# Patient Record
Sex: FEMALE | Race: WHITE | NOT HISPANIC OR LATINO | Employment: OTHER
[De-identification: names, ages, dates, MRNs, and addresses within clinical notes are randomized per-mention and may not be internally consistent; named-entity substitution may affect disease eponyms.]

---

## 2017-02-01 ENCOUNTER — RECORDS - HEALTHEAST (OUTPATIENT)
Dept: ADMINISTRATIVE | Facility: OTHER | Age: 82
End: 2017-02-01

## 2017-03-09 ENCOUNTER — COMMUNICATION - HEALTHEAST (OUTPATIENT)
Dept: INTERNAL MEDICINE | Facility: CLINIC | Age: 82
End: 2017-03-09

## 2017-04-14 ENCOUNTER — COMMUNICATION - HEALTHEAST (OUTPATIENT)
Dept: INTERNAL MEDICINE | Facility: CLINIC | Age: 82
End: 2017-04-14

## 2017-06-10 ENCOUNTER — COMMUNICATION - HEALTHEAST (OUTPATIENT)
Dept: INTERNAL MEDICINE | Facility: CLINIC | Age: 82
End: 2017-06-10

## 2017-06-16 ENCOUNTER — COMMUNICATION - HEALTHEAST (OUTPATIENT)
Dept: INTERNAL MEDICINE | Facility: CLINIC | Age: 82
End: 2017-06-16

## 2017-06-20 ENCOUNTER — COMMUNICATION - HEALTHEAST (OUTPATIENT)
Dept: INTERNAL MEDICINE | Facility: CLINIC | Age: 82
End: 2017-06-20

## 2017-07-21 ENCOUNTER — RECORDS - HEALTHEAST (OUTPATIENT)
Dept: ADMINISTRATIVE | Facility: OTHER | Age: 82
End: 2017-07-21

## 2017-08-21 ENCOUNTER — OFFICE VISIT - HEALTHEAST (OUTPATIENT)
Dept: INTERNAL MEDICINE | Facility: CLINIC | Age: 82
End: 2017-08-21

## 2017-08-21 DIAGNOSIS — R00.2 PALPITATIONS: ICD-10-CM

## 2017-08-21 DIAGNOSIS — I10 ESSENTIAL HYPERTENSION WITH GOAL BLOOD PRESSURE LESS THAN 140/90: ICD-10-CM

## 2017-08-21 DIAGNOSIS — K57.90 DIVERTICULOSIS: ICD-10-CM

## 2017-08-21 DIAGNOSIS — Z00.00 ROUTINE GENERAL MEDICAL EXAMINATION AT A HEALTH CARE FACILITY: ICD-10-CM

## 2017-08-21 DIAGNOSIS — R07.81 PLEURODYNIA: ICD-10-CM

## 2017-08-21 DIAGNOSIS — D47.2 MONOCLONAL PARAPROTEINEMIA: ICD-10-CM

## 2017-08-21 DIAGNOSIS — I47.10 SVT (SUPRAVENTRICULAR TACHYCARDIA) (H): ICD-10-CM

## 2017-08-21 DIAGNOSIS — D36.9 ADENOMATOUS POLYP: ICD-10-CM

## 2017-08-21 DIAGNOSIS — E78.00 HYPERCHOLESTEREMIA: ICD-10-CM

## 2017-08-21 LAB
CHOLEST SERPL-MCNC: 173 MG/DL
FASTING STATUS PATIENT QL REPORTED: YES
HDLC SERPL-MCNC: 58 MG/DL
LDLC SERPL CALC-MCNC: 98 MG/DL
TRIGL SERPL-MCNC: 83 MG/DL

## 2017-08-21 ASSESSMENT — MIFFLIN-ST. JEOR: SCORE: 1092.25

## 2017-08-22 ENCOUNTER — COMMUNICATION - HEALTHEAST (OUTPATIENT)
Dept: INTERNAL MEDICINE | Facility: CLINIC | Age: 82
End: 2017-08-22

## 2017-10-17 ENCOUNTER — AMBULATORY - HEALTHEAST (OUTPATIENT)
Dept: INTERNAL MEDICINE | Facility: CLINIC | Age: 82
End: 2017-10-17

## 2017-10-17 ENCOUNTER — AMBULATORY - HEALTHEAST (OUTPATIENT)
Dept: NURSING | Facility: CLINIC | Age: 82
End: 2017-10-17

## 2017-10-20 ENCOUNTER — COMMUNICATION - HEALTHEAST (OUTPATIENT)
Dept: INTERNAL MEDICINE | Facility: CLINIC | Age: 82
End: 2017-10-20

## 2017-10-20 DIAGNOSIS — I10 ESSENTIAL HYPERTENSION WITH GOAL BLOOD PRESSURE LESS THAN 140/90: ICD-10-CM

## 2018-03-24 ENCOUNTER — COMMUNICATION - HEALTHEAST (OUTPATIENT)
Dept: INTERNAL MEDICINE | Facility: CLINIC | Age: 83
End: 2018-03-24

## 2018-03-24 DIAGNOSIS — I10 HYPERTENSION: ICD-10-CM

## 2018-08-24 ENCOUNTER — AMBULATORY - HEALTHEAST (OUTPATIENT)
Dept: INTERNAL MEDICINE | Facility: CLINIC | Age: 83
End: 2018-08-24

## 2018-08-27 ENCOUNTER — OFFICE VISIT - HEALTHEAST (OUTPATIENT)
Dept: INTERNAL MEDICINE | Facility: CLINIC | Age: 83
End: 2018-08-27

## 2018-08-27 DIAGNOSIS — E78.00 HYPERCHOLESTEREMIA: ICD-10-CM

## 2018-08-27 DIAGNOSIS — D36.9 ADENOMATOUS POLYP: ICD-10-CM

## 2018-08-27 DIAGNOSIS — I47.10 SVT (SUPRAVENTRICULAR TACHYCARDIA) (H): ICD-10-CM

## 2018-08-27 DIAGNOSIS — Z00.00 ROUTINE GENERAL MEDICAL EXAMINATION AT A HEALTH CARE FACILITY: ICD-10-CM

## 2018-08-27 DIAGNOSIS — I10 ESSENTIAL HYPERTENSION: ICD-10-CM

## 2018-08-27 LAB
ALBUMIN SERPL-MCNC: 4.2 G/DL (ref 3.5–5)
ALP SERPL-CCNC: 69 U/L (ref 45–120)
ALT SERPL W P-5'-P-CCNC: 15 U/L (ref 0–45)
ANION GAP SERPL CALCULATED.3IONS-SCNC: 10 MMOL/L (ref 5–18)
AST SERPL W P-5'-P-CCNC: 26 U/L (ref 0–40)
BILIRUB SERPL-MCNC: 0.7 MG/DL (ref 0–1)
BUN SERPL-MCNC: 30 MG/DL (ref 8–28)
CALCIUM SERPL-MCNC: 10.4 MG/DL (ref 8.5–10.5)
CHLORIDE BLD-SCNC: 105 MMOL/L (ref 98–107)
CHOLEST SERPL-MCNC: 185 MG/DL
CO2 SERPL-SCNC: 26 MMOL/L (ref 22–31)
CREAT SERPL-MCNC: 1.26 MG/DL (ref 0.6–1.1)
ERYTHROCYTE [DISTWIDTH] IN BLOOD BY AUTOMATED COUNT: 12.6 % (ref 11–14.5)
FASTING STATUS PATIENT QL REPORTED: YES
GFR SERPL CREATININE-BSD FRML MDRD: 41 ML/MIN/1.73M2
GLUCOSE BLD-MCNC: 98 MG/DL (ref 70–125)
HCT VFR BLD AUTO: 43.8 % (ref 35–47)
HDLC SERPL-MCNC: 61 MG/DL
HGB BLD-MCNC: 14.8 G/DL (ref 12–16)
LDLC SERPL CALC-MCNC: 106 MG/DL
MCH RBC QN AUTO: 32.6 PG (ref 27–34)
MCHC RBC AUTO-ENTMCNC: 33.8 G/DL (ref 32–36)
MCV RBC AUTO: 96 FL (ref 80–100)
PLATELET # BLD AUTO: 197 THOU/UL (ref 140–440)
PMV BLD AUTO: 8.3 FL (ref 7–10)
POTASSIUM BLD-SCNC: 4.3 MMOL/L (ref 3.5–5)
PROT SERPL-MCNC: 8.3 G/DL (ref 6–8)
RBC # BLD AUTO: 4.54 MILL/UL (ref 3.8–5.4)
SODIUM SERPL-SCNC: 141 MMOL/L (ref 136–145)
TRIGL SERPL-MCNC: 92 MG/DL
WBC: 5.9 THOU/UL (ref 4–11)

## 2018-08-27 ASSESSMENT — MIFFLIN-ST. JEOR: SCORE: 1055.96

## 2018-08-28 ENCOUNTER — COMMUNICATION - HEALTHEAST (OUTPATIENT)
Dept: INTERNAL MEDICINE | Facility: CLINIC | Age: 83
End: 2018-08-28

## 2018-09-19 ENCOUNTER — RECORDS - HEALTHEAST (OUTPATIENT)
Dept: ADMINISTRATIVE | Facility: OTHER | Age: 83
End: 2018-09-19

## 2018-09-21 ENCOUNTER — COMMUNICATION - HEALTHEAST (OUTPATIENT)
Dept: INTERNAL MEDICINE | Facility: CLINIC | Age: 83
End: 2018-09-21

## 2018-09-21 DIAGNOSIS — I10 HYPERTENSION: ICD-10-CM

## 2018-09-28 ENCOUNTER — COMMUNICATION - HEALTHEAST (OUTPATIENT)
Dept: INTERNAL MEDICINE | Facility: CLINIC | Age: 83
End: 2018-09-28

## 2018-09-28 DIAGNOSIS — E78.5 HYPERLIPIDEMIA: ICD-10-CM

## 2018-10-02 ENCOUNTER — AMBULATORY - HEALTHEAST (OUTPATIENT)
Dept: NURSING | Facility: CLINIC | Age: 83
End: 2018-10-02

## 2018-10-02 DIAGNOSIS — Z23 FLU VACCINE NEED: ICD-10-CM

## 2018-10-16 ENCOUNTER — COMMUNICATION - HEALTHEAST (OUTPATIENT)
Dept: INTERNAL MEDICINE | Facility: CLINIC | Age: 83
End: 2018-10-16

## 2018-10-16 DIAGNOSIS — I10 ESSENTIAL HYPERTENSION WITH GOAL BLOOD PRESSURE LESS THAN 140/90: ICD-10-CM

## 2018-11-29 ENCOUNTER — AMBULATORY - HEALTHEAST (OUTPATIENT)
Dept: INTERNAL MEDICINE | Facility: CLINIC | Age: 83
End: 2018-11-29

## 2018-11-29 DIAGNOSIS — I10 ESSENTIAL HYPERTENSION: ICD-10-CM

## 2018-11-30 ENCOUNTER — AMBULATORY - HEALTHEAST (OUTPATIENT)
Dept: LAB | Facility: CLINIC | Age: 83
End: 2018-11-30

## 2018-11-30 DIAGNOSIS — I10 ESSENTIAL HYPERTENSION: ICD-10-CM

## 2018-11-30 LAB
ANION GAP SERPL CALCULATED.3IONS-SCNC: 12 MMOL/L (ref 5–18)
BUN SERPL-MCNC: 27 MG/DL (ref 8–28)
CALCIUM SERPL-MCNC: 10.2 MG/DL (ref 8.5–10.5)
CHLORIDE BLD-SCNC: 104 MMOL/L (ref 98–107)
CO2 SERPL-SCNC: 25 MMOL/L (ref 22–31)
CREAT SERPL-MCNC: 1.18 MG/DL (ref 0.6–1.1)
GFR SERPL CREATININE-BSD FRML MDRD: 44 ML/MIN/1.73M2
GLUCOSE BLD-MCNC: 113 MG/DL (ref 70–125)
POTASSIUM BLD-SCNC: 4 MMOL/L (ref 3.5–5)
SODIUM SERPL-SCNC: 141 MMOL/L (ref 136–145)

## 2018-12-03 ENCOUNTER — COMMUNICATION - HEALTHEAST (OUTPATIENT)
Dept: INTERNAL MEDICINE | Facility: CLINIC | Age: 83
End: 2018-12-03

## 2019-03-12 ENCOUNTER — OFFICE VISIT - HEALTHEAST (OUTPATIENT)
Dept: INTERNAL MEDICINE | Facility: CLINIC | Age: 84
End: 2019-03-12

## 2019-03-12 DIAGNOSIS — M54.31 SCIATICA OF RIGHT SIDE: ICD-10-CM

## 2019-03-12 ASSESSMENT — MIFFLIN-ST. JEOR: SCORE: 1055.96

## 2019-04-11 ENCOUNTER — HOSPITAL ENCOUNTER (OUTPATIENT)
Dept: LAB | Age: 84
Setting detail: SPECIMEN
Discharge: HOME | End: 2019-04-11

## 2019-04-11 ENCOUNTER — COMMUNICATION - HEALTHEAST (OUTPATIENT)
Dept: INTERNAL MEDICINE | Facility: CLINIC | Age: 84
End: 2019-04-11

## 2019-04-11 ENCOUNTER — OFFICE VISIT - HEALTHEAST (OUTPATIENT)
Dept: INTERNAL MEDICINE | Facility: CLINIC | Age: 84
End: 2019-04-11

## 2019-04-11 DIAGNOSIS — I10 ESSENTIAL HYPERTENSION: ICD-10-CM

## 2019-04-11 DIAGNOSIS — Z01.818 PREOP GENERAL PHYSICAL EXAM: ICD-10-CM

## 2019-04-11 DIAGNOSIS — H25.013 CORTICAL AGE-RELATED CATARACT OF BOTH EYES: ICD-10-CM

## 2019-04-11 DIAGNOSIS — E78.00 HYPERCHOLESTEREMIA: ICD-10-CM

## 2019-04-11 LAB
ANION GAP SERPL CALCULATED.3IONS-SCNC: 10 MMOL/L (ref 5–18)
BUN SERPL-MCNC: 28 MG/DL (ref 8–28)
CALCIUM SERPL-MCNC: 10.1 MG/DL (ref 8.5–10.5)
CHLORIDE BLD-SCNC: 102 MMOL/L (ref 98–107)
CO2 SERPL-SCNC: 27 MMOL/L (ref 22–31)
CREAT SERPL-MCNC: 1.26 MG/DL (ref 0.6–1.1)
ERYTHROCYTE [DISTWIDTH] IN BLOOD BY AUTOMATED COUNT: 13.4 % (ref 11–14.5)
GFR SERPL CREATININE-BSD FRML MDRD: 41 ML/MIN/1.73M2
GLUCOSE BLD-MCNC: 101 MG/DL (ref 70–125)
HCT VFR BLD AUTO: 40.7 % (ref 35–47)
HGB BLD-MCNC: 14.3 G/DL (ref 12–16)
MCH RBC QN AUTO: 32.4 PG (ref 27–34)
MCHC RBC AUTO-ENTMCNC: 35.1 G/DL (ref 32–36)
MCV RBC AUTO: 92 FL (ref 80–100)
PLATELET # BLD AUTO: 246 THOU/UL (ref 140–440)
PMV BLD AUTO: 8.8 FL (ref 7–10)
POTASSIUM BLD-SCNC: 4.2 MMOL/L (ref 3.5–5)
RBC # BLD AUTO: 4.4 MILL/UL (ref 3.8–5.4)
SODIUM SERPL-SCNC: 139 MMOL/L (ref 136–145)
WBC: 5.5 THOU/UL (ref 4–11)

## 2019-04-11 ASSESSMENT — MIFFLIN-ST. JEOR: SCORE: 1037.82

## 2019-05-13 ENCOUNTER — RECORDS - HEALTHEAST (OUTPATIENT)
Dept: ADMINISTRATIVE | Facility: OTHER | Age: 84
End: 2019-05-13

## 2019-06-06 ENCOUNTER — OFFICE VISIT - HEALTHEAST (OUTPATIENT)
Dept: INTERNAL MEDICINE | Facility: CLINIC | Age: 84
End: 2019-06-06

## 2019-06-06 ENCOUNTER — COMMUNICATION - HEALTHEAST (OUTPATIENT)
Dept: INTERNAL MEDICINE | Facility: CLINIC | Age: 84
End: 2019-06-06

## 2019-06-06 DIAGNOSIS — R05.9 COUGH: ICD-10-CM

## 2019-08-29 ENCOUNTER — OFFICE VISIT - HEALTHEAST (OUTPATIENT)
Dept: INTERNAL MEDICINE | Facility: CLINIC | Age: 84
End: 2019-08-29

## 2019-08-29 ENCOUNTER — COMMUNICATION - HEALTHEAST (OUTPATIENT)
Dept: INTERNAL MEDICINE | Facility: CLINIC | Age: 84
End: 2019-08-29

## 2019-08-29 DIAGNOSIS — I10 ESSENTIAL HYPERTENSION: ICD-10-CM

## 2019-08-29 DIAGNOSIS — Z00.00 ROUTINE GENERAL MEDICAL EXAMINATION AT A HEALTH CARE FACILITY: ICD-10-CM

## 2019-08-29 DIAGNOSIS — D36.9 ADENOMATOUS POLYP: ICD-10-CM

## 2019-08-29 DIAGNOSIS — E78.00 HYPERCHOLESTEREMIA: ICD-10-CM

## 2019-08-29 LAB
ALBUMIN SERPL-MCNC: 3.9 G/DL (ref 3.5–5)
ALP SERPL-CCNC: 68 U/L (ref 45–120)
ALT SERPL W P-5'-P-CCNC: 18 U/L (ref 0–45)
ANION GAP SERPL CALCULATED.3IONS-SCNC: 9 MMOL/L (ref 5–18)
AST SERPL W P-5'-P-CCNC: 25 U/L (ref 0–40)
BILIRUB SERPL-MCNC: 0.7 MG/DL (ref 0–1)
BUN SERPL-MCNC: 29 MG/DL (ref 8–28)
CALCIUM SERPL-MCNC: 9.9 MG/DL (ref 8.5–10.5)
CHLORIDE BLD-SCNC: 104 MMOL/L (ref 98–107)
CHOLEST SERPL-MCNC: 163 MG/DL
CO2 SERPL-SCNC: 28 MMOL/L (ref 22–31)
CREAT SERPL-MCNC: 1.23 MG/DL (ref 0.6–1.1)
ERYTHROCYTE [DISTWIDTH] IN BLOOD BY AUTOMATED COUNT: 12.9 % (ref 11–14.5)
FASTING STATUS PATIENT QL REPORTED: YES
GFR SERPL CREATININE-BSD FRML MDRD: 42 ML/MIN/1.73M2
GLUCOSE BLD-MCNC: 101 MG/DL (ref 70–125)
HCT VFR BLD AUTO: 42.3 % (ref 35–47)
HDLC SERPL-MCNC: 62 MG/DL
HGB BLD-MCNC: 14.4 G/DL (ref 12–16)
LDLC SERPL CALC-MCNC: 83 MG/DL
MCH RBC QN AUTO: 32.6 PG (ref 27–34)
MCHC RBC AUTO-ENTMCNC: 34.1 G/DL (ref 32–36)
MCV RBC AUTO: 96 FL (ref 80–100)
PLATELET # BLD AUTO: 164 THOU/UL (ref 140–440)
PMV BLD AUTO: 8.2 FL (ref 7–10)
POTASSIUM BLD-SCNC: 4.4 MMOL/L (ref 3.5–5)
PROT SERPL-MCNC: 7.6 G/DL (ref 6–8)
RBC # BLD AUTO: 4.43 MILL/UL (ref 3.8–5.4)
SODIUM SERPL-SCNC: 141 MMOL/L (ref 136–145)
TRIGL SERPL-MCNC: 89 MG/DL
WBC: 4.1 THOU/UL (ref 4–11)

## 2019-08-29 RX ORDER — MOMETASONE FUROATE 1 MG/G
OINTMENT TOPICAL
Status: SHIPPED | COMMUNITY
Start: 2019-07-16 | End: 2023-01-01

## 2019-08-29 ASSESSMENT — MIFFLIN-ST. JEOR: SCORE: 1046.89

## 2019-09-06 ENCOUNTER — RECORDS - HEALTHEAST (OUTPATIENT)
Dept: ADMINISTRATIVE | Facility: OTHER | Age: 84
End: 2019-09-06

## 2019-09-28 ENCOUNTER — COMMUNICATION - HEALTHEAST (OUTPATIENT)
Dept: INTERNAL MEDICINE | Facility: CLINIC | Age: 84
End: 2019-09-28

## 2019-09-28 DIAGNOSIS — E78.5 HYPERLIPIDEMIA: ICD-10-CM

## 2019-10-15 ENCOUNTER — COMMUNICATION - HEALTHEAST (OUTPATIENT)
Dept: INTERNAL MEDICINE | Facility: CLINIC | Age: 84
End: 2019-10-15

## 2019-10-23 ENCOUNTER — OFFICE VISIT - HEALTHEAST (OUTPATIENT)
Dept: INTERNAL MEDICINE | Facility: CLINIC | Age: 84
End: 2019-10-23

## 2019-10-23 DIAGNOSIS — R05.9 COUGH: ICD-10-CM

## 2019-10-23 DIAGNOSIS — E78.00 HYPERCHOLESTEREMIA: ICD-10-CM

## 2019-10-23 DIAGNOSIS — D47.2 MONOCLONAL PARAPROTEINEMIA: ICD-10-CM

## 2019-10-23 DIAGNOSIS — I10 ESSENTIAL HYPERTENSION: ICD-10-CM

## 2019-10-23 DIAGNOSIS — Z23 NEED FOR IMMUNIZATION AGAINST INFLUENZA: ICD-10-CM

## 2019-10-23 DIAGNOSIS — N28.9 RENAL INSUFFICIENCY: ICD-10-CM

## 2019-10-23 DIAGNOSIS — R53.82 CHRONIC FATIGUE: ICD-10-CM

## 2019-10-23 LAB
ANION GAP SERPL CALCULATED.3IONS-SCNC: 11 MMOL/L (ref 5–18)
BUN SERPL-MCNC: 21 MG/DL (ref 8–28)
CALCIUM SERPL-MCNC: 9.8 MG/DL (ref 8.5–10.5)
CHLORIDE BLD-SCNC: 105 MMOL/L (ref 98–107)
CO2 SERPL-SCNC: 28 MMOL/L (ref 22–31)
CREAT SERPL-MCNC: 0.93 MG/DL (ref 0.6–1.1)
GFR SERPL CREATININE-BSD FRML MDRD: 58 ML/MIN/1.73M2
GLUCOSE BLD-MCNC: 85 MG/DL (ref 70–125)
POTASSIUM BLD-SCNC: 3.9 MMOL/L (ref 3.5–5)
SODIUM SERPL-SCNC: 144 MMOL/L (ref 136–145)
TSH SERPL DL<=0.005 MIU/L-ACNC: 1.15 UIU/ML (ref 0.3–5)

## 2019-10-23 ASSESSMENT — MIFFLIN-ST. JEOR: SCORE: 1060.5

## 2019-10-24 ENCOUNTER — COMMUNICATION - HEALTHEAST (OUTPATIENT)
Dept: INTERNAL MEDICINE | Facility: CLINIC | Age: 84
End: 2019-10-24

## 2019-10-24 DIAGNOSIS — I10 ESSENTIAL HYPERTENSION WITH GOAL BLOOD PRESSURE LESS THAN 140/90: ICD-10-CM

## 2019-10-25 ENCOUNTER — COMMUNICATION - HEALTHEAST (OUTPATIENT)
Dept: INTERNAL MEDICINE | Facility: CLINIC | Age: 84
End: 2019-10-25

## 2019-10-25 LAB
ALBUMIN PERCENT: 59.7 % (ref 51–67)
ALBUMIN SERPL ELPH-MCNC: 4.5 G/DL (ref 3.2–4.7)
ALPHA 1 PERCENT: 2.4 % (ref 2–4)
ALPHA 2 PERCENT: 9.4 % (ref 5–13)
ALPHA1 GLOB SERPL ELPH-MCNC: 0.2 G/DL (ref 0.1–0.3)
ALPHA2 GLOB SERPL ELPH-MCNC: 0.7 G/DL (ref 0.4–0.9)
B-GLOBULIN SERPL ELPH-MCNC: 0.8 G/DL (ref 0.7–1.2)
BETA PERCENT: 11.2 % (ref 10–17)
GAMMA GLOB SERPL ELPH-MCNC: 1.3 G/DL (ref 0.6–1.4)
GAMMA GLOBULIN PERCENT: 17.3 % (ref 9–20)
PATH ICD:: NORMAL
PROT PATTERN SERPL ELPH-IMP: NORMAL
PROT SERPL-MCNC: 7.5 G/DL (ref 6–8)
REVIEWING PATHOLOGIST: NORMAL

## 2019-11-04 ENCOUNTER — COMMUNICATION - HEALTHEAST (OUTPATIENT)
Dept: INTERNAL MEDICINE | Facility: CLINIC | Age: 84
End: 2019-11-04

## 2019-11-04 DIAGNOSIS — I10 ESSENTIAL HYPERTENSION: ICD-10-CM

## 2019-11-22 ENCOUNTER — OFFICE VISIT - HEALTHEAST (OUTPATIENT)
Dept: INTERNAL MEDICINE | Facility: CLINIC | Age: 84
End: 2019-11-22

## 2019-11-22 DIAGNOSIS — I10 ESSENTIAL HYPERTENSION: ICD-10-CM

## 2019-11-22 DIAGNOSIS — N28.9 RENAL INSUFFICIENCY: ICD-10-CM

## 2019-11-22 DIAGNOSIS — E78.00 HYPERCHOLESTEREMIA: ICD-10-CM

## 2019-11-22 DIAGNOSIS — I47.10 SVT (SUPRAVENTRICULAR TACHYCARDIA) (H): ICD-10-CM

## 2020-01-22 ENCOUNTER — COMMUNICATION - HEALTHEAST (OUTPATIENT)
Dept: SCHEDULING | Facility: CLINIC | Age: 85
End: 2020-01-22

## 2020-01-22 DIAGNOSIS — I10 ESSENTIAL HYPERTENSION: ICD-10-CM

## 2020-01-29 ENCOUNTER — RECORDS - HEALTHEAST (OUTPATIENT)
Dept: GENERAL RADIOLOGY | Facility: CLINIC | Age: 85
End: 2020-01-29

## 2020-01-29 ENCOUNTER — OFFICE VISIT - HEALTHEAST (OUTPATIENT)
Dept: INTERNAL MEDICINE | Facility: CLINIC | Age: 85
End: 2020-01-29

## 2020-01-29 DIAGNOSIS — I10 ESSENTIAL HYPERTENSION: ICD-10-CM

## 2020-01-29 DIAGNOSIS — E78.00 HYPERCHOLESTEREMIA: ICD-10-CM

## 2020-01-29 DIAGNOSIS — M48.062 SPINAL STENOSIS, LUMBAR REGION WITH NEUROGENIC CLAUDICATION: ICD-10-CM

## 2020-01-29 DIAGNOSIS — N28.9 RENAL INSUFFICIENCY: ICD-10-CM

## 2020-01-29 DIAGNOSIS — M48.062 SPINAL STENOSIS OF LUMBAR REGION WITH NEUROGENIC CLAUDICATION: ICD-10-CM

## 2020-01-29 ASSESSMENT — MIFFLIN-ST. JEOR: SCORE: 1046.89

## 2020-01-30 ENCOUNTER — COMMUNICATION - HEALTHEAST (OUTPATIENT)
Dept: INTERNAL MEDICINE | Facility: CLINIC | Age: 85
End: 2020-01-30

## 2020-01-31 ENCOUNTER — HOSPITAL ENCOUNTER (OUTPATIENT)
Dept: PHYSICAL MEDICINE AND REHAB | Facility: CLINIC | Age: 85
Discharge: HOME | End: 2020-01-31
Attending: INTERNAL MEDICINE

## 2020-01-31 DIAGNOSIS — M41.9 SCOLIOSIS OF LUMBAR SPINE, UNSPECIFIED SCOLIOSIS TYPE: ICD-10-CM

## 2020-01-31 DIAGNOSIS — M54.16 LUMBAR RADICULAR PAIN: ICD-10-CM

## 2020-01-31 DIAGNOSIS — M47.816 LUMBAR FACET ARTHROPATHY: ICD-10-CM

## 2020-01-31 ASSESSMENT — MIFFLIN-ST. JEOR: SCORE: 1044.51

## 2020-02-10 ENCOUNTER — OFFICE VISIT - HEALTHEAST (OUTPATIENT)
Dept: PHYSICAL THERAPY | Facility: REHABILITATION | Age: 85
End: 2020-02-10

## 2020-02-10 DIAGNOSIS — M54.16 LUMBAR RADICULAR PAIN: ICD-10-CM

## 2020-02-10 DIAGNOSIS — M62.81 GENERALIZED MUSCLE WEAKNESS: ICD-10-CM

## 2020-02-10 DIAGNOSIS — M47.816 LUMBAR FACET ARTHROPATHY: ICD-10-CM

## 2020-02-11 ENCOUNTER — COMMUNICATION - HEALTHEAST (OUTPATIENT)
Dept: PHYSICAL MEDICINE AND REHAB | Facility: CLINIC | Age: 85
End: 2020-02-11

## 2020-02-11 ENCOUNTER — HOSPITAL ENCOUNTER (OUTPATIENT)
Dept: MRI IMAGING | Facility: CLINIC | Age: 85
Discharge: HOME | End: 2020-02-11
Attending: PHYSICIAN ASSISTANT

## 2020-02-11 DIAGNOSIS — M54.16 LUMBAR RADICULAR PAIN: ICD-10-CM

## 2020-02-11 DIAGNOSIS — M47.816 LUMBAR FACET ARTHROPATHY: ICD-10-CM

## 2020-02-11 DIAGNOSIS — M48.062 SPINAL STENOSIS OF LUMBAR REGION WITH NEUROGENIC CLAUDICATION: ICD-10-CM

## 2020-02-11 DIAGNOSIS — M41.9 SCOLIOSIS OF LUMBAR SPINE, UNSPECIFIED SCOLIOSIS TYPE: ICD-10-CM

## 2020-02-24 ENCOUNTER — OFFICE VISIT - HEALTHEAST (OUTPATIENT)
Dept: PHYSICAL THERAPY | Facility: REHABILITATION | Age: 85
End: 2020-02-24

## 2020-02-24 ENCOUNTER — RECORDS - HEALTHEAST (OUTPATIENT)
Dept: ADMINISTRATIVE | Facility: OTHER | Age: 85
End: 2020-02-24

## 2020-02-24 DIAGNOSIS — M54.16 LUMBAR RADICULAR PAIN: ICD-10-CM

## 2020-02-24 DIAGNOSIS — M62.81 GENERALIZED MUSCLE WEAKNESS: ICD-10-CM

## 2020-02-24 DIAGNOSIS — M47.816 LUMBAR FACET ARTHROPATHY: ICD-10-CM

## 2020-02-25 ENCOUNTER — HOSPITAL ENCOUNTER (OUTPATIENT)
Dept: RADIOLOGY | Facility: CLINIC | Age: 85
Discharge: HOME | End: 2020-02-25
Attending: PHYSICIAN ASSISTANT

## 2020-02-25 DIAGNOSIS — M47.816 LUMBAR FACET ARTHROPATHY: ICD-10-CM

## 2020-02-26 ENCOUNTER — COMMUNICATION - HEALTHEAST (OUTPATIENT)
Dept: PHYSICAL MEDICINE AND REHAB | Facility: CLINIC | Age: 85
End: 2020-02-26

## 2020-03-02 ENCOUNTER — OFFICE VISIT - HEALTHEAST (OUTPATIENT)
Dept: PHYSICAL THERAPY | Facility: REHABILITATION | Age: 85
End: 2020-03-02

## 2020-03-02 DIAGNOSIS — M54.16 LUMBAR RADICULAR PAIN: ICD-10-CM

## 2020-03-02 DIAGNOSIS — M47.816 LUMBAR FACET ARTHROPATHY: ICD-10-CM

## 2020-03-02 DIAGNOSIS — M62.81 GENERALIZED MUSCLE WEAKNESS: ICD-10-CM

## 2020-03-05 ENCOUNTER — OFFICE VISIT - HEALTHEAST (OUTPATIENT)
Dept: NEUROSURGERY | Facility: CLINIC | Age: 85
End: 2020-03-05

## 2020-03-05 DIAGNOSIS — M54.16 LUMBAR RADICULOPATHY: ICD-10-CM

## 2020-03-05 ASSESSMENT — MIFFLIN-ST. JEOR: SCORE: 1044.05

## 2020-03-06 ENCOUNTER — OFFICE VISIT - HEALTHEAST (OUTPATIENT)
Dept: PHYSICAL THERAPY | Facility: REHABILITATION | Age: 85
End: 2020-03-06

## 2020-03-06 DIAGNOSIS — M62.81 GENERALIZED MUSCLE WEAKNESS: ICD-10-CM

## 2020-03-06 DIAGNOSIS — M54.16 LUMBAR RADICULAR PAIN: ICD-10-CM

## 2020-03-06 DIAGNOSIS — M47.816 LUMBAR FACET ARTHROPATHY: ICD-10-CM

## 2020-03-12 ENCOUNTER — OFFICE VISIT - HEALTHEAST (OUTPATIENT)
Dept: PHYSICAL THERAPY | Facility: REHABILITATION | Age: 85
End: 2020-03-12

## 2020-03-12 DIAGNOSIS — M47.816 LUMBAR FACET ARTHROPATHY: ICD-10-CM

## 2020-03-12 DIAGNOSIS — M62.81 GENERALIZED MUSCLE WEAKNESS: ICD-10-CM

## 2020-03-12 DIAGNOSIS — M54.16 LUMBAR RADICULAR PAIN: ICD-10-CM

## 2020-03-16 ENCOUNTER — OFFICE VISIT - HEALTHEAST (OUTPATIENT)
Dept: PHYSICAL THERAPY | Facility: REHABILITATION | Age: 85
End: 2020-03-16

## 2020-03-16 DIAGNOSIS — M62.81 GENERALIZED MUSCLE WEAKNESS: ICD-10-CM

## 2020-03-16 DIAGNOSIS — M54.16 LUMBAR RADICULAR PAIN: ICD-10-CM

## 2020-03-16 DIAGNOSIS — M47.816 LUMBAR FACET ARTHROPATHY: ICD-10-CM

## 2020-03-17 ENCOUNTER — HOSPITAL ENCOUNTER (OUTPATIENT)
Dept: MRI IMAGING | Facility: CLINIC | Age: 85
Discharge: HOME | End: 2020-03-17
Attending: SURGERY

## 2020-03-17 DIAGNOSIS — M54.16 LUMBAR RADICULOPATHY: ICD-10-CM

## 2020-03-18 ENCOUNTER — COMMUNICATION - HEALTHEAST (OUTPATIENT)
Dept: PHYSICAL THERAPY | Facility: REHABILITATION | Age: 85
End: 2020-03-18

## 2020-03-19 ENCOUNTER — OFFICE VISIT - HEALTHEAST (OUTPATIENT)
Dept: NEUROSURGERY | Facility: CLINIC | Age: 85
End: 2020-03-19

## 2020-03-19 DIAGNOSIS — M48.02 CERVICAL STENOSIS OF SPINAL CANAL: ICD-10-CM

## 2020-03-19 DIAGNOSIS — M48.062 SPINAL STENOSIS OF LUMBAR REGION WITH NEUROGENIC CLAUDICATION: ICD-10-CM

## 2020-04-29 ENCOUNTER — OFFICE VISIT - HEALTHEAST (OUTPATIENT)
Dept: INTERNAL MEDICINE | Facility: CLINIC | Age: 85
End: 2020-04-29

## 2020-04-29 DIAGNOSIS — E78.00 HYPERCHOLESTEREMIA: ICD-10-CM

## 2020-04-29 DIAGNOSIS — I10 ESSENTIAL HYPERTENSION: ICD-10-CM

## 2020-04-29 DIAGNOSIS — M48.062 SPINAL STENOSIS OF LUMBAR REGION WITH NEUROGENIC CLAUDICATION: ICD-10-CM

## 2020-04-29 DIAGNOSIS — M48.02 CERVICAL STENOSIS OF SPINAL CANAL: ICD-10-CM

## 2020-04-29 DIAGNOSIS — I47.10 SVT (SUPRAVENTRICULAR TACHYCARDIA) (H): ICD-10-CM

## 2020-06-03 ENCOUNTER — OFFICE VISIT - HEALTHEAST (OUTPATIENT)
Dept: PHYSICAL THERAPY | Facility: REHABILITATION | Age: 85
End: 2020-06-03

## 2020-06-03 DIAGNOSIS — M47.816 LUMBAR FACET ARTHROPATHY: ICD-10-CM

## 2020-06-03 DIAGNOSIS — M62.81 GENERALIZED MUSCLE WEAKNESS: ICD-10-CM

## 2020-06-03 DIAGNOSIS — M54.16 LUMBAR RADICULAR PAIN: ICD-10-CM

## 2020-07-28 ENCOUNTER — RECORDS - HEALTHEAST (OUTPATIENT)
Dept: ADMINISTRATIVE | Facility: OTHER | Age: 85
End: 2020-07-28

## 2020-08-14 ENCOUNTER — COMMUNICATION - HEALTHEAST (OUTPATIENT)
Dept: INTERNAL MEDICINE | Facility: CLINIC | Age: 85
End: 2020-08-14

## 2020-08-14 DIAGNOSIS — I10 ESSENTIAL HYPERTENSION: ICD-10-CM

## 2020-09-01 ENCOUNTER — OFFICE VISIT - HEALTHEAST (OUTPATIENT)
Dept: INTERNAL MEDICINE | Facility: CLINIC | Age: 85
End: 2020-09-01

## 2020-09-01 DIAGNOSIS — M48.062 SPINAL STENOSIS OF LUMBAR REGION WITH NEUROGENIC CLAUDICATION: ICD-10-CM

## 2020-09-01 DIAGNOSIS — E78.5 HYPERLIPIDEMIA: ICD-10-CM

## 2020-09-01 DIAGNOSIS — I10 ESSENTIAL HYPERTENSION WITH GOAL BLOOD PRESSURE LESS THAN 140/90: ICD-10-CM

## 2020-09-01 DIAGNOSIS — I47.10 SVT (SUPRAVENTRICULAR TACHYCARDIA) (H): ICD-10-CM

## 2020-09-01 DIAGNOSIS — N28.9 RENAL INSUFFICIENCY: ICD-10-CM

## 2020-09-01 DIAGNOSIS — Z00.00 ROUTINE GENERAL MEDICAL EXAMINATION AT A HEALTH CARE FACILITY: ICD-10-CM

## 2020-09-01 DIAGNOSIS — I10 ESSENTIAL HYPERTENSION: ICD-10-CM

## 2020-09-01 DIAGNOSIS — E78.00 HYPERCHOLESTEREMIA: ICD-10-CM

## 2020-09-01 DIAGNOSIS — M48.02 CERVICAL STENOSIS OF SPINAL CANAL: ICD-10-CM

## 2020-09-01 LAB
ALBUMIN SERPL-MCNC: 4.1 G/DL (ref 3.5–5)
ALP SERPL-CCNC: 70 U/L (ref 45–120)
ALT SERPL W P-5'-P-CCNC: 14 U/L (ref 0–45)
ANION GAP SERPL CALCULATED.3IONS-SCNC: 10 MMOL/L (ref 5–18)
AST SERPL W P-5'-P-CCNC: 23 U/L (ref 0–40)
BILIRUB SERPL-MCNC: 0.7 MG/DL (ref 0–1)
BUN SERPL-MCNC: 22 MG/DL (ref 8–28)
CALCIUM SERPL-MCNC: 9.6 MG/DL (ref 8.5–10.5)
CHLORIDE BLD-SCNC: 105 MMOL/L (ref 98–107)
CHOLEST SERPL-MCNC: 163 MG/DL
CO2 SERPL-SCNC: 26 MMOL/L (ref 22–31)
CREAT SERPL-MCNC: 1.1 MG/DL (ref 0.6–1.1)
ERYTHROCYTE [DISTWIDTH] IN BLOOD BY AUTOMATED COUNT: 12.7 % (ref 11–14.5)
FASTING STATUS PATIENT QL REPORTED: YES
GFR SERPL CREATININE-BSD FRML MDRD: 47 ML/MIN/1.73M2
GLUCOSE BLD-MCNC: 96 MG/DL (ref 70–125)
HCT VFR BLD AUTO: 43.9 % (ref 35–47)
HDLC SERPL-MCNC: 60 MG/DL
HGB BLD-MCNC: 15 G/DL (ref 12–16)
LDLC SERPL CALC-MCNC: 84 MG/DL
MCH RBC QN AUTO: 33.4 PG (ref 27–34)
MCHC RBC AUTO-ENTMCNC: 34.2 G/DL (ref 32–36)
MCV RBC AUTO: 98 FL (ref 80–100)
PLATELET # BLD AUTO: 189 THOU/UL (ref 140–440)
PMV BLD AUTO: 8.6 FL (ref 7–10)
POTASSIUM BLD-SCNC: 4.1 MMOL/L (ref 3.5–5)
PROT SERPL-MCNC: 7.7 G/DL (ref 6–8)
RBC # BLD AUTO: 4.49 MILL/UL (ref 3.8–5.4)
SODIUM SERPL-SCNC: 141 MMOL/L (ref 136–145)
TRIGL SERPL-MCNC: 93 MG/DL
WBC: 4.7 THOU/UL (ref 4–11)

## 2020-09-01 RX ORDER — ATORVASTATIN CALCIUM 10 MG/1
10 TABLET, FILM COATED ORAL AT BEDTIME
Qty: 90 TABLET | Refills: 3 | Status: SHIPPED | OUTPATIENT
Start: 2020-09-01 | End: 2021-09-17

## 2020-09-01 RX ORDER — DILTIAZEM HYDROCHLORIDE 120 MG/1
120 CAPSULE, COATED, EXTENDED RELEASE ORAL DAILY
Qty: 90 CAPSULE | Refills: 3 | Status: SHIPPED | OUTPATIENT
Start: 2020-09-01 | End: 2021-10-12

## 2020-09-01 ASSESSMENT — MIFFLIN-ST. JEOR: SCORE: 1036.46

## 2020-09-02 ENCOUNTER — COMMUNICATION - HEALTHEAST (OUTPATIENT)
Dept: INTERNAL MEDICINE | Facility: CLINIC | Age: 85
End: 2020-09-02

## 2020-09-21 ENCOUNTER — OFFICE VISIT - HEALTHEAST (OUTPATIENT)
Dept: PHYSICAL THERAPY | Facility: REHABILITATION | Age: 85
End: 2020-09-21

## 2020-09-21 DIAGNOSIS — M54.50 CHRONIC BILATERAL LOW BACK PAIN WITHOUT SCIATICA: ICD-10-CM

## 2020-09-21 DIAGNOSIS — M62.81 WEAKNESS OF TRUNK MUSCULATURE: ICD-10-CM

## 2020-09-21 DIAGNOSIS — M62.81 GENERALIZED MUSCLE WEAKNESS: ICD-10-CM

## 2020-09-21 DIAGNOSIS — G89.29 CHRONIC BILATERAL LOW BACK PAIN WITHOUT SCIATICA: ICD-10-CM

## 2020-09-30 ENCOUNTER — OFFICE VISIT - HEALTHEAST (OUTPATIENT)
Dept: PHYSICAL THERAPY | Facility: REHABILITATION | Age: 85
End: 2020-09-30

## 2020-09-30 DIAGNOSIS — M62.81 WEAKNESS OF TRUNK MUSCULATURE: ICD-10-CM

## 2020-09-30 DIAGNOSIS — M62.81 GENERALIZED MUSCLE WEAKNESS: ICD-10-CM

## 2020-09-30 DIAGNOSIS — M54.50 CHRONIC BILATERAL LOW BACK PAIN WITHOUT SCIATICA: ICD-10-CM

## 2020-09-30 DIAGNOSIS — G89.29 CHRONIC BILATERAL LOW BACK PAIN WITHOUT SCIATICA: ICD-10-CM

## 2020-10-05 ENCOUNTER — OFFICE VISIT - HEALTHEAST (OUTPATIENT)
Dept: PHYSICAL THERAPY | Facility: REHABILITATION | Age: 85
End: 2020-10-05

## 2020-10-05 DIAGNOSIS — M62.81 WEAKNESS OF TRUNK MUSCULATURE: ICD-10-CM

## 2020-10-05 DIAGNOSIS — M62.81 GENERALIZED MUSCLE WEAKNESS: ICD-10-CM

## 2020-10-05 DIAGNOSIS — M54.50 CHRONIC BILATERAL LOW BACK PAIN WITHOUT SCIATICA: ICD-10-CM

## 2020-10-05 DIAGNOSIS — G89.29 CHRONIC BILATERAL LOW BACK PAIN WITHOUT SCIATICA: ICD-10-CM

## 2020-10-06 ENCOUNTER — AMBULATORY - HEALTHEAST (OUTPATIENT)
Dept: NURSING | Facility: CLINIC | Age: 85
End: 2020-10-06

## 2020-11-19 ENCOUNTER — RECORDS - HEALTHEAST (OUTPATIENT)
Dept: ADMINISTRATIVE | Facility: OTHER | Age: 85
End: 2020-11-19

## 2021-02-03 ENCOUNTER — COMMUNICATION - HEALTHEAST (OUTPATIENT)
Dept: TELEHEALTH | Facility: CLINIC | Age: 86
End: 2021-02-03

## 2021-02-04 ENCOUNTER — COMMUNICATION - HEALTHEAST (OUTPATIENT)
Dept: INTERNAL MEDICINE | Facility: CLINIC | Age: 86
End: 2021-02-04

## 2021-02-04 ENCOUNTER — AMBULATORY - HEALTHEAST (OUTPATIENT)
Dept: NURSING | Facility: CLINIC | Age: 86
End: 2021-02-04

## 2021-02-04 DIAGNOSIS — I10 ESSENTIAL HYPERTENSION: ICD-10-CM

## 2021-02-04 RX ORDER — LOSARTAN POTASSIUM AND HYDROCHLOROTHIAZIDE 12.5; 5 MG/1; MG/1
1 TABLET ORAL DAILY
Qty: 90 TABLET | Refills: 3 | Status: SHIPPED | OUTPATIENT
Start: 2021-02-04 | End: 2022-03-14

## 2021-02-25 ENCOUNTER — AMBULATORY - HEALTHEAST (OUTPATIENT)
Dept: NURSING | Facility: CLINIC | Age: 86
End: 2021-02-25

## 2021-04-23 ENCOUNTER — OFFICE VISIT - HEALTHEAST (OUTPATIENT)
Dept: INTERNAL MEDICINE | Facility: CLINIC | Age: 86
End: 2021-04-23

## 2021-04-23 DIAGNOSIS — M48.02 CERVICAL STENOSIS OF SPINAL CANAL: ICD-10-CM

## 2021-04-23 DIAGNOSIS — M48.062 SPINAL STENOSIS OF LUMBAR REGION WITH NEUROGENIC CLAUDICATION: ICD-10-CM

## 2021-04-23 DIAGNOSIS — E78.00 HYPERCHOLESTEREMIA: ICD-10-CM

## 2021-04-23 DIAGNOSIS — I10 ESSENTIAL HYPERTENSION: ICD-10-CM

## 2021-04-23 LAB
ANION GAP SERPL CALCULATED.3IONS-SCNC: 11 MMOL/L (ref 5–18)
BUN SERPL-MCNC: 29 MG/DL (ref 8–28)
CALCIUM SERPL-MCNC: 9.4 MG/DL (ref 8.5–10.5)
CHLORIDE BLD-SCNC: 103 MMOL/L (ref 98–107)
CO2 SERPL-SCNC: 26 MMOL/L (ref 22–31)
CREAT SERPL-MCNC: 1.07 MG/DL (ref 0.6–1.1)
GFR SERPL CREATININE-BSD FRML MDRD: 49 ML/MIN/1.73M2
GLUCOSE BLD-MCNC: 95 MG/DL (ref 70–125)
POTASSIUM BLD-SCNC: 4 MMOL/L (ref 3.5–5)
SODIUM SERPL-SCNC: 140 MMOL/L (ref 136–145)

## 2021-04-23 ASSESSMENT — MIFFLIN-ST. JEOR: SCORE: 1055.96

## 2021-04-26 ENCOUNTER — COMMUNICATION - HEALTHEAST (OUTPATIENT)
Dept: INTERNAL MEDICINE | Facility: CLINIC | Age: 86
End: 2021-04-26

## 2021-05-09 ENCOUNTER — RECORDS - HEALTHEAST (OUTPATIENT)
Dept: ADMINISTRATIVE | Facility: OTHER | Age: 86
End: 2021-05-09

## 2021-05-26 ENCOUNTER — RECORDS - HEALTHEAST (OUTPATIENT)
Dept: ADMINISTRATIVE | Facility: CLINIC | Age: 86
End: 2021-05-26

## 2021-05-29 NOTE — PROGRESS NOTES
Office Visit - Follow up    Kim Valdes   84 y.o. female    Date of Visit: 6/6/2019    Chief Complaint   Patient presents with     Cough     dry cough X1-2 weeks       Subjective: Maged is here primarily with symptoms of a mildly productive cough.  She has been in attendance during her 's prolonged illness in the hospital and now in transitional care.  She has been sleeping at the hospital or transitional care for the past 14 nights.  She has gotten little sleep.   had pneumonia she is concerned about possibility of pneumonia.  She denies fever chills or dyspnea    ROS: A comprehensive review of systems was performed and was otherwise negative except as mentioned above.     Exam  O2 sats as noted normal she is not in any respiratory distress head and neck unremarkable EENT negative lungs fair to good breath sounds bilaterally no signs of consolidation   /72 (Patient Site: Left Arm, Patient Position: Sitting, Cuff Size: Adult Regular)   Pulse 84   Temp 97.4  F (36.3  C) (Oral)   Wt 128 lb (58.1 kg)   SpO2 95%   Breastfeeding? No   BMI 20.66 kg/m      Assessment and Plan  Primarily has symptoms of cough or bronchitis.  Will treat symptomatically and I have strongly recommended she sleep at home rather than in the transitional care unit for at least the next 2 nights    Kim was seen today for cough.    Diagnoses and all orders for this visit:    Cough  -     promethazine-codeine (PHENERGAN WITH CODEINE) 6.25-10 mg/5 mL syrup; Take 5 mL by mouth every 4 (four) hours as needed for cough.          Time: total time spent with the patient was     15 minutes of which >50% was spent in counseling and coordination of care        Allergies   Allergen Reactions     Omeprazole        Medications :  Prior to Admission medications    Medication Sig Start Date End Date Taking? Authorizing Provider   aspirin 81 MG EC tablet Take 81 mg by mouth every evening.   Yes PROVIDER, HISTORICAL   atorvastatin  (LIPITOR) 10 MG tablet Take 1 tablet (10 mg total) by mouth at bedtime. 9/30/18  Yes Greg Rodriguez MD   CARTIA  mg 24 hr capsule TAKE ONE CAPSULE BY MOUTH ONCE DAILY 10/16/18  Yes Greg Rodriguez MD   losartan-hydrochlorothiazide (HYZAAR) 50-12.5 mg per tablet Take 1 tablet by mouth daily. 9/21/18  Yes Greg Rodriguez MD   promethazine-codeine (PHENERGAN WITH CODEINE) 6.25-10 mg/5 mL syrup Take 5 mL by mouth every 4 (four) hours as needed for cough. 6/6/19   Greg Rodriguez MD        Past Medical History: No past medical history on file.    Past Surgical History: No past surgical history on file.    Social History:   Social History     Socioeconomic History     Marital status:      Spouse name: Not on file     Number of children: Not on file     Years of education: Not on file     Highest education level: Not on file   Occupational History     Not on file   Social Needs     Financial resource strain: Not on file     Food insecurity:     Worry: Not on file     Inability: Not on file     Transportation needs:     Medical: Not on file     Non-medical: Not on file   Tobacco Use     Smoking status: Never Smoker     Smokeless tobacco: Never Used   Substance and Sexual Activity     Alcohol use: Not on file     Drug use: Not on file     Sexual activity: Not on file   Lifestyle     Physical activity:     Days per week: Not on file     Minutes per session: Not on file     Stress: Not on file   Relationships     Social connections:     Talks on phone: Not on file     Gets together: Not on file     Attends [employer] service: Not on file     Active member of club or organization: Not on file     Attends meetings of clubs or organizations: Not on file     Relationship status: Not on file     Intimate partner violence:     Fear of current or ex partner: Not on file     Emotionally abused: Not on file     Physically abused: Not on file     Forced sexual activity: Not on file   Other Topics Concern     Not on file    Social History Narrative     Not on file       Family History:   Family History   Problem Relation Age of Onset     Heart attack Father      Stroke Mother          Greg Rodriguez MD

## 2021-05-29 NOTE — TELEPHONE ENCOUNTER
Medication Request  Medication name: Alternative to the medication below.     promethazine-codeine (PHENERGAN WITH CODEINE) 6.25-10 mg/5 mL syrup 240 mL 1 6/6/2019     Sig - Route: Take 5 mL by mouth every 4 (four) hours as needed for cough. - Oral    Sent to pharmacy as: promethazine-codeine (PHENERGAN WITH CODEINE) 6.25-10 mg/5 mL syrup    E-Prescribing Status: Receipt confirmed by pharmacy (6/6/2019  1:15 PM CDT)      Pharmacy Name and Location: [facility] [address] [place] [address]   Reason for request: Per [facility] / [facility] [address] [place] [address] [place] the patient's insurance does not cover the above medications.   When did you use medication last?:  NA  Patient offered appointment:  No  Okay to leave a detailed message: no

## 2021-05-31 VITALS — BODY MASS INDEX: 22.34 KG/M2 | HEIGHT: 66 IN | WEIGHT: 139 LBS

## 2021-05-31 NOTE — PROGRESS NOTES
"Office Visit - Physical    Kim Valdes   84 y.o. female    Date of Visit: 8/29/2019    Chief Complaint   Patient presents with     Annual Wellness Visit     pt is fasting        Subjective: aMged is here for annual wellness visit and regular follow-up of hypertension and hyperlipidemia.  Overall she has been healthy without major health concerns.  She has been under a great deal of stress because of her 's illness.    Generally she has no new complaints or concerns.  Her symptoms of cough which were treated earlier this summer have resolved.    Current medications reviewed discussed and reconciled no changes.  Have history of benign monoclonal gammopathy intermittent SVT and previous history of cholecystitis with previous hysterectomy    Wellness information and assessment cognitive assessment and mood assessment are reviewed and discussed no change    Generally she feels well without new complaints    History of adenomatous polyps she is up-to-date with colonoscopy.  Immunizations are reviewed and discussed.        ROS: A comprehensive review of systems was performed and was otherwise negative except as mentioned above.    Exam   Alert and oriented head and neck negative NT negative lungs clear heart normal abdomen benign breasts negative extremities normal neuro negative cognitive function normal  /64 (Patient Site: Left Arm, Patient Position: Sitting, Cuff Size: Adult Regular)   Pulse 67   Resp 16   Ht 5' 6\" (1.676 m)   Wt 129 lb (58.5 kg)   SpO2 97%   BMI 20.82 kg/m      Assessment and Plan    Stable wellness visit and physical exam labs pending no changes    Kim was seen today for annual wellness visit.    Diagnoses and all orders for this visit:    Routine general medical examination at a health care facility  -     2(CBC w/o Differential); Future  -     Comprehensive Metabolic Panel; Future  -     Lipid Cascade  -     HM2(CBC w/o Differential)  -     Comprehensive Metabolic " Panel    Hypercholesteremia  -     HM2(CBC w/o Differential); Future  -     Comprehensive Metabolic Panel; Future  -     Lipid Cascade  -     HM2(CBC w/o Differential)  -     Comprehensive Metabolic Panel    Essential hypertension  -     HM2(CBC w/o Differential); Future  -     Comprehensive Metabolic Panel; Future  -     Lipid Cascade  -     HM2(CBC w/o Differential)  -     Comprehensive Metabolic Panel    Adenomatous polyp  -     HM2(CBC w/o Differential); Future  -     Comprehensive Metabolic Panel; Future  -     Lipid Cascade  -     HM2(CBC w/o Differential)  -     Comprehensive Metabolic Panel              Medications:   Prior to Admission medications    Medication Sig Start Date End Date Taking? Authorizing Provider   aspirin 81 MG EC tablet Take 81 mg by mouth every evening.   Yes PROVIDER, HISTORICAL   atorvastatin (LIPITOR) 10 MG tablet Take 1 tablet (10 mg total) by mouth at bedtime. 9/30/18  Yes Greg Rodriguez MD   CARTIA  mg 24 hr capsule TAKE ONE CAPSULE BY MOUTH ONCE DAILY 10/16/18  Yes Greg Rodriguez MD   mometasone (ELOCON) 0.1 % ointment Apply thin layer QD to affected area when symptoms present, once a week for maintenance 7/16/19  Yes PROVIDER, HISTORICAL       Allergies:  Allergies   Allergen Reactions     Omeprazole        Immunizations:   Immunization History   Administered Date(s) Administered     Influenza high dose,seasonal,PF, 65+ yrs 10/19/2015, 10/03/2016, 10/17/2017, 10/02/2018     Influenza, inj, historic,unspecified 11/06/2007, 10/31/2008, 11/25/2009, 10/20/2010, 11/18/2011     Influenza, seasonal,quad inj 6-35 mos 11/14/2012, 11/15/2013, 10/10/2014     Pneumo Conj 13-V (2010&after) 04/22/2016     Pneumo Polysac 23-V 10/30/2002, 12/28/2007     Td,adult,historic,unspecified 12/01/2009     Tdap 12/01/2009       Past Medical History: No past medical history on file.    Past Surgical History: No past surgical history on file.    Family History:   Family History   Problem Relation  Age of Onset     Heart attack Father      Stroke Mother        Social History:   Social History     Socioeconomic History     Marital status:      Spouse name: Not on file     Number of children: Not on file     Years of education: Not on file     Highest education level: Not on file   Occupational History     Not on file   Social Needs     Financial resource strain: Not on file     Food insecurity:     Worry: Not on file     Inability: Not on file     Transportation needs:     Medical: Not on file     Non-medical: Not on file   Tobacco Use     Smoking status: Never Smoker     Smokeless tobacco: Never Used   Substance and Sexual Activity     Alcohol use: Not on file     Drug use: Not on file     Sexual activity: Not on file   Lifestyle     Physical activity:     Days per week: Not on file     Minutes per session: Not on file     Stress: Not on file   Relationships     Social connections:     Talks on phone: Not on file     Gets together: Not on file     Attends [employer] service: Not on file     Active member of club or organization: Not on file     Attends meetings of clubs or organizations: Not on file     Relationship status: Not on file     Intimate partner violence:     Fear of current or ex partner: Not on file     Emotionally abused: Not on file     Physically abused: Not on file     Forced sexual activity: Not on file   Other Topics Concern     Not on file   Social History Narrative     Not on file         Greg Rodriguez MD      Assessment and Plan:           The patient's current medical problems were reviewed.      The following health maintenance schedule was reviewed with the patient and provided in printed form in the after visit summary:   Health Maintenance   Topic Date Due     ZOSTER VACCINES (1 of 2) 02/05/1986     DXA SCAN  02/05/2001     INFLUENZA VACCINE RULE BASED (1) 08/01/2019     MEDICARE ANNUAL WELLNESS VISIT  08/27/2019     TD 18+ HE  12/01/2019     FALL RISK ASSESSMENT  08/29/2020      ADVANCE CARE PLANNING  08/27/2023     PNEUMOCOCCAL POLYSACCHARIDE VACCINE AGE 65 AND OVER  Completed     PNEUMOCOCCAL CONJUGATE VACCINE FOR ADULTS (PCV13 OR PREVNAR)  Completed        Subjective:   Chief Complaint: Kim Valdes is an 84 y.o. female here for an Annual Wellness visit.   HPI:      Review of Systems:    Please see above.  The rest of the review of systems are negative for all systems.    Patient Care Team:  Greg Rodriguez MD as PCP - General  Hugh Leigh DPM (General Surgery)     Patient Active Problem List   Diagnosis     Symptomatic Menopause     Hypercholesteremia     Dyspepsia     Cholecystitis     Serum Enzyme Levels - ALT (SGPT) Elevated     Cough     Benign Monoclonal Hypergammaglobulinemia     Diverticulosis     Adenomatous polyp     Pleurodynia     SVT (supraventricular tachycardia) (H)     Essential hypertension     Shingles     Palpitations     Low back ache     No past medical history on file.   No past surgical history on file.   Family History   Problem Relation Age of Onset     Heart attack Father      Stroke Mother       Social History     Socioeconomic History     Marital status:      Spouse name: Not on file     Number of children: Not on file     Years of education: Not on file     Highest education level: Not on file   Occupational History     Not on file   Social Needs     Financial resource strain: Not on file     Food insecurity:     Worry: Not on file     Inability: Not on file     Transportation needs:     Medical: Not on file     Non-medical: Not on file   Tobacco Use     Smoking status: Never Smoker     Smokeless tobacco: Never Used   Substance and Sexual Activity     Alcohol use: Not on file     Drug use: Not on file     Sexual activity: Not on file   Lifestyle     Physical activity:     Days per week: Not on file     Minutes per session: Not on file     Stress: Not on file   Relationships     Social connections:     Talks on phone: Not on file     Gets  "together: Not on file     Attends Orthodoxy service: Not on file     Active member of club or organization: Not on file     Attends meetings of clubs or organizations: Not on file     Relationship status: Not on file     Intimate partner violence:     Fear of current or ex partner: Not on file     Emotionally abused: Not on file     Physically abused: Not on file     Forced sexual activity: Not on file   Other Topics Concern     Not on file   Social History Narrative     Not on file      Current Outpatient Medications   Medication Sig Dispense Refill     aspirin 81 MG EC tablet Take 81 mg by mouth every evening.       atorvastatin (LIPITOR) 10 MG tablet Take 1 tablet (10 mg total) by mouth at bedtime. 90 tablet 3     CARTIA  mg 24 hr capsule TAKE ONE CAPSULE BY MOUTH ONCE DAILY 90 capsule 3     mometasone (ELOCON) 0.1 % ointment Apply thin layer QD to affected area when symptoms present, once a week for maintenance       No current facility-administered medications for this visit.       Objective:   Vital Signs:   Visit Vitals  /64 (Patient Site: Left Arm, Patient Position: Sitting, Cuff Size: Adult Regular)   Pulse 67   Resp 16   Ht 5' 6\" (1.676 m)   Wt 129 lb (58.5 kg)   SpO2 97%   BMI 20.82 kg/m         VisionScreening:  No exam data present     PHYSICAL EXAM      Assessment Results 8/29/2019   Activities of Daily Living No help needed   Instrumental Activities of Daily Living No help needed   Get Up and Go Score Less than 12 seconds   Mini Cog Total Score 5   Some recent data might be hidden     A Mini-Cog score of 0-2 suggests the possibility of dementia, score of 3-5 suggests no dementia    Identified Health Risks:     She is at risk for lack of exercise and has been provided with information to increase physical activity for the benefit of her well-being.  The patient was counseled and encouraged to consider modifying their diet and eating habits. She was provided with information on recommended " healthy diet options.  Patient's advanced directive was discussed and the patient's wishes.

## 2021-06-01 VITALS — WEIGHT: 131 LBS | BODY MASS INDEX: 21.05 KG/M2 | HEIGHT: 66 IN

## 2021-06-01 NOTE — TELEPHONE ENCOUNTER
Refill Approved    Rx renewed per Medication Renewal Policy. Medication was last renewed on 9/30/2018 for 90/3  Last OV 8/29/2019 PE  Jesusita Paiz, [facility] Triage/Med Refill 9/29/2019     Requested Prescriptions   Pending Prescriptions Disp Refills     atorvastatin (LIPITOR) 10 MG tablet [Pharmacy Med Name: ATORVASTATIN 10MG   TAB] 90 tablet 3     Sig: TAKE 1 TABLET BY MOUTH AT BEDTIME       Statins Refill Protocol (Hmg CoA Reductase Inhibitors) Passed - 9/28/2019 10:00 AM        Passed - PCP or prescribing provider visit in past 12 months      Last office visit with prescriber/PCP: 6/6/2019 Greg Rodriguez MD OR same dept: 6/6/2019 Greg Rodriguez MD OR same specialty: 6/6/2019 Greg Rodriguez MD  Last physical: 8/29/2019 Last MTM visit: Visit date not found   Next visit within 3 mo: Visit date not found  Next physical within 3 mo: Visit date not found  Prescriber OR PCP: Greg Rodriguez MD  Last diagnosis associated with med order: 1. Hyperlipidemia  - atorvastatin (LIPITOR) 10 MG tablet [Pharmacy Med Name: ATORVASTATIN 10MG   TAB]; TAKE 1 TABLET BY MOUTH AT BEDTIME  Dispense: 90 tablet; Refill: 3    If protocol passes may refill for 12 months if within 3 months of last provider visit (or a total of 15 months).

## 2021-06-02 VITALS — HEIGHT: 66 IN | WEIGHT: 131 LBS | BODY MASS INDEX: 21.05 KG/M2

## 2021-06-02 VITALS — BODY MASS INDEX: 20.41 KG/M2 | HEIGHT: 66 IN | WEIGHT: 127 LBS

## 2021-06-02 NOTE — PATIENT INSTRUCTIONS - HE
84-year-old lady, who is been doing quite well and is establishing primary care with me, previously with Dr. Rodriguez.  Issues are hypertension that is on treatment, mild renal insufficiency, history of benign monoclonal gammopathy, hyperlipidemia on treatment, intermittent supraventricular tachycardia on treatment, menopause with history of previous left oophrectomy, history of adenomatous colon polyps, vulvar lichen sclerosus,     This morning I am going to send her to the lab to do a basic metabolic panel, serum protein electrophoresis with immunofixation to check the status of the monoclonal gammopathy, also check her thyroid status.    Going issue by issue:    2.  Hypertension on treatment.  Today's systolic blood pressures little bit elevated.  Previous readings have been okay.  I encouraged her to bring her home blood pressure machine here to the clinic during a nurses appointment to have the machine validated against a manual reading so that she can trust her home machine.  I would consider her systolic blood pressure goal to be less than 135, resting and in the seated position.    Current medications are losartan hydrochlorothiazide as a fixed dose combination pill and also she takes Cartia XT for supraventricular tachycardia suppression, but that also has blood pressure lowering effects.    She has had some mildly elevated creatinine of about 1.3-1.4 which could be from prerenal azotemia (I told her that means dehydration).  We might consider dropping the hydrochlorothiazide component   If her blood pressure is otherwise well controlled.    I asked her to come back in 1 month so that we can recheck blood pressure and make a decision about hydrochlorothiazide.    3.  Mild renal insufficiency as mentioned above.  Will check metabolic panel today.  I reminded her to stay well-hydrated.    4.  History of benign monoclonal gammopathy.  Will check serum protein letter pheresis and immunofixation.  We want to make  sure it is not contributing to the elevated creatinine.    5.  Hyperlipidemia on treatment.  Lipid profile looks great when checked August 29.  Continue on atorvastatin 10 milligrams a day.    6.  Supraventricular tachycardia on Cartia XT for the last 2 years.  She only gets occasional brief fluttering episodes, nothing sustained.    6.  Menopause with history of previous left oophrectomy.  Uterus is still in place.    7.  History of adenomatous colon polyp.  Last colonoscopy was October 2015, and she has stopped colon screening.    8.  Vulvar lichen sclerosus, for which she uses topical steroid ointment with good effect.    9.  We will get seasonal flu shot today.  I will also suggested the Shingrix and tetanus booster (Td) vaccine, which she should get at a community pharmacy, because it is covered under her Medicare drug benefit.

## 2021-06-02 NOTE — TELEPHONE ENCOUNTER
Refill Approved    Rx renewed per Medication Renewal Policy. Medication was last renewed on 10/16/18.    Janie Carlson, [facility] Triage/Med Refill 10/24/2019     Requested Prescriptions   Pending Prescriptions Disp Refills     CARTIA  mg 24 hr capsule [Pharmacy Med Name: CARTIA /24HR  CAP] 90 capsule 3     Sig: TAKE 1 CAPSULE BY MOUTH ONCE DAILY       Calcium-Channel Blockers Protocol Passed - 10/24/2019 11:44 AM        Passed - PCP or prescribing provider visit in past 12 months or next 3 months     Last office visit with prescriber/PCP: 6/6/2019 Greg Rodriguez MD OR same dept: 10/23/2019 Bryan Way MD OR same specialty: 10/23/2019 Bryan Way MD  Last physical: 8/29/2019 Last MTM visit: Visit date not found   Next visit within 3 mo: Visit date not found  Next physical within 3 mo: Visit date not found  Prescriber OR PCP: Greg Rodriguez MD  Last diagnosis associated with med order: 1. Essential hypertension with goal blood pressure less than 140/90  - CARTIA  mg 24 hr capsule [Pharmacy Med Name: CARTIA /24HR  CAP]; TAKE 1 CAPSULE BY MOUTH ONCE DAILY  Dispense: 90 capsule; Refill: 3    If protocol passes may refill for 12 months if within 3 months of last provider visit (or a total of 15 months).             Passed - Blood pressure filed in past 12 months     BP Readings from Last 1 Encounters:   10/23/19 168/80

## 2021-06-02 NOTE — TELEPHONE ENCOUNTER
New Appointment Needed  What is the reason for the visit:    Back to back Flu   Provider Preference: Any available  How soon do you need to be seen?: 10/21 afternoon or 10/22 anytime back to back with spouse unable to schedule  Waitlist offered?: No  Okay to leave a detailed message:  Yes

## 2021-06-02 NOTE — PROGRESS NOTES
Office Visit - Follow Up   Kim Valdes   84 y.o. female    Date of Visit: 10/23/2019    Chief Complaint   Patient presents with     Establish Care     former patient of Dr Rodriguez        Assessment and Plan     1.  84-year-old lady, who is been doing quite well and is establishing primary care with me, previously with Dr. Rodriguez.  Issues are hypertension that is on treatment, mild renal insufficiency, history of benign monoclonal gammopathy, hyperlipidemia on treatment, intermittent supraventricular tachycardia on treatment, menopause with history of previous left oophrectomy, history of adenomatous colon polyps, vulvar lichen sclerosus,     This morning I am going to send her to the lab to do a basic metabolic panel, serum protein electrophoresis with immunofixation to check the status of the monoclonal gammopathy, also check her thyroid status.    Going issue by issue:    2.  Hypertension on treatment.  Today's systolic blood pressures little bit elevated.  Previous readings have been okay.  I encouraged her to bring her home blood pressure machine here to the clinic during a nurses appointment to have the machine validated against a manual reading so that she can trust her home machine.  I would consider her systolic blood pressure goal to be less than 135, resting and in the seated position.    Current medications are losartan hydrochlorothiazide as a fixed dose combination pill and also she takes Cartia XT for supraventricular tachycardia suppression, but that also has blood pressure lowering effects.    She has had some mildly elevated creatinine of about 1.3-1.4 which could be from prerenal azotemia (I told her that means dehydration).  We might consider dropping the hydrochlorothiazide component   If her blood pressure is otherwise well controlled.    I asked her to come back in 1 month so that we can recheck blood pressure and make a decision about hydrochlorothiazide.    3.  Mild renal insufficiency as  mentioned above.  Will check metabolic panel today.  I reminded her to stay well-hydrated.    4.  History of benign monoclonal gammopathy.  Will check serum protein letter pheresis and immunofixation.  We want to make sure it is not contributing to the elevated creatinine.    5.  Hyperlipidemia on treatment.  Lipid profile looks great when checked August 29.  Continue on atorvastatin 10 milligrams a day.    6.  Supraventricular tachycardia on Cartia XT for the last 2 years.  She only gets occasional brief fluttering episodes, nothing sustained.    6.  Menopause with history of previous left oophrectomy.  Uterus is still in place.    7.  History of adenomatous colon polyp.  Last colonoscopy was October 2015, and she has stopped colon screening.    8.  Vulvar lichen sclerosus, for which she uses topical steroid ointment with good effect.    9.  We will get seasonal flu shot today.  I will also suggested the Shingrix and tetanus booster (Td) vaccine, which she should get at a community pharmacy, because it is covered under her Medicare drug benefit.         History of Present Illness   This 84 y.o. old establishing primary care with me, previously with Dr. Rodriguez.      Issues are hypertension that is on treatment, mild renal insufficiency, history of benign monoclonal gammopathy, hyperlipidemia on treatment, intermittent supraventricular tachycardia on treatment, menopause with history of previous left oophrectomy, history of adenomatous colon polyps, vulvar lichen sclerosus,     Still has lingering cough from a cold.  Now several weeks.  No sputum.  Throat clearing.  A bit worse in the morning.    Hypertension  losartan-hydrochlorothiazide (HYZAAR) 50-12.5 mg per tablet  CARTIA  mg 24 hr capsule    BP Readings from Last 3 Encounters:   10/23/19 158/74   08/29/19 118/64   06/06/19 132/72     Hyperlipidemia-- well controlled  Lab Results   Component Value Date    CHOL 163 08/29/2019    CHOL 185 08/27/2018    CHOL  173 08/21/2017     Lab Results   Component Value Date    HDL 62 08/29/2019    HDL 61 08/27/2018    HDL 58 08/21/2017     Lab Results   Component Value Date    LDLCALC 83 08/29/2019    LDLCALC 106 08/27/2018    LDLCALC 98 08/21/2017     Lab Results   Component Value Date    TRIG 89 08/29/2019    TRIG 92 08/27/2018    TRIG 83 08/21/2017     No components found for: CHOLHDL    history of benign monoclonal gammopathy    Intermittent SVT  2 years ago seen ED  CARTIA  mg 24 hr capsule  Nowadays, happens once every couple months, lasts few minutes    History of cholecystitis    Previous left oophrectomy, uterus is still there     adenomatous polyps she is up-to-date with colonoscopy.  Immunizations are reviewed and discussed.    Vulvar lichen sclerosis  mometasone (ELOCON) 0.1 % ointment    Immunization History   Administered Date(s) Administered     Influenza high dose,seasonal,PF, 65+ yrs 10/19/2015, 10/03/2016, 10/17/2017, 10/02/2018     Influenza, inj, historic,unspecified 11/06/2007, 10/31/2008, 11/25/2009, 10/20/2010, 11/18/2011     Influenza, seasonal,quad inj 6-35 mos 11/14/2012, 11/15/2013, 10/10/2014     Pneumo Conj 13-V (2010&after) 04/22/2016     Pneumo Polysac 23-V 10/30/2002, 12/28/2007     Td,adult,historic,unspecified 12/01/2009     Tdap 12/01/2009          Medications, Allergies, Social, and Problem List   Current Outpatient Medications   Medication Sig Dispense Refill     aspirin 81 MG EC tablet Take 81 mg by mouth every evening.       atorvastatin (LIPITOR) 10 MG tablet Take 1 tablet (10 mg total) by mouth at bedtime. 90 tablet 3     CARTIA  mg 24 hr capsule TAKE ONE CAPSULE BY MOUTH ONCE DAILY 90 capsule 3     losartan-hydrochlorothiazide (HYZAAR) 50-12.5 mg per tablet Take by mouth. Takes half tablet daily             mometasone (ELOCON) 0.1 % ointment Apply thin layer QD to affected area when symptoms present, once a week for maintenance       No current facility-administered medications  "for this visit.      Allergies   Allergen Reactions     Omeprazole      Social History     Tobacco Use     Smoking status: Never Smoker     Smokeless tobacco: Never Used   Substance Use Topics     Alcohol use: Not on file     Drug use: Not on file     Patient Active Problem List   Diagnosis     Symptomatic Menopause     Hypercholesteremia     Cough     Benign Monoclonal Hypergammaglobulinemia     Diverticulosis     Adenomatous polyp     SVT (supraventricular tachycardia) (H)     Essential hypertension     Renal insufficiency      Reviewed, reconciled and updated       Physical Exam   General Appearance: Very pleasant, normal mental status, appears well.  She had a little throat clearing cough here in the exam room.    /74 (Patient Site: Left Arm, Patient Position: Sitting, Cuff Size: Adult Large)   Pulse 80   Temp 97.7  F (36.5  C)   Ht 5' 6\" (1.676 m)   Wt 132 lb (59.9 kg)   SpO2 95%   BMI 21.31 kg/m      Breathing peripherally.  Lungs clear to auscultation  Heart regular rate and rhythm, no murmur  Abdomen nontender  Extremities no edema     Additional Information   I spent 25 minutes face time with the patient, with > 50% counseling, explaining and discussing with the patient the issues enumerated in the Assessment and Plan section of this note and answering questions. Afterwards, the patient was given a printout of the AVS, with attention to the content in the Patient Instruction section.       Bryan Way MD    "

## 2021-06-03 VITALS
DIASTOLIC BLOOD PRESSURE: 80 MMHG | OXYGEN SATURATION: 95 % | TEMPERATURE: 97.7 F | SYSTOLIC BLOOD PRESSURE: 168 MMHG | WEIGHT: 132 LBS | BODY MASS INDEX: 21.21 KG/M2 | HEIGHT: 66 IN | HEART RATE: 80 BPM

## 2021-06-03 VITALS — BODY MASS INDEX: 20.66 KG/M2 | WEIGHT: 128 LBS

## 2021-06-03 VITALS — HEIGHT: 66 IN | BODY MASS INDEX: 20.73 KG/M2 | WEIGHT: 129 LBS

## 2021-06-03 NOTE — PROGRESS NOTES
Office Visit - Follow Up   Kim Valdes   84 y.o. female    Date of Visit: 11/22/2019    Chief Complaint   Patient presents with     Follow-up     1 month- blood presure        Assessment and Plan     1.  Hypertension with elevated systolic component, which I believe is probably from arterial compliance, I think is on a reasonable blood pressure regime; BUN and creatinine normal when measured on October 23, 2019.    BP Readings from Last 3 Encounters:   11/22/19 144/68   10/23/19 168/80   08/29/19 118/64     Today's in clinic blood pressure measured in her right arm was 144/68, very similar to what she gets with her home machine.  I think that is a reasonable blood pressure level for her.     Current medications are losartan hydrochlorothiazide as a fixed dose combination pill and also she takes Cartia XT for supraventricular tachycardia suppression, but that also has blood pressure lowering effects.     Her metabolic panel checked on October 23 was satisfactory with normal potassium, BUN, and creatinine of 0.93, which is now normalized compared to previous readings of about 1.23.    2.  History of benign monoclonal gammopathy.    Serum protein electrophoresis from specimen drawn October 23, 2019 showed no monoclonal protein.  I think we can stop checking.     3.  Hyperlipidemia on treatment.  Lipid profile looks great when checked August 29.  Continue on atorvastatin 10 milligrams a day.     4.  Supraventricular tachycardia on Cartia XT for the last 2 years.  She only gets occasional brief fluttering episodes, nothing sustained.     5.  Menopause with history of previous left oophrectomy.  Uterus is still in place.     6.  History of adenomatous colon polyp.  Last colonoscopy was October 2015, and she has stopped colon screening.     7.  Vulvar lichen sclerosus, for which she uses topical steroid ointment with good effect.     8.    I told her to get her tetanus booster at a community pharmacy under her  Medicare drug benefit.  She is already received her seasonal influenza vaccine.  She has received Prevnar 13 and pneumococcal 23 polysaccharide already.  Immunization History   Administered Date(s) Administered     Influenza high dose,seasonal,PF, 65+ yrs 10/19/2015, 10/03/2016, 10/17/2017, 10/02/2018, 10/23/2019     Influenza, inj, historic,unspecified 11/06/2007, 10/31/2008, 11/25/2009, 10/20/2010, 11/18/2011     Influenza, seasonal,quad inj 6-35 mos 11/14/2012, 11/15/2013, 10/10/2014     Pneumo Conj 13-V (2010&after) 04/22/2016     Pneumo Polysac 23-V 10/30/2002, 12/28/2007     Td,adult,historic,unspecified 12/01/2009     Tdap 12/01/2009     See back in 6 months.       History of Present Illness   This 84 y.o. old follow-up from initial visit with Me 10-23-19    Main focus his blood pressure recheck     Issues are hypertension that is on treatment, mild renal insufficiency, history of benign monoclonal gammopathy, hyperlipidemia on treatment, intermittent supraventricular tachycardia on treatment, menopause with history of previous left oophrectomy, history of adenomatous colon polyps, vulvar lichen sclerosus,      elevated systolic component, which I believe is probably from arterial compliance, I think is on a reasonable blood pressure regime; BUN and creatinine normal when measured on October 23, 2019.    BP Readings from Last 3 Encounters:   11/22/19 144/68   10/23/19 168/80   08/29/19 118/64     Today's in clinic blood pressure measured in her right arm was 144/68, very similar to what she gets with her home machine.  I think that is a reasonable blood pressure level for her.     Current medications are losartan hydrochlorothiazide as a fixed dose combination pill and also she takes Cartia XT for supraventricular tachycardia suppression, but that also has blood pressure lowering effects.     Her metabolic panel checked on October 23 was satisfactory with normal potassium, BUN, and creatinine of 0.93, which  is now normalized compared to previous readings of about 1.23.       Medications, Allergies, Social, and Problem List   Current Outpatient Medications   Medication Sig Dispense Refill     aspirin 81 MG EC tablet Take 81 mg by mouth every evening.       atorvastatin (LIPITOR) 10 MG tablet Take 1 tablet (10 mg total) by mouth at bedtime. 90 tablet 3     CARTIA  mg 24 hr capsule TAKE 1 CAPSULE BY MOUTH ONCE DAILY 90 capsule 3     losartan-hydrochlorothiazide (HYZAAR) 50-12.5 mg per tablet Take 0.5 tablets by mouth daily. Takes half tablet daily 45 tablet 3     mometasone (ELOCON) 0.1 % ointment Apply thin layer QD to affected area when symptoms present, once a week for maintenance       No current facility-administered medications for this visit.      Allergies   Allergen Reactions     Omeprazole      Social History     Tobacco Use     Smoking status: Never Smoker     Smokeless tobacco: Never Used   Substance Use Topics     Alcohol use: Not on file     Drug use: Not on file     Patient Active Problem List   Diagnosis     Symptomatic Menopause     Hypercholesteremia     Cough     Adenomatous polyp     SVT (supraventricular tachycardia) (H)     Essential hypertension     Renal insufficiency      Reviewed, reconciled and updated       Physical Exam   General Appearance:   Very pleasant, normal mental status, breathing comfortably.    I manually measured her blood pressure and confirmed the reading below.  Right arm seated position    /68 (Patient Site: Left Arm, Patient Position: Sitting) Comment: pt [facility]  Pulse 71   Wt 131 lb (59.4 kg)   SpO2 97%   BMI 21.14 kg/m           Additional Information        Bryan Way MD

## 2021-06-03 NOTE — TELEPHONE ENCOUNTER
Medication Request  Medication name:   losartan-hydrochlorothiazide (HYZAAR) 50-12.5 mg per tablet   10/9/2017  --   Sig - Route: Take by mouth. Takes half tablet daily        - Oral   Class: Historical Med       Pharmacy Name and Location: [place] [address]  Reason for request: Refill requested for above medication, medication is listed as historical.     When did you use medication last?:  Last fill 6.12.2018  Patient offered appointment:  n/a  Okay to leave a detailed message: yes

## 2021-06-03 NOTE — PATIENT INSTRUCTIONS - HE
1.  Hypertension with elevated systolic component, which I believe is probably from arterial compliance, I think is on a reasonable blood pressure regime; BUN and creatinine normal when measured on October 23, 2019.    BP Readings from Last 3 Encounters:   11/22/19 144/68   10/23/19 168/80   08/29/19 118/64     Today's in clinic blood pressure measured in her right arm was 144/68, very similar to what she gets with her home machine.  I think that is a reasonable blood pressure level for her.     Current medications are losartan hydrochlorothiazide as a fixed dose combination pill and also she takes Cartia XT for supraventricular tachycardia suppression, but that also has blood pressure lowering effects.     Her metabolic panel checked on October 23 was satisfactory with normal potassium, BUN, and creatinine of 0.93, which is now normalized compared to previous readings of about 1.23.    2.  History of benign monoclonal gammopathy.    Serum protein electrophoresis from specimen drawn October 23, 2019 showed no monoclonal protein.  I think we can stop checking.     3.  Hyperlipidemia on treatment.  Lipid profile looks great when checked August 29.  Continue on atorvastatin 10 milligrams a day.     4.  Supraventricular tachycardia on Cartia XT for the last 2 years.  She only gets occasional brief fluttering episodes, nothing sustained.     5.  Menopause with history of previous left oophrectomy.  Uterus is still in place.     6.  History of adenomatous colon polyp.  Last colonoscopy was October 2015, and she has stopped colon screening.     7.  Vulvar lichen sclerosus, for which she uses topical steroid ointment with good effect.     8.    I told her to get her tetanus booster at a community pharmacy under her Medicare drug benefit.  She is already received her seasonal influenza vaccine.  She has received Prevnar 13 and pneumococcal 23 polysaccharide already.  Immunization History   Administered Date(s) Administered      Influenza high dose,seasonal,PF, 65+ yrs 10/19/2015, 10/03/2016, 10/17/2017, 10/02/2018, 10/23/2019     Influenza, inj, historic,unspecified 11/06/2007, 10/31/2008, 11/25/2009, 10/20/2010, 11/18/2011     Influenza, seasonal,quad inj 6-35 mos 11/14/2012, 11/15/2013, 10/10/2014     Pneumo Conj 13-V (2010&after) 04/22/2016     Pneumo Polysac 23-V 10/30/2002, 12/28/2007     Td,adult,historic,unspecified 12/01/2009     Tdap 12/01/2009     See back in 6 months.

## 2021-06-04 VITALS
HEART RATE: 68 BPM | OXYGEN SATURATION: 96 % | BODY MASS INDEX: 20.36 KG/M2 | HEIGHT: 66 IN | DIASTOLIC BLOOD PRESSURE: 70 MMHG | SYSTOLIC BLOOD PRESSURE: 144 MMHG | TEMPERATURE: 97.5 F | WEIGHT: 126.7 LBS

## 2021-06-04 VITALS
SYSTOLIC BLOOD PRESSURE: 144 MMHG | OXYGEN SATURATION: 97 % | DIASTOLIC BLOOD PRESSURE: 68 MMHG | BODY MASS INDEX: 21.14 KG/M2 | WEIGHT: 131 LBS | HEART RATE: 71 BPM

## 2021-06-04 VITALS
SYSTOLIC BLOOD PRESSURE: 132 MMHG | RESPIRATION RATE: 16 BRPM | DIASTOLIC BLOOD PRESSURE: 66 MMHG | BODY MASS INDEX: 20.73 KG/M2 | HEART RATE: 77 BPM | WEIGHT: 129 LBS | TEMPERATURE: 97.7 F | HEIGHT: 66 IN | OXYGEN SATURATION: 97 %

## 2021-06-04 VITALS
SYSTOLIC BLOOD PRESSURE: 149 MMHG | WEIGHT: 131 LBS | HEART RATE: 82 BPM | OXYGEN SATURATION: 100 % | DIASTOLIC BLOOD PRESSURE: 74 MMHG | BODY MASS INDEX: 21.83 KG/M2 | HEIGHT: 65 IN

## 2021-06-04 VITALS — HEIGHT: 65 IN | WEIGHT: 131.1 LBS | BODY MASS INDEX: 21.84 KG/M2

## 2021-06-05 VITALS
BODY MASS INDEX: 21.05 KG/M2 | OXYGEN SATURATION: 97 % | HEART RATE: 60 BPM | WEIGHT: 131 LBS | HEIGHT: 66 IN | SYSTOLIC BLOOD PRESSURE: 120 MMHG | DIASTOLIC BLOOD PRESSURE: 80 MMHG

## 2021-06-05 NOTE — PATIENT INSTRUCTIONS - HE
Symptoms compatible with neurogenic claudication and right lower extremity radiculopathic symptoms with subtle findings of distal muscle weakness, likely representing advanced lumbar spondylosis and possibly central canal stenosis    This is a new symptom that has been bothering her for about 1 month.  She describes pain in both buttocks, going into both lower extremities, right worse than left.  She is able to sleep comfortably, but the pain and stiffness are worse when she first gets out of bed.  They get a little bit better once she has become more active over the course of the morning, but she will have pain if she stands in place.  Sitting down gives her relief.    On examination today, I did not find any areas of tenderness.  She is able to flex forward pretty readily.  Her patellar reflexes are intact.  Ankle reflexes are minimal but I believe present.  She does have mild weakness, graded 4/5 of right toe dorsiflexors.    I am going to get a lumbar spine x-ray today, to look for any gross morphologic problem.  Also will send her to the spine clinic.  I told her that the goal would be to improve functioning and relieve pain, with a conservative approach, emphasizing physical therapy and exercises, gait training, and avoiding surgery.    2.  Hypertension with elevated systolic component, which I believe is probably from arterial compliance, I think is on a reasonable blood pressure regime; BUN and creatinine normal when measured on October 23, 2019.  Blood pressure is pretty good today.     BP Readings from Last 3 Encounters:   01/29/20 132/66   11/22/19 144/68   10/23/19 168/80        Current medications are losartan hydrochlorothiazide as a fixed dose combination pill and also she takes Cartia XT for supraventricular tachycardia suppression, but that also has blood pressure lowering effects.     Her metabolic panel checked on October 23 was satisfactory with normal potassium, BUN, and creatinine of 0.93,  which is now normalized compared to previous readings of about 1.23.     3.  History of benign monoclonal gammopathy.    Serum protein electrophoresis from specimen drawn October 23, 2019 showed no monoclonal protein.  I think we can stop checking.     4.  Hyperlipidemia on treatment.  Lipid profile looks great when checked August 29.  Continue on atorvastatin 10 milligrams a day.     5.  Supraventricular tachycardia on Cartia XT for the last 2 years.  She only gets occasional brief fluttering episodes, nothing sustained.     6.  Menopause with history of previous left oophrectomy.  Uterus is still in place.     7.  History of adenomatous colon polyp.  Last colonoscopy was October 2015, and she has stopped colon screening.     8.  Vulvar lichen sclerosus, for which she uses topical steroid ointment with good effect.     9.    I told her to get her tetanus booster at a community pharmacy under her Medicare drug benefit.  She is already received her seasonal influenza vaccine.  She has received Prevnar 13 and pneumococcal 23 polysaccharide already.    I like to see her back in 3 months, about the same time as her .

## 2021-06-05 NOTE — PROGRESS NOTES
Office Visit - Follow Up   Kim Valdes   84 y.o. female    Date of Visit: 1/29/2020    Chief Complaint   Patient presents with     Back Pain     concern recent LBP for 3-4 wks pt has been helping  get in and out of bed, wondering if poss sciatica        Assessment and Plan     Symptoms compatible with neurogenic claudication and right lower extremity radiculopathic symptoms with subtle findings of distal muscle weakness, likely representing advanced lumbar spondylosis and possibly central canal stenosis    This is a new symptom that has been bothering her for about 1 month.  She describes pain in both buttocks, going into both lower extremities, right worse than left.  She is able to sleep comfortably, but the pain and stiffness are worse when she first gets out of bed.  They get a little bit better once she has become more active over the course of the morning, but she will have pain if she stands in place.  Sitting down gives her relief.    On examination today, I did not find any areas of tenderness.  She is able to flex forward pretty readily.  Her patellar reflexes are intact.  Ankle reflexes are minimal but I believe present.  She does have mild weakness, graded 4/5 of right toe dorsiflexors.    I am going to get a lumbar spine x-ray today, to look for any gross morphologic problem.  Also will send her to the spine clinic.  I told her that the goal would be to improve functioning and relieve pain, with a conservative approach, emphasizing physical therapy and exercises, gait training, and avoiding surgery.    2.  Hypertension with elevated systolic component, which I believe is probably from arterial compliance, I think is on a reasonable blood pressure regime; BUN and creatinine normal when measured on October 23, 2019.  Blood pressure is pretty good today.     BP Readings from Last 3 Encounters:   01/29/20 132/66   11/22/19 144/68   10/23/19 168/80        Current medications are losartan  hydrochlorothiazide as a fixed dose combination pill and also she takes Cartia XT for supraventricular tachycardia suppression, but that also has blood pressure lowering effects.     Her metabolic panel checked on October 23 was satisfactory with normal potassium, BUN, and creatinine of 0.93, which is now normalized compared to previous readings of about 1.23.     3.  History of benign monoclonal gammopathy.    Serum protein electrophoresis from specimen drawn October 23, 2019 showed no monoclonal protein.  I think we can stop checking.     4.  Hyperlipidemia on treatment.  Lipid profile looks great when checked August 29.  Continue on atorvastatin 10 milligrams a day.     5.  Supraventricular tachycardia on Cartia XT for the last 2 years.  She only gets occasional brief fluttering episodes, nothing sustained.     6.  Menopause with history of previous left oophrectomy.  Uterus is still in place.     7.  History of adenomatous colon polyp.  Last colonoscopy was October 2015, and she has stopped colon screening.     8.  Vulvar lichen sclerosus, for which she uses topical steroid ointment with good effect.     9.    I told her to get her tetanus booster at a community pharmacy under her Medicare drug benefit.  She is already received her seasonal influenza vaccine.  She has received Prevnar 13 and pneumococcal 23 polysaccharide already.    I like to see her back in 3 months, about the same time as her .       History of Present Illness   This 84 y.o. old comes accompanied by her  for evaluation of low back pain radiating into the buttocks and thighs    Previous visit with me 11-22-19    This is a new symptom that has been bothering her for about 1 month.  She describes pain in both buttocks, going into both lower extremities, right worse than left.  She is able to sleep comfortably, but the pain and stiffness are worse when she first gets out of bed.  They get a little bit better once she has become more  "active over the course of the morning, but she will have pain if she stands in place.  Sitting down gives her relief.         Medications, Allergies, Social, and Problem List   Current Outpatient Medications   Medication Sig Dispense Refill     aspirin 81 MG EC tablet Take 81 mg by mouth every evening.       atorvastatin (LIPITOR) 10 MG tablet Take 1 tablet (10 mg total) by mouth at bedtime. 90 tablet 3     CARTIA  mg 24 hr capsule TAKE 1 CAPSULE BY MOUTH ONCE DAILY 90 capsule 3     hydroCHLOROthiazide (HYDRODIURIL) 12.5 MG tablet Take 1 tablet (12.5 mg total) by mouth daily. 30 tablet 5     losartan (COZAAR) 50 MG tablet Take 1 tablet (50 mg total) by mouth daily. 30 tablet 5     losartan-hydrochlorothiazide (HYZAAR) 50-12.5 mg per tablet Take 0.5 tablets by mouth daily. Takes half tablet daily 45 tablet 3     mometasone (ELOCON) 0.1 % ointment Apply thin layer QD to affected area when symptoms present, once a week for maintenance       triamcinolone (KENALOG) 0.1 % cream        No current facility-administered medications for this visit.      Allergies   Allergen Reactions     Omeprazole      Social History     Tobacco Use     Smoking status: Never Smoker     Smokeless tobacco: Never Used   Substance Use Topics     Alcohol use: Not on file     Drug use: Not on file     Patient Active Problem List   Diagnosis     Symptomatic Menopause     Hypercholesteremia     Adenomatous polyp     SVT (supraventricular tachycardia) (H)     Essential hypertension     Renal insufficiency        Reviewed, reconciled and updated       Physical Exam   General Appearance: Very pleasant, appears well.  Able to rise easily from seated to standing position and walk unimpeded around the exam room.    /66 (Patient Site: Left Arm)   Pulse 77   Temp 97.7  F (36.5  C) (Oral)   Resp 16   Ht 5' 6\" (1.676 m)   Wt 129 lb (58.5 kg)   SpO2 97%   BMI 20.82 kg/m      On examination today, I did not find any areas of tenderness.  She " is able to flex forward pretty readily.  Her patellar reflexes are intact.  Ankle reflexes are minimal but I believe present.  She does have mild weakness, graded 4/5 of right toe dorsiflexors.     Additional Information   I spent 25 minutes face time with the patient, with > 50% counseling, explaining and discussing with the patient the issues enumerated in the Assessment and Plan section of this note and answering questions. Afterwards, the patient was given a printout of the AVS, with attention to the content in the Patient Instruction section.       Bryan Way MD

## 2021-06-05 NOTE — PROGRESS NOTES
ASSESSMENT: Kim Valdes is a 84 y.o. female with past medical history significant for hypercholesterolemia, hypertension  who presents today for new patient evaluation of a one-month history of right greater than left low back pain with radiation into the right greater than left lower extremity with associated numbness, tingling, and weakness.  X-ray lumbar spine shows mild to moderate leftward curvature of the spine centered at L2 with moderate loss of disc height throughout the lumbar spine and severe facet degenerative changes mid and lower lumbar spine.  I am concerned that the patient may have lumbar spinal stenosis or lumbar foraminal stenosis.  She describes some symptoms consistent with neurogenic claudication including limited standing tolerance, but she also states that she thinks she can walk at least a mile without stopping.  On exam, she demonstrated slight weakness in the right EHL.    MIKE:20  Who 5:16    PLAN:  A shared decision making model was used.  The patient's values and choices were respected.  The following represents what was discussed and decided upon by the physician assistant and the patient.      1.  DIAGNOSTIC TESTS: I reviewed the x-ray lumbar spine.  I ordered an MRI lumbar spine for further evaluation.    2.  PHYSICAL THERAPY:  Discussed the importance of core strengthening, ROM, stretching exercises with the patient and how each of these entities is important in decreasing pain.  Explained to the patient that the purpose of physical therapy is to teach the patient a home exercise program.  These exercises need to be performed every day in order to decrease pain and prevent future occurrences of pain.  I entered a referral for the patient begin physical therapy.    3.  MEDICATIONS:    - I offered for the patient to trial gabapentin 100 mg.  She declined.  She prefers not to take pain relieving medications.   -She takes full strength aspirin as needed.    4.  INTERVENTIONS: No  interventions were ordered.  Patient may benefit from interventional pain management if she fails improve conservative treatment, depending on the results of advanced imaging studies.    5.  PATIENT EDUCATION: Patient is in agreement the above plan.  All questions were answered.    6.  FOLLOW-UP:   A nurse will call the patient with the results of the MRI lumbar spine.  At that time I will likely recommend that the patient trial physical therapy and follow back up with me in 3 to 4 weeks to monitor progress.  If she has questions or concerns in the meantime, she should not hesitate to call.      SUBJECTIVE:  Kim Valdes  Is a 84 y.o. female who presents today in consultation at the request of Dr. Way for new patient evaluation of recurrent left low back pain with radiation into the right greater than left lower extremity associated numbness, tingling, and weakness.  Patient reports the pain started about 1 month ago.  She states that around this time her  had a hernia and she was having to help him transition from seated to standing.  She thinks this aggravated her back.  She denies any previous episodes of significant back pain, although she admits she has had intermittent cramping in her legs at night for years.  She was referred to our clinic for further evaluation and treatment.    Patient complains of right greater than left low back pain.  Pain spans across the low back and a broadband distribution at the belt line.  Pain radiates into the buttocks and down the posterior thighs to the knees.  The right leg is more affected than the left.  Patient states that she has numbness and tingling in the plantar aspect of both feet.  She states that she has intermittent sensation of weakness in her legs.  Again, she states she also get cramps in her calves at night.  Patient reports that her pain is aggravated with getting out of bed in the morning, prolonged standing.  However, patient states that sometimes  when she is standing for a long period of time, the pain eventually does improve.  She also notes that she thinks she could walk least a mile without stopping.  Patient reports that pain resolves completely with sitting and lying down.  She denies any loss of bowel or bladder control.  Denies any recent fevers, chills, or sweats.    Patient has not had formal physical therapy for her back, but she states that she has been doing some  stretches for her low back on her own.  She does not go to a chiropractor.  She has never had any spine surgeries or spine injections.  She uses full-strength aspirin as needed for pain.  She takes this rarely.  She prefers not to take pain relieving medications if possible.    Current Outpatient Medications on File Prior to Visit   Medication Sig Dispense Refill     aspirin 81 MG EC tablet Take 81 mg by mouth every evening.       atorvastatin (LIPITOR) 10 MG tablet Take 1 tablet (10 mg total) by mouth at bedtime. 90 tablet 3     CARTIA  mg 24 hr capsule TAKE 1 CAPSULE BY MOUTH ONCE DAILY 90 capsule 3     hydroCHLOROthiazide (HYDRODIURIL) 12.5 MG tablet Take 1 tablet (12.5 mg total) by mouth daily. 30 tablet 5     losartan (COZAAR) 50 MG tablet Take 1 tablet (50 mg total) by mouth daily. 30 tablet 5     losartan-hydrochlorothiazide (HYZAAR) 50-12.5 mg per tablet Take 0.5 tablets by mouth daily. Takes half tablet daily 45 tablet 3     mometasone (ELOCON) 0.1 % ointment Apply thin layer QD to affected area when symptoms present, once a week for maintenance           Allergies   Allergen Reactions     Omeprazole        Past Medical History:   Diagnosis Date     Cholecystitis     Created by Conversion       Patient Active Problem List   Diagnosis     Symptomatic Menopause     Hypercholesteremia     Adenomatous polyp     SVT (supraventricular tachycardia) (H)     Essential hypertension     Renal insufficiency     Spinal stenosis of lumbar region with neurogenic claudication          Past Surgical History:   Procedure Laterality Date     APPENDECTOMY  age 13     LAPAROSCOPIC CHOLECYSTECTOMY  mid 60's     OOPHORECTOMY Left age 61       Family History   Problem Relation Age of Onset     Heart attack Father      Stroke Mother        Social history: Patient is .  She is a homemaker.  She drinks 1 glass of wine about 4 times per week.  She denies tobacco use.  She denies illicit drug use.      ROS: Positive for cough, reflux, joint pain, muscle pain.  Specifically negative for bowel/bladder dysfunction, fevers,chills, appetite changes, unexplained weight loss.   Otherwise 13 systems reviewed are negative.  Please see the patient's intake questionnaire from today for details.      OBJECTIVE:  PHYSICAL EXAMINATION:    CONSTITUTIONAL:  Vital signs as above.  No acute distress.  The patient is well nourished and well groomed.  PSYCHIATRIC:  The patient is awake, alert, oriented to person, place, time and answering questions appropriately with clear speech.    HEENT: Normocephalic, atraumatic.  Sclera clear.  Neck is supple.  SKIN:  Skin over the face, bilateral lower extremities, and posterior torso is clean, dry, intact without rashes.    GAIT:  Gait is non-antalgic.  The patient is able to heel and toe walk without significant difficulty.    STANDING EXAMINATION: Patient has a scoliotic deformity.  Lumbar flexion mildly restricted.  Lumbar extension mildly restricted.  Lumbar facet loading maneuvers do not increase pain.  However, patient reports that during facet loading maneuvers she developed a sensation of weakness in the anterior thighs bilaterally.  MUSCLE STRENGTH:  The patient has 4/5 strength right EHL, otherwise 5/5 strength for the bilateral hip flexors, knee flexors/extensors, ankle dorsiflexors/plantar flexors, left great toe extensors..  NEUROLOGICAL: 2+ patellar, and 1+ Achilles reflexes bilaterally.  Negative Babinski's bilaterally.  No ankle clonus bilaterally. Sensation  to light touch is intact in the bilateral L4, L5, and S1 dermatomes.  VASCULAR:  2/4 dorsalis pedis pulses bilaterally.  Bilateral lower extremities are warm.  There is no pitting edema of the bilateral lower extremities.  ABDOMINAL:  Soft, non-distended, non-tender throughout all quadrants.  No pulsatile mass palpated in the left lower quadrant.  LYMPH NODES:  No palpable or tender inguinal lymph nodes.  MUSCULOSKELETAL: Straight leg raise positive bilaterally.    RESULTS:    EXAM: XR LUMBAR SPINE 2 OR 3 VWS  LOCATION: [facility]  DATE/TIME: 1/29/2020 10:38 AM     INDICATION: Spinal stenosis, lumbar region with neurogenic claudication.  COMPARISON: None.     IMPRESSION:   There is no fracture. There is mild to moderate leftward curvature of the spine centered at L2. There is moderate loss of disc height at every lumbar level. There are moderate to large osteophytes at L1-L2 and L2-L3. There is severe facet   degenerative changes in the mid and lower lumbar spine.

## 2021-06-05 NOTE — TELEPHONE ENCOUNTER
Medication Question or Clarification  Who is calling: pharmacy  What medication are you calling about (include dose and sig)?: hyzaar 50-12.5mg 1/2 tab daily  Who prescribed the medication?: Seamus  What is your question/concern?: on backorder- please send to 2 new rx for separate meds  Requested Pharmacy: [facility]  Okay to leave a detailed message?: Yes

## 2021-06-05 NOTE — PROGRESS NOTES
[facility]  [place]-Pelvic Initial Evaluation        [facility] Certification Request    February 10, 2020      Patient: Kim Valdes  MR Number: [id number]  YOB: 1935  Date of Visit: 2/10/2020      Dear Sary Rosales PA-C     Thank you for this referral.   We are seeing Kim Valdes for Physical Therapy of low back pain.    Medicare and/or Medicaid requires physician review and approval of the treatment plan. Please review the plan of care and verify that you agree with the therapy plan of care by co-signing this note.      If you have any questions or concerns, please don't hesitate to call.    Sincerely,      Khushbu Nogueira, PT        Physician recommendation:     ___ Follow therapist's recommendation        ___ Modify therapy      *Physician co-signature indicates they certify the need for these services furnished within this plan and while under their care.      Patient Name: Kim Valdes  Date of evaluation: 2/10/2020   Medicare  Referral Diagnosis: Scoliosis of lumbar spine, unspecified scoliosis type  Referring provider: Sary Rosales PA-C  Visit Diagnosis:     ICD-10-CM    1. Lumbar facet arthropathy M47.816    2. Generalized muscle weakness M62.81    3. Lumbar radicular pain M54.16        Assessment:     Patient presents with low back pain which has been bothering her more over the past month or two, with radicular symptoms into bilateral LE with R>L.  She is having and MRI tomorrow.  She demonstrates pain to palpation, pain with trunk motion, decrease strength.  Functional limitations are described as occurring with: getting out of bed, bending, lifting, squatting.  Pt. is appropriate and a good candidate for skilled PT intervention as outlined in the Plan of Care (POC).      Goals:  Pt. will be independent with home exercise program in : 6 weeks    Pt will: Able to bend down to put shoes and socks on within 8-12 visits  Pt will: Able to  decrease pain to 0-2/10 as she gets out of bed in the morning within 8-12 visits  Pt will: Able to lift objects > 10# such as laundry within 8-12 visits  Pt will: Able to squat and get back up with more ease as leg strength improves within 8-12 visits    Patient's expectations/goals are realistic.    Barriers to Learning or Achieving Goals:  Patient Active Problem List   Diagnosis     Symptomatic Menopause     Hypercholesteremia     Adenomatous polyp     SVT (supraventricular tachycardia) (H)     Essential hypertension     Renal insufficiency     Spinal stenosis of lumbar region with neurogenic claudication       POC, goals and pathology of condition were reviewed with the patient.  Patient is in agreement with the POC.       Plan / Patient Instructions:        Plan of Care:   Authorization / Certification Start Date: 02/10/20  Authorization / Certification End Date: 05/08/20  Authorization / Certification Number of Visits: 8-12  Communication with: Referral Source  Patient Related Instruction: Nature of Condition;Treatment plan and rationale;Self Care instruction;Basis of treatment;Body mechanics;Posture  Times per Week: 1-2  Number of Weeks: 8-12  Number of Visits: 8-12  Therapeutic Exercise: ROM;Stretching;Strengthening  Neuromuscular Reeducation: kinesio tape;posture;core  Manual Therapy: soft tissue mobilization;myofascial release;joint mobilization  Modalities: electrical stimulation;TENS      Plan for next visit:   Pt leaving on Wednesday to go to [place] for 9 days    Review stretches  ? Modalities for pain control  edu for posture and body mechanics  Pt's , Lon was present     Subjective:           History of Present Illness:    Kim is a 85 y.o. female who presents to therapy today with complaints of back pain which she has had over 1 month.  She has bilateral pain with R>L and numbness in leg and tingling into foot in LE with R>L.   She noticed more of the pain when she had to help with  transfers with her  who developed a hernia.  She has had pain on and off for years but nothing this intense.  She had an X-ray which showed a scoliosis and other arthritic type changes.  Symptoms are constant and not improving. She is having an MRI tomorrow.    Pain Ratin  Pain rating at best: 2  Pain rating at worst: 6  Pain description: numbness, sharp, tingling and weakness    Functional limitations are described as occurring with:   getting out of bed, bending, lifting, squatting    Patient reports benefit from:  sitting or laying down       Objective:      Note: Items left blank indicates the item was not performed or not indicated at the time of the evaluation.    Patient Outcome Measures :    Modified Oswestry Low Back Pain Disablity Questionnaire  in %: 18     Scores range from 0-100%, where a score of 0% represents minimal pain and maximal function. The minimal clinically important difference is a score reduction of 12%.    Examination  1. Lumbar facet arthropathy     2. Generalized muscle weakness     3. Lumbar radicular pain       Involved side: Bilateral R>L  Posture Observation:      General standing posture is moderate+ thoracic kyphosis and forward head, left shoulder complex lower, .    Lumbar ROM:         Within Normal Limits unless noted  Date: 02/10/20     *Indicate scale AROM AROM AROM   Lumbar Flexion Hands to knees, pull on right     Lumbar Extension Pull on left      Right Left Right Left Right Left   Lumbar Sidebending pain pain       Lumbar Rotation         Thoracic Flexion      Thoracic Extension      Thoracic Sidebending         Thoracic Rotation           Lower Extremity Strength:     Date: 02/10/20     LE strength/5 Right Left Right Left Right Left   Hip Flexion (L1-3) 5 5       Hip Extension (L5-S1) 4+ 4+       Hip Abduction (L4-5) 5- 5-       Hip Adduction (L2-3) 4+ 4+       Hip External Rotation         Hip Internal Rotation         Knee Extension (L3-4) 5- 5-       Knee  Flexion 5- 5-       Ankle Dorsiflexion (L4-5)         Great Toe Extension (L5)         Ankle Plantar flexion (S1)         Abdominals        Sensation      Intact to light touch     Reflex Testing  Lumbar Dermatomes Right Left UE Reflexes Right Left   Iliac Crest and Groin (L1)   Biceps (C5-6)     Anterior Medial Thigh (L2)   Brachioradialis (C5-6)     Anterior Thigh, Medial Epicondyle Femur (L3)   Triceps (C7-8)     Lateral Thigh, Anterior Knee, Medial Leg/Malleolus (L4)   Delaney s test     Lateral Leg, Dorsal Foot (L5)   LE Reflexes     Lateral Foot (S1)   Patellar (L3-4)     Posterior Leg (S2)   Achilles (S1-2)     Other:   Babinski Response - -     Palpation: pain right > left lumbar paraspinals, SI joint, piriformis, gluteus medius, left QL  1 Leg Stance: 1-2 seconds bilaterally  Trendelenberg: + bilaterally  Squat: knees adduct  Able to toe and heel walk  Bridging: weakness demonstrated with quality of task    Lumbar Special Tests:     Lumbar Special Tests Right Left SI Tests Right  Left   Quadrant test   SI Compression - -   Straight leg raise - - SI Distraction     Crossover response   POSH Test     Slump - - Sacral Thrust     Sit-up test  FADIR     Trunk extensor endurance test  Resisted Abduction     Prone instability test  Other:     Pubic shotgun  Other:         LE Screen:  hamstrings fair for flexibility, moderately decreased left hip IR     Exercises:  Exercise #1: stretches: sitting hamstring, standing gastroc, supine piriformis, SKC, LTR  Comment #1:    hold 30 seconds x 1-3 reps  Exercise #2: tried LE supine nerve glides and supine hip ER but pt did not feel anything  or a stretch so did not issue    Treatment Today     TREATMENT MINUTES COMMENTS   Evaluation 30 lumbar   Self-care/ Home management     Manual therapy     Neuromuscular Re-education     Therapeutic Activity     Therapeutic Exercises 25 See above or flow sheet   Gait training     Modality__________________                Total 55     Blank areas are intentional and mean the treatment did not include these items.       PT Evaluation Code: (Please list factors)  Patient History/Comorbidities:   Patient Active Problem List   Diagnosis     Symptomatic Menopause     Hypercholesteremia     Adenomatous polyp     SVT (supraventricular tachycardia) (H)     Essential hypertension     Renal insufficiency     Spinal stenosis of lumbar region with neurogenic claudication     Examination: pain to palpation, decrease strength  Clinical Presentation: stable  Clinical Decision Making: low    Patient History/  Comorbidities Examination  (body structures and functions, activity limitations, and/or participation restrictions) Clinical Presentation Clinical Decision Making (Complexity)   No documented Comorbidities or personal factors 1-2 Elements Stable and/or uncomplicated Low   1-2 documented comorbidities or personal factor 3 Elements Evolving clinical presentation with changing characteristics Moderate   3-4 documented comorbidities or personal factors 4 or more Unstable and unpredictable High              Khushbu Nogueira, PT  2/10/2020  7:44 AM

## 2021-06-06 NOTE — TELEPHONE ENCOUNTER
"Phone call to patient to review results and provider's recommendations. Call to patient's home number \"Not accepting calls at this number at this time. Please try back later.\" Call placed to cell number which is her daughter's number; left message to call back.   "

## 2021-06-06 NOTE — TELEPHONE ENCOUNTER
----- Message from Sary Rosales PA-C sent at 2/26/2020  9:22 AM CST -----  Please call the patient let her know that I reviewed her flexion-extension x-rays.  This shows only 1 to 2 mm of movement at L3-4.  No significant instability.  Neurosurgery can review this result with her at her consultation.

## 2021-06-06 NOTE — TELEPHONE ENCOUNTER
----- Message from Sary Rosales PA-C sent at 2/11/2020  3:33 PM CST -----  Please call the patient let her know that I reviewed her MRI lumbar spine.  This shows a disc bulge and advanced arthritis in the joints causing severe spinal stenosis.  Based on the severity of the stenosis, I recommend she have an evaluation with neurosurgery.  Patient has had flexion-extension x-rays before her neurosurgical consultation.  Please a me know once you have spoken with the patient and I will enter the neurosurgical referral and to the order for the x-rays.

## 2021-06-06 NOTE — PROGRESS NOTES
[facility] Daily Progress     Patient Name: Kim Valdes  Date: 3/6/2020  Visit #: 4/8-12  POC Dates:  2/10/20-5/8/20, Medicare, [facility]  Referral Diagnosis: Scoliosis of lumbar spine, unspecified scoliosis type  Referring provider: Sary Rosales PA-C  Visit Diagnosis:     ICD-10-CM    1. Lumbar facet arthropathy M47.816    2. Generalized muscle weakness M62.81    3. Lumbar radicular pain M54.16          Assessment:     She felt the e-stim was helpful and maybe helped some of the symptoms.  May try TENS unit for pain control if she feels she can put the pads on herself.  She was a little confused on the exercises so may require a few visits of review.  Patient is benefitting from skilled physical therapy and is making steady progress toward functional goals.  Patient is appropriate to continue with skilled physical therapy intervention, as indicated by initial plan of care.    Goal Status: slow progress.  Pt. will be independent with home exercise program in : 6 weeks    Pt will: Able to bend down to put shoes and socks on within 8-12 visits  Pt will: Able to decrease pain to 0-2/10 as she gets out of bed in the morning within 8-12 visits  Pt will: Able to lift objects > 10# such as laundry within 8-12 visits  Pt will: Able to squat and get back up with more ease as leg strength improves within 8-12 visits      Plan / Patient Education:     estim if helpful, ? Trial of TENS  ?KT  edu for posture and body mechanics  Keep HEP simple and limited to # of ex    Pt's , Lon was present  Subjective:     Pain Rating: 3    I saw the neurosurgeon and I need to get another image.  I think it is my neck  I am going to get an injection for my low back to help with the pain  The outside of my legs-shins hurt today.  I get a couple days of pain relief from the estim      Objective:     Patient Active Problem List   Diagnosis     Symptomatic Menopause     Hypercholesteremia     Adenomatous polyp      SVT (supraventricular tachycardia) (H)     Essential hypertension     Renal insufficiency     Spinal stenosis of lumbar region with neurogenic claudication     Exercises:  Exercise #1: stretches: sitting hamstring, standing gastroc, supine piriformis, SKC, LTR  Comment #1:    hold 30 seconds x 1-3 reps  Exercise #2: tried LE supine nerve glides and supine hip ER but pt did not feel anything  or a stretch so did not issue  Comment #2: posterior pelvic tilt in supine and sitting    hold 5-10 seconds X 5-10 reps  Exercise #3: clamshells  X 15  Comment #3: bridges  hold 10 seconds,  X 6      Treatment Today     TREATMENT MINUTES COMMENTS   Evaluation     Self-care/ Home management     Manual therapy     Neuromuscular Re-education     Therapeutic Activity     Therapeutic Exercises 12 See flow sheet or above     Gait training     Modality__________________     Electrical stimulation 10+5 set up Pt supine with bolster under knees;  4 pads, bilateral low back, IFC, sweep         Total 27    Blank areas are intentional and mean the treatment did not include these items.       Khushbu Nogueria, PT  3/6/2020

## 2021-06-06 NOTE — PROGRESS NOTES
[facility] Rehabilitation Daily Progress     Patient Name: Kim Valdes  Date: 3/12/2020  Visit #: 5/8-12  POC Dates:  2/10/20-5/8/20, Medicare, [facility]  Referral Diagnosis: Scoliosis of lumbar spine, unspecified scoliosis type  Referring provider: Sary Rosales PA-C  Visit Diagnosis:     ICD-10-CM    1. Lumbar facet arthropathy  M47.816    2. Generalized muscle weakness  M62.81    3. Lumbar radicular pain  M54.16          Assessment:   Patient felt the TENS was helpful as she wore it through the session while she was doing her exercises.  She will think on this as an option for pain control  She is very interested in the trying the KT so may do that next visit.    Patient is benefitting from skilled physical therapy and is making steady progress toward functional goals.  Patient is appropriate to continue with skilled physical therapy intervention, as indicated by initial plan of care.    Goal Status: slow progress.  Pt. will be independent with home exercise program in : 6 weeks    Pt will: Able to bend down to put shoes and socks on within 8-12 visits  Pt will: Able to decrease pain to 0-2/10 as she gets out of bed in the morning within 8-12 visits  Pt will: Able to lift objects > 10# such as laundry within 8-12 visits  Pt will: Able to squat and get back up with more ease as leg strength improves within 8-12 visits   Bending is still a challenge but not every time.  Worse in AM  Getting out of bed is a little easier on some days.  Other days she is still really stiff      Plan / Patient Education:     Trial of TENS  ?KT-pt is interested  edu for posture and body mechanics prn  Keep HEP simple and limited to # of ex    Pt's , Lon was present  Subjective:     Pain Rating: 3    I saw the neurosurgeon and I need to get another image.  I think it is my neck  I am going to get an injection for my low back to help with the pain  The outside of my legs-shins hurt today.  I get a couple days of pain  relief from the estim      Objective:     Patient Active Problem List   Diagnosis     Symptomatic Menopause     Hypercholesteremia     Adenomatous polyp     SVT (supraventricular tachycardia) (H)     Essential hypertension     Renal insufficiency     Spinal stenosis of lumbar region with neurogenic claudication     Exercises:  Exercise #1: stretches: sitting hamstring, standing gastroc, supine piriformis, SKC, LTR  Comment #1:    hold 30 seconds x 1-3 reps  Exercise #2: tried LE supine nerve glides and supine hip ER but pt did not feel anything  or a stretch so did not issue  Comment #2: posterior pelvic tilt in supine and sitting    hold 5-10 seconds X 5-10 reps  Exercise #3: clamshells  X 15  Comment #3: bridges  hold 10 seconds,  X 6  Exercise #4: sit<>stand  3-5 reps  3-5 chairs      Treatment Today     TREATMENT MINUTES COMMENTS   Evaluation     Self-care/ Home management     Manual therapy     Neuromuscular Re-education     Therapeutic Activity     Therapeutic Exercises 15 See flow sheet or above     Gait training     Modality__________________     TENS 10+5 set up, explanation, precautions 4 pads, bilateral low back,TENS worn throughout the session         Total 30    Blank areas are intentional and mean the treatment did not include these items.       Khushbu Nogueira, PT  3/12/2020

## 2021-06-06 NOTE — PROGRESS NOTES
[facility] Rehabilitation Daily Progress     Patient Name: Kim Valdes  Date: 3/16/2020  Visit #: 6/8-12  POC Dates:  2/10/20-5/8/20, Medicare, [facility]  Referral Diagnosis: Scoliosis of lumbar spine, unspecified scoliosis type  Referring provider: Sary Rosales PA-C  Visit Diagnosis:     ICD-10-CM    1. Lumbar facet arthropathy  M47.816    2. Generalized muscle weakness  M62.81    3. Lumbar radicular pain  M54.16          Assessment:   Patient really liked the TENS and think it will be beneficial but she really wanted to try the KT as well.  Will try the TENS again in a session or two to see if it is something she would like to get.  KT was applied and explained and pt thought it felt good on her back.  She does have thinner skin so unsure if this will be a good option to use frequently.    She was challenged with the exercise for low abs added today so will review next visit.  An instruction sheet was given to pt to follow at home.  Patient is benefitting from skilled physical therapy and is making steady progress toward functional goals.  Patient is appropriate to continue with skilled physical therapy intervention, as indicated by initial plan of care.    Goal Status: slow progress.  Pt. will be independent with home exercise program in : 6 weeks    Pt will: Able to bend down to put shoes and socks on within 8-12 visits  Pt will: Able to decrease pain to 0-2/10 as she gets out of bed in the morning within 8-12 visits  Pt will: Able to lift objects > 10# such as laundry within 8-12 visits  Pt will: Able to squat and get back up with more ease as leg strength improves within 8-12 visits   Bending is still a challenge but not every time.  Worse in AM  Getting out of bed is a little easier on some days.  Other days she is still really stiff      Plan / Patient Education:     Pt wants to decrease to 1X/week due to Coronavirus.    Trial of TENS  ?KT-pt is interested  edu for posture and body mechanics  "prn  Keep HEP simple and limited to # of ex    Pt's , Lon was present  Subjective:     Pain Ratin                      Was a 4-5 when she woke this AM    I am sore when I first wake but as the morning progresses, it has felt better  I decided not to get an injection.  It is too risky.  I do like the TENS but I really want to try the KT today.    Objective:     Patient Active Problem List   Diagnosis     Symptomatic Menopause     Hypercholesteremia     Adenomatous polyp     SVT (supraventricular tachycardia) (H)     Essential hypertension     Renal insufficiency     Spinal stenosis of lumbar region with neurogenic claudication     Exercises:  Exercise #1: stretches: sitting hamstring, standing gastroc, supine piriformis, SKC, LTR  Comment #1:    hold 30 seconds x 1-3 reps  Exercise #2: tried LE supine nerve glides and supine hip ER but pt did not feel anything  or a stretch so did not issue  Comment #2: posterior pelvic tilt in supine and sitting    hold 5-10 seconds X 5-10 reps  Exercise #3: clamshells  X 15  Comment #3: bridges  hold 10 seconds,  X 6  Exercise #4: sit<>stand  3-5 reps  3-5 chairs  Comment #4: low abdominals:  supine, hip/knee 90 degrees, X 20      Treatment Today     TREATMENT MINUTES COMMENTS   Evaluation     Self-care/ Home management     Manual therapy     Neuromuscular Re-education 8+3 explanation, precautions KT, 2\", 4 blocks, paper off tension, bilateral low lumbar, distal to proximal   Therapeutic Activity     Therapeutic Exercises 15 See flow sheet or above     Gait training     Modality__________________     TENS Not today 4 pads, bilateral low back,TENS worn throughout the session         Total 26    Blank areas are intentional and mean the treatment did not include these items.       Khushbu Nogueira, PT  3/16/2020    "

## 2021-06-06 NOTE — PROGRESS NOTES
[facility] Rehabilitation Daily Progress     Patient Name: Kim Valdes  Date: 2020  Visit #: -  POC Dates:  2/10/20-20, Medicare, [facility]  Referral Diagnosis: Scoliosis of lumbar spine, unspecified scoliosis type  Referring provider: Sary Rosales PA-C  Visit Diagnosis:     ICD-10-CM    1. Lumbar facet arthropathy M47.816    2. Generalized muscle weakness M62.81    3. Lumbar radicular pain M54.16          Assessment:   Patient wanted to review the exercises and she demonstrated good technique with all.  She felt more comfortable that she was doing them the right way.  She felt the e-stim was helpful and maybe helped some of the symptoms.  She will be mindful the rest of the day on how her symptoms change.    Patient is benefitting from skilled physical therapy and is making steady progress toward functional goals.  Patient is appropriate to continue with skilled physical therapy intervention, as indicated by initial plan of care.    Goal Status:  Pt. will be independent with home exercise program in : 6 weeks    Pt will: Able to bend down to put shoes and socks on within 8-12 visits  Pt will: Able to decrease pain to 0-2/10 as she gets out of bed in the morning within 8-12 visits  Pt will: Able to lift objects > 10# such as laundry within 8-12 visits  Pt will: Able to squat and get back up with more ease as leg strength improves within 8-12 visits      Plan / Patient Education:     estim if helpful  ?KT  [facility] for posture and body mechanics      Pt's , Lon was present  Subjective:     Pain Ratin    We went to [place] and I did great on the plane.  Sitting is better for me.  Today my right foot has been tingling much more than usual.    Standing is hard for me.  I get a flexion/extension X-ray tomorrow.      Objective:     Patient Active Problem List   Diagnosis     Symptomatic Menopause     Hypercholesteremia     Adenomatous polyp     SVT (supraventricular tachycardia) (H)      Essential hypertension     Renal insufficiency     Spinal stenosis of lumbar region with neurogenic claudication     Exercises:  Exercise #1: stretches: sitting hamstring, standing gastroc, supine piriformis, SKC, LTR  Comment #1:    hold 30 seconds x 1-3 reps  Exercise #2: tried LE supine nerve glides and supine hip ER but pt did not feel anything  or a stretch so did not issue      Treatment Today     TREATMENT MINUTES COMMENTS   Evaluation     Self-care/ Home management     Manual therapy     Neuromuscular Re-education     Therapeutic Activity     Therapeutic Exercises 12 See flow sheet or above   Gait training     Modality__________________     Electrical stimulation 10+5 Pt supine with bolster under knees;  4 pads, bilateral low back, IFC, sweep         Total 27    Blank areas are intentional and mean the treatment did not include these items.       Khushbu Nogueira, PT  2/24/2020

## 2021-06-06 NOTE — TELEPHONE ENCOUNTER
Call to pt with provider's results and recommendations. Pt stated understanding. Pt notes she is leaving for [place] tomorrow AM until 2/21/2020.     Advised her orders for neurosurgery consult and x-rays will be placed. She is away x-rays should be completed prior to her neurosurgery appt. Contact information provided for radiology, neurosurgery and nurse navigation line.

## 2021-06-06 NOTE — PROGRESS NOTES
[facility] Rehabilitation Daily Progress     Patient Name: Kim Valdes  Date: 3/2/2020  Visit #: 3/8-12  POC Dates:  2/10/20-5/8/20, Medicare, [facility]  Referral Diagnosis: Scoliosis of lumbar spine, unspecified scoliosis type  Referring provider: Sary Rosales PA-C  Visit Diagnosis:     ICD-10-CM    1. Lumbar facet arthropathy M47.816    2. Generalized muscle weakness M62.81    3. Lumbar radicular pain M54.16          Assessment:   Patient wanted to review the exercises 1 more time to be sure she was doing them correct and she did demonstrate good technique.  The pelvic tilts were not painful so will use to help the blood flow and stretch the muscles in the pelvic region.  She felt the e-stim was helpful and maybe helped some of the symptoms.    Patient is benefitting from skilled physical therapy and is making steady progress toward functional goals.  Patient is appropriate to continue with skilled physical therapy intervention, as indicated by initial plan of care.    Goal Status:  Pt. will be independent with home exercise program in : 6 weeks    Pt will: Able to bend down to put shoes and socks on within 8-12 visits  Pt will: Able to decrease pain to 0-2/10 as she gets out of bed in the morning within 8-12 visits  Pt will: Able to lift objects > 10# such as laundry within 8-12 visits  Pt will: Able to squat and get back up with more ease as leg strength improves within 8-12 visits      Plan / Patient Education:     estim if helpful  ?KT  [facility] for posture and body mechanics      Pt's , Lon was present  Subjective:     Pain Rating: 3    Yesterday was  Bad but today is a little better.  The heat helped.  Think the estim has been helpful for the back but not the tingling.  Today my right foot continues to tingle and it really bothers me  I am going to meet with the neurosurgeon on 2/5/20.  Sit<>stand can really be hard      Objective:     Patient Active Problem List   Diagnosis     Symptomatic  Menopause     Hypercholesteremia     Adenomatous polyp     SVT (supraventricular tachycardia) (H)     Essential hypertension     Renal insufficiency     Spinal stenosis of lumbar region with neurogenic claudication     Exercises:  Exercise #1: stretches: sitting hamstring, standing gastroc, supine piriformis, SKC, LTR  Comment #1:    hold 30 seconds x 1-3 reps  Exercise #2: tried LE supine nerve glides and supine hip ER but pt did not feel anything  or a stretch so did not issue  Comment #2: posterior pelvic tilt in supine and sitting    hold 5-10 seconds X 5-10 reps      Treatment Today     TREATMENT MINUTES COMMENTS   Evaluation     Self-care/ Home management     Manual therapy     Neuromuscular Re-education     Therapeutic Activity     Therapeutic Exercises 12 See flow sheet or above  Review of all exercises, added pelvic tilts   Gait training     Modality__________________     Electrical stimulation 10+5 Pt supine with bolster under knees;  4 pads, bilateral low back, IFC, sweep         Total 27    Blank areas are intentional and mean the treatment did not include these items.       Khushbu Nogueira, PT  3/2/2020

## 2021-06-06 NOTE — PROGRESS NOTES
NEUROSURGERY CONSULTATION NOTE    3/5/2020     Kim Valdes is a 85 y.o. female who is sent to us in consultation by Sary Rosales for evaluation of lumbar stenosis.         CONSULTATION ASSESSMENT AND PLAN:     86 yo female who presents with low back and leg pain.  We will also have tingling in the right foot only.  MRI shows multilevel degenerative disc disease with severe spinal canal stenosis and bilateral recess narrowing at the lumbar 4-lumbar 5 level appears most responsible for her symptoms.  Lumbar  dynamic x-rays were negative for significant instability.  Darin treatment options including lumbar decompression versus conservative management.  She would like to proceed with a lumbar 4-lumbar 5 epidural prior to considering surgical options.  Also has hyperreflexia on exam and slight imbalance with tandem gait cervical spine to rule out any stenosis.  Follow-up in 4 weeks      I spent more than 45 minutes in this apt, examining the pt, reviewing the scans, reviewing notes from chart, discussing treatment options with risks and benefits and coordinating care. >50 % clinic time was spent in face to face counseling and coordinating care    Michelle Mathew     HPI:  86 yo female who presents with low back and leg pain. Started 3 months ago. Pain travels in the posterior legs R>L in the posterior legs to the calves. Will also have whole foot tingling on the right only. Symptoms begin when stands and most of the time with walking. No pain with laying down. Will have pain as soon as tries to stand.  No leg weakness, bowel or bladder dysfunction, saddle anesthesia.     2 sessions of PT. E-stim provide some relief. No injections to date. On asa daily with some relief of her pain.       Prior Spine Surgery:no    Past Medical History:   Diagnosis Date     Cholecystitis     Created by Conversion      Past Surgical History:   Procedure Laterality Date     APPENDECTOMY  age 13     LAPAROSCOPIC CHOLECYSTECTOMY   mid 60's     OOPHORECTOMY Left age 61         REVIEW OF SYSTEMS:  ROS reviewed with pt as documented on pt health form of 3/5/2020.    No family hx of anesthetic reactions.  No family hx of hypercoagulability.       MEDICATIONS:  Current Outpatient Medications   Medication Sig Dispense Refill     aspirin 81 MG EC tablet Take 81 mg by mouth every evening.       atorvastatin (LIPITOR) 10 MG tablet Take 1 tablet (10 mg total) by mouth at bedtime. 90 tablet 3     CARTIA  mg 24 hr capsule TAKE 1 CAPSULE BY MOUTH ONCE DAILY 90 capsule 3     losartan-hydrochlorothiazide (HYZAAR) 50-12.5 mg per tablet Take 0.5 tablets by mouth daily. Takes half tablet daily 45 tablet 3     mometasone (ELOCON) 0.1 % ointment Apply thin layer QD to affected area when symptoms present, once a week for maintenance       No current facility-administered medications for this visit.          ALLERGIES/SENSITIVITIES:     Allergies   Allergen Reactions     Omeprazole        PERTINENT SOCIAL HISTORY:   Social History     Socioeconomic History     Marital status:      Spouse name: None     Number of children: None     Years of education: None     Highest education level: None   Occupational History     None   Social Needs     Financial resource strain: None     Food insecurity:     Worry: None     Inability: None     Transportation needs:     Medical: None     Non-medical: None   Tobacco Use     Smoking status: Never Smoker     Smokeless tobacco: Never Used   Substance and Sexual Activity     Alcohol use: None     Drug use: None     Sexual activity: None   Lifestyle     Physical activity:     Days per week: None     Minutes per session: None     Stress: None   Relationships     Social connections:     Talks on phone: None     Gets together: None     Attends Islam service: None     Active member of club or organization: None     Attends meetings of clubs or organizations: None     Relationship status: None     Intimate partner  "violence:     Fear of current or ex partner: None     Emotionally abused: None     Physically abused: None     Forced sexual activity: None   Other Topics Concern     None   Social History Narrative     None         FAMILY HISTORY:  Family History   Problem Relation Age of Onset     Heart attack Father      Stroke Mother         PHYSICAL EXAM:   Constitutional: /74   Pulse 82   Ht 5' 5.25\" (1.657 m)   Wt 131 lb (59.4 kg)   SpO2 100%   BMI 21.63 kg/m       Mental Status: A & O in no acute distress.  Affect is appropriate.  Speech is fluent.  Recent and remote memory are intact.  Attention span and concentration are normal.     Cranial Nerves: CN1: grossly intact per patient recall. CN2: No funduscopic exam performed. CN3,4 & 6: Pupillary light response, lateral and vertical gaze normal.  No nystagmus.  Visual fields are full to confrontation. CN5: Intact to touch CN7: No facial weakness, smile, facial symmetry intact. CN8: Intact to spoken voice. CN9&10: Gag reflex, uvula midline, palate rises with phonation. CN11: Shoulder shrug 5/5 intact bilaterally. CN12: Tongue midline and moves freely from side to side.     Motor: No pronator drift of upper extremity. Normal bulk and tone all muscle groups of upper and lower extremities.    Sensory: Sensation intact bilaterally to light touch.      Coordination:  Heel/toe/  gait intact- slight weakness with heel walking on left.  slight imbalance tandem gait      Reflexes; supinator, biceps, triceps, knee/ ankle jerk intact- brisk x 4. no hoffmans/ no    babinski/ clonus.    IMAGING: I personally reviewed all radiographic images      Cc:   Bryan Way MD  [address] [place] [address]  "

## 2021-06-06 NOTE — PROGRESS NOTES
Neurosurgery consultation was requested by: Sary Rosales PA-C  Pain:  right greater than left low back pain  Radicular Pain is present:  into the right greater than left lower extremity   Lhermitte sign: No  Motor complaints: weakness in right  Sensory complaints: numbness, tingling, and weakness  Gait and balance issues: No  Bowel or bladder issues: No  Duration of SX is: 2 mo  The symptoms are worse with: standing for a long period  The symptoms are better with: heat and PT  Injury: denies  Severity is: moderate  Patient has tried the following conservative measures: PT  MIKE score is: 16  Luisa Lux CMA

## 2021-06-06 NOTE — TELEPHONE ENCOUNTER
Patient returned call. Results given and explained. Did explain that these results as well as next best steps for her would be discussed with the neurosurgeon from [facility]. Contact information provided. Transferred to scheduling to make this appointment.

## 2021-06-06 NOTE — TELEPHONE ENCOUNTER
Patient had returned call and left message on nurse navigation line at 1032. Returned her call, but got voicemail again. Left message to return call.

## 2021-06-07 NOTE — PATIENT INSTRUCTIONS - HE
Overall stable, maintaining her mobility, medication regimen reviewed and looks very reasonable.  I suggested that we meet in the clinic face-to-face in August or September 2020.    Advanced lumbar spondylosis and central canal stenosis with Neurogenic claudication and right lower extremity radiculopathic symptoms with subtle findings of distal muscle weakness; continuing with conservative management strategy.    She describes pain in both buttocks, going into both lower extremities, right worse than left.  She is able to sleep comfortably, but the pain and stiffness are worse when she first gets out of bed.  They get a little bit better once she has become more active over the course of the morning, but she will have pain if she stands in place.  Sitting down gives her relief.    EXAM: MR LUMBAR SPINE WO CONTRAST  LOCATION: [facility]  DATE/TIME: 2/11/2020 2:24 PM     IMPRESSION:   1.  Multilevel degenerative disc disease of the lumbar spine with symmetric disc bulges, most pronounced at the L4/L5 level where there is severe spinal canal and bilateral lateral recess narrowing (AP diameter the spinal canal measuring 3 mm).  2.  Advanced bilateral facet arthropathy and bilateral facet joint effusions at L4/L5. Flexion and extension views could be performed to evaluate for dynamic instability.  3.  Multilevel posterolateral disc disease and facet arthropathy with multilevel neural foraminal narrowing, most pronounced at the L4/L5 level where there is moderate to severe right and moderate left neural foraminal narrowing.    I told her that as long as she is able to maintain good mobility, without falling, pain is not disabling, and not having problems with bowel and bladder--as long as all that is true, then she can continue with a conservative management strategy of exercise and maximizing mobility.    When she met with the spine clinic on January 31, 2020, she declined trial of gabapentin.    Neurosurgery  consultation March 5, 2020, where surgical options were presented to her.  Also discussion about epidural steroid injection, which she has declined thus far.    Follow-up telephone visit with neurosurgery Dr. Mathew March 19, 2020, plan to continue with conservative management.    Cervical spondylosis, with central canal stenosis at C6-C7 level, with mild compression of the cervical spinal cord, but no evidence of myelomalacia by MRI March 5, 2020    EXAM: MR CERVICAL SPINE WO CONTRAST  LOCATION: [facility]  DATE/TIME: 3/17/2020 1:46 PM  1.  Multilevel degenerative disc disease of the cervical spine with disc osteophyte complexes, most pronounced at the C5/C6 level where there is moderate to severe spinal canal narrowing and compression of the cervical spinal cord and at the C6/C7 level   where there is moderate spinal canal narrowing and mild compression of the cervical spinal cord. No definite evidence of edema or myelomalacia at these levels.  2.  Multilevel uncovertebral joint disease and facet arthropathy with severe multilevel neural foraminal narrowing at C3/C4-C6/C7 as described.     Hypertension with elevated systolic component, which I believe is probably from arterial compliance, I think is on a reasonable blood pressure regime; BUN and creatinine normal when measured on October 23, 2019.  Blood pressure is pretty good according to her home machine.     Current medications are losartan hydrochlorothiazide as a fixed dose combination pill and also she takes Cartia XT for supraventricular tachycardia suppression, but that also has blood pressure lowering effects.     Her metabolic panel checked on October 23, 2019 was satisfactory with normal potassium, BUN, and creatinine of 0.93, which is now normalized compared to previous readings of about 1.23.    History of benign monoclonal gammopathy.    Serum protein electrophoresis from specimen drawn October 23, 2019 showed no monoclonal protein.  I  think we can stop checking.     Hyperlipidemia on treatment.  Lipid profile looks great when checked August 29.  Continue on atorvastatin 10 milligrams a day.     Supraventricular tachycardia on Cartia XT for the last 2 years.  She only gets occasional brief fluttering episodes, nothing sustained.     Menopause with history of previous left oophrectomy.  Uterus is still in place.     History of adenomatous colon polyp.  Last colonoscopy was October 2015, and she has stopped colon screening.     Vulvar lichen sclerosus, for which she uses topical steroid ointment with good effect.     I told her to get her tetanus booster at a community pharmacy under her Medicare drug benefit.  She is already received her seasonal influenza vaccine.    She has received Prevnar 13 and pneumococcal 23 polysaccharide already.

## 2021-06-07 NOTE — PROGRESS NOTES
"Kim Valdes is a 85 y.o. female who is being evaluated via a billable telephone visit.      The patient has been notified of following:     \"This telephone visit will be conducted via a call between you and your physician/provider. We have found that certain health care needs can be provided without the need for a physical exam.  This service lets us provide the care you need with a short phone conversation.  If a prescription is necessary we can send it directly to your pharmacy.  If lab work is needed we can place an order for that and you can then stop by our lab to have the test done at a later time.    Telephone visits are billed at different rates depending on your insurance coverage. During this emergency period, for some insurers they may be billed the same as an in-person visit.  Please reach out to your insurance provider with any questions.    If during the course of the call the physician/provider feels a telephone visit is not appropriate, you will not be charged for this service.\"    Patient has given verbal consent to a Telephone visit? Yes    Patient would like to receive their AVS by AVS Preference: Mail a copy.    Follow-up for multiple medical issues, most notably lumbar spinal stenosis with neurogenic claudication and lumbar radiculopathy symptoms.    She told me that her physical therapist suggested considering an epidural steroid injection.  I told Ms. Valdes that an injection would perhaps give her a couple of weeks of symptomatic benefit, but really the most important thing is to continue with the exercises as instructed by her physical therapist.    She told me that her symptoms are overall stable, and that she actually feels better later in the day, when she loosens up.  She is not had problems with falling.  She wants to continue with conservative management, not interested in surgery.    She told me that she is going for daily walks with her  around her neighborhood.    She " reported today's /71 on Home machine  Pulse 64    Last visit with me was January 29, 2020    Assessment/Plan:    Overall stable, maintaining her mobility, medication regimen reviewed and looks very reasonable.  I suggested that we meet in the clinic face-to-face in August or September 2020.    Advanced lumbar spondylosis and central canal stenosis with Neurogenic claudication and right lower extremity radiculopathic symptoms with subtle findings of distal muscle weakness; continuing with conservative management strategy.    She describes pain in both buttocks, going into both lower extremities, right worse than left.  She is able to sleep comfortably, but the pain and stiffness are worse when she first gets out of bed.  They get a little bit better once she has become more active over the course of the morning, but she will have pain if she stands in place.  Sitting down gives her relief.    EXAM: MR LUMBAR SPINE WO CONTRAST  LOCATION: [facility]  DATE/TIME: 2/11/2020 2:24 PM     IMPRESSION:   1.  Multilevel degenerative disc disease of the lumbar spine with symmetric disc bulges, most pronounced at the L4/L5 level where there is severe spinal canal and bilateral lateral recess narrowing (AP diameter the spinal canal measuring 3 mm).  2.  Advanced bilateral facet arthropathy and bilateral facet joint effusions at L4/L5. Flexion and extension views could be performed to evaluate for dynamic instability.  3.  Multilevel posterolateral disc disease and facet arthropathy with multilevel neural foraminal narrowing, most pronounced at the L4/L5 level where there is moderate to severe right and moderate left neural foraminal narrowing.    I told her that as long as she is able to maintain good mobility, without falling, pain is not disabling, and not having problems with bowel and bladder--as long as all that is true, then she can continue with a conservative management strategy of exercise and maximizing  mobility.    When she met with the spine clinic on January 31, 2020, she declined trial of gabapentin.    Neurosurgery consultation March 5, 2020, where surgical options were presented to her.  Also discussion about epidural steroid injection, which she has declined thus far.    Follow-up telephone visit with neurosurgery Dr. Mathew March 19, 2020, plan to continue with conservative management.    Cervical spondylosis, with central canal stenosis at C6-C7 level, with mild compression of the cervical spinal cord, but no evidence of myelomalacia by MRI March 5, 2020    EXAM: MR CERVICAL SPINE WO CONTRAST  LOCATION: [facility]  DATE/TIME: 3/17/2020 1:46 PM  1.  Multilevel degenerative disc disease of the cervical spine with disc osteophyte complexes, most pronounced at the C5/C6 level where there is moderate to severe spinal canal narrowing and compression of the cervical spinal cord and at the C6/C7 level   where there is moderate spinal canal narrowing and mild compression of the cervical spinal cord. No definite evidence of edema or myelomalacia at these levels.  2.  Multilevel uncovertebral joint disease and facet arthropathy with severe multilevel neural foraminal narrowing at C3/C4-C6/C7 as described.     Hypertension with elevated systolic component, which I believe is probably from arterial compliance, I think is on a reasonable blood pressure regime; BUN and creatinine normal when measured on October 23, 2019.  Blood pressure is pretty good according to her home machine.     Current medications are losartan hydrochlorothiazide as a fixed dose combination pill and also she takes Cartia XT for supraventricular tachycardia suppression, but that also has blood pressure lowering effects.     Her metabolic panel checked on October 23, 2019 was satisfactory with normal potassium, BUN, and creatinine of 0.93, which is now normalized compared to previous readings of about 1.23.    History of benign monoclonal  gammopathy.    Serum protein electrophoresis from specimen drawn October 23, 2019 showed no monoclonal protein.  I think we can stop checking.     Hyperlipidemia on treatment.  Lipid profile looks great when checked August 29.  Continue on atorvastatin 10 milligrams a day.     Supraventricular tachycardia on Cartia XT for the last 2 years.  She only gets occasional brief fluttering episodes, nothing sustained.     Menopause with history of previous left oophrectomy.  Uterus is still in place.     History of adenomatous colon polyp.  Last colonoscopy was October 2015, and she has stopped colon screening.     Vulvar lichen sclerosus, for which she uses topical steroid ointment with good effect.     I told her to get her tetanus booster at a community pharmacy under her Medicare drug benefit.  She is already received her seasonal influenza vaccine.    She has received Prevnar 13 and pneumococcal 23 polysaccharide already.    Phone call duration:  14 minutes    Oriana Tinajero CMA

## 2021-06-07 NOTE — PROGRESS NOTES
"Kim Valdes is a 85 y.o. female who is being evaluated via a billable telephone visit.      The patient has been notified of following:     \"This telephone visit will be conducted via a call between you and your physician/provider. We have found that certain health care needs can be provided without the need for a physical exam.  This service lets us provide the care you need with a short phone conversation.  If a prescription is necessary we can send it directly to your pharmacy.  If lab work is needed we can place an order for that and you can then stop by our lab to have the test done at a later time.    If during the course of the call the physician/provider feels a telephone visit is not appropriate, you will not be charged for this service.\"     Kim Valdes complains of lumbar and cervical stenosis    86 yo female who presents with low back and leg pain.  We will also have tingling in the right foot only.  MRI shows multilevel degenerative disc disease with severe spinal canal stenosis and bilateral recess narrowing at the lumbar 4-lumbar 5 level appears most responsible for her symptoms.  Lumbar  dynamic x-rays were negative for significant instability.  Darin treatment options including lumbar decompression versus conservative management.  She would like to proceed with a lumbar 4-lumbar 5 epidural prior to considering surgical options.  Also has hyperreflexia on exam and slight imbalance with tandem gait cervical spine to rule out any stenosis.  Follow-up in 4 weeks    Since her last visit patient decided not to undergo lumbar injection.  She felt the injection was too risky for her.  Pain is currently controlled on aspirin.  She has urinary urgency with incontinence occasionally when she waits too long to get out of bed in the morning to go to the bathroom.  She otherwise has no incontinence of bowel or bladder.  Denies saddle anesthesia or weakness of the legs or arms.  Denies difficulties with " fine motor tasks.      I have reviewed and updated the patient's Past Medical History, Social History, Family History and Medication List.    ALLERGIES  Omeprazole    Assessment/Plan:  85-year-old female with lumbar and cervical stenosis.  MRI shows severe lumbar narrowing at the lumbar 4-lumbar 5 level.  Additionly has cervical stenosis with cord flattening most significant at the cervical 5-cervical 6 level and to a lesser extent at the cervical 6-cervical 7 level.  Patient has hyperreflexia on exam but denies any other cervical myelopathy symptoms.  At this time patient feels she is not symptomatic enough to undergo any spine surgery.  She would like to continue physical therapy only.  She will follow-up in her clinic in 3 months.  Discussed all the symptoms of cervical myelopathy as well as lumbar stenosis.  She will call her office if she develops any new or worsening neurological symptoms.    Phone call duration: 8 minutes    Michelle Mathew MD

## 2021-06-11 NOTE — PROGRESS NOTES
Assessment and Plan:     Annual wellness visit for Mrs. Valdes who is 85 years old and is overall doing quite well, really no new problems since our previous visit of April 29, 2020  Overall stable, maintaining her mobility, medication regimen reviewed and looks very reasonable.      Advanced lumbar spondylosis and central canal stenosis with Neurogenic claudication and right lower extremity radiculopathic symptoms with subtle findings of distal muscle weakness; continuing with conservative management strategy.     I told her that she might benefit from use of a Swiss rubber ball, which might help her do core muscle strengthening exercises for her lower back and abdominals.  I told her that a physical therapist could demonstrate those techniques for her, and also recommend the appropriate size rubber ball.    She describes pain in both buttocks, going into both lower extremities, right worse than left.  She is able to sleep comfortably, but the pain and stiffness are worse when she first gets out of bed.  They get a little bit better once she has become more active over the course of the morning, but she will have pain if she stands in place.  Sitting down gives her relief.     EXAM: MR LUMBAR SPINE WO CONTRAST  LOCATION: [facility]  DATE/TIME: 2/11/2020 2:24 PM     IMPRESSION:   1.  Multilevel degenerative disc disease of the lumbar spine with symmetric disc bulges, most pronounced at the L4/L5 level where there is severe spinal canal and bilateral lateral recess narrowing (AP diameter the spinal canal measuring 3 mm).  2.  Advanced bilateral facet arthropathy and bilateral facet joint effusions at L4/L5. Flexion and extension views could be performed to evaluate for dynamic instability.  3.  Multilevel posterolateral disc disease and facet arthropathy with multilevel neural foraminal narrowing, most pronounced at the L4/L5 level where there is moderate to severe right and moderate left neural foraminal  narrowing.     I told her that as long as she is able to maintain good mobility, without falling, pain is not disabling, and not having problems with bowel and bladder--as long as all that is true, then she can continue with a conservative management strategy of exercise and maximizing mobility.     When she met with the spine clinic on January 31, 2020, she declined trial of gabapentin.  Neurosurgery consultation March 5, 2020, where surgical options were presented to her.  Also discussion about epidural steroid injection, which she has declined thus far.  Follow-up telephone visit with neurosurgery Dr. Mathew March 19, 2020, plan to continue with conservative management.     Cervical spondylosis, with central canal stenosis at C6-C7 level, with mild compression of the cervical spinal cord, but no evidence of myelomalacia by MRI March 5, 2020     EXAM: MR CERVICAL SPINE WO CONTRAST  LOCATION: [facility]  DATE/TIME: 3/17/2020 1:46 PM  1.  Multilevel degenerative disc disease of the cervical spine with disc osteophyte complexes, most pronounced at the C5/C6 level where there is moderate to severe spinal canal narrowing and compression of the cervical spinal cord and at the C6/C7 level   where there is moderate spinal canal narrowing and mild compression of the cervical spinal cord. No definite evidence of edema or myelomalacia at these levels.  2.  Multilevel uncovertebral joint disease and facet arthropathy with severe multilevel neural foraminal narrowing at C3/C4-C6/C7 as described.     Hypertension with elevated systolic component, which I believe is probably from arterial compliance, I think is on a reasonable blood pressure regime; BUN and creatinine normal when measured on October 23, 2019.    Blood pressure is pretty good according to her home machine.    She takes only half a tablet of the losartan 50/hydrochlorothiazide 12.5.  I would like to see her systolic pressure consistently less than 140.   If we need to escalate blood pressure medication, then I would have her take the full tablet of losartan hydrochlorothiazide.    BP Readings from Last 3 Encounters:   09/01/20 172/70   03/05/20 149/74   01/31/20 143/66     Current medications are losartan hydrochlorothiazide as a fixed dose combination pill and also she takes Cartia XT for supraventricular tachycardia suppression, but that also has blood pressure lowering effects.     We will check metabolic panel today.     History of benign monoclonal gammopathy.    Serum protein electrophoresis from specimen drawn October 23, 2019 showed no monoclonal protein.  I think we can stop checking.     Hyperlipidemia on treatment.  Lipid profile looks great when checked August 29, 2019.  Continue on atorvastatin 10 milligrams a day.     Supraventricular tachycardia on Cartia XT for the last 2 years.  She only gets occasional brief fluttering episodes, nothing sustained.     Menopause with history of previous left oophrectomy.  Uterus is still in place.     History of adenomatous colon polyp.  Last colonoscopy was October 2015, and she has stopped colon screening.  The October 2015 colonoscopy showed diverticulosis, no polyps.  She can stop colon screening at this point.     Vulvar lichen sclerosus, for which she uses topical steroid ointment with good effect.    Ear wax on the left (canal is somewhat narrow), for which she could use an over-the-counter wax dissolving eardrop, example brands Debrox or Murine.      I reminded her to get her double strength influenza vaccine.  Also she might consider getting the 2 shot shingles vaccine at a community pharmacy under her Medicare prescription drug benefit.  She has received Prevnar 13 and pneumococcal 23 polysaccharide already.    Immunization History   Administered Date(s) Administered     Influenza high dose,seasonal,PF, 65+ yrs 10/19/2015, 10/03/2016, 10/17/2017, 10/02/2018, 10/23/2019     Influenza, inj,  historic,unspecified 11/06/2007, 10/31/2008, 11/25/2009, 10/20/2010, 11/18/2011     Influenza, seasonal,quad inj 6-35 mos 11/14/2012, 11/15/2013, 10/10/2014     Pneumo Conj 13-V (2010&after) 04/22/2016     Pneumo Polysac 23-V 10/30/2002, 12/28/2007     Td,adult,historic,unspecified 12/01/2009     Tdap 12/01/2009     The patient's current medical problems were reviewed.    I have had an Advance Directives discussion with the patient.  The following health maintenance schedule was reviewed with the patient and provided in printed form in the after visit summary:   Health Maintenance   Topic Date Due     ZOSTER VACCINES (1 of 2) 02/05/1986     DXA SCAN  02/05/2001     TD 18+ HE  12/01/2019     INFLUENZA VACCINE RULE BASED (1) 08/01/2020     FALL RISK ASSESSMENT  08/29/2020     MEDICARE ANNUAL WELLNESS VISIT  08/29/2020     ADVANCE CARE PLANNING  08/29/2024     PNEUMOCOCCAL IMMUNIZATION 65+ LOW/MEDIUM RISK  Completed     HEPATITIS B VACCINES  Aged Out        Subjective:   Chief Complaint: Kim Valdes is an 85 y.o. female here for an Annual Wellness visit.   HPI:     Annual wellness visit for Mrs. Valdes who is 85 years old and is overall doing quite well, really no new problems since our previous visit of April 29, 2020  Overall stable, maintaining her mobility, medication regimen reviewed and looks very reasonable.      Advanced lumbar spondylosis and central canal stenosis with Neurogenic claudication and right lower extremity radiculopathic symptoms with subtle findings of distal muscle weakness; continuing with conservative management strategy.       Review of Systems:   Please see above.  The rest of the review of systems are negative for all systems.    Patient Care Team:  Bryan Way MD as PCP - General (Internal Medicine)  Hugh Leigh DPM (General Surgery)  Bryan Way MD as Assigned PCP     Patient Active Problem List   Diagnosis     Symptomatic Menopause     Hypercholesteremia      Adenomatous polyp     SVT (supraventricular tachycardia) (H)     Essential hypertension     Renal insufficiency     Spinal stenosis of lumbar region with neurogenic claudication     Cervical stenosis of spinal canal     Past Medical History:   Diagnosis Date     Cholecystitis     Created by Conversion       Past Surgical History:   Procedure Laterality Date     APPENDECTOMY  age 13     LAPAROSCOPIC CHOLECYSTECTOMY  mid 60's     OOPHORECTOMY Left age 61      Family History   Problem Relation Age of Onset     Heart attack Father      Stroke Mother       Social History     Socioeconomic History     Marital status:      Spouse name: Not on file     Number of children: Not on file     Years of education: Not on file     Highest education level: Not on file   Occupational History     Not on file   Social Needs     Financial resource strain: Not on file     Food insecurity     Worry: Not on file     Inability: Not on file     Transportation needs     Medical: Not on file     Non-medical: Not on file   Tobacco Use     Smoking status: Never Smoker     Smokeless tobacco: Never Used   Substance and Sexual Activity     Alcohol use: Not on file     Drug use: Not on file     Sexual activity: Not on file   Lifestyle     Physical activity     Days per week: Not on file     Minutes per session: Not on file     Stress: Not on file   Relationships     Social connections     Talks on phone: Not on file     Gets together: Not on file     Attends [employer] service: Not on file     Active member of club or organization: Not on file     Attends meetings of clubs or organizations: Not on file     Relationship status: Not on file     Intimate partner violence     Fear of current or ex partner: Not on file     Emotionally abused: Not on file     Physically abused: Not on file     Forced sexual activity: Not on file   Other Topics Concern     Not on file   Social History Narrative     Not on file      Current Outpatient Medications  "  Medication Sig Dispense Refill     aspirin 81 MG EC tablet Take 81 mg by mouth every evening.       atorvastatin (LIPITOR) 10 MG tablet Take 1 tablet (10 mg total) by mouth at bedtime. 90 tablet 3     CARTIA  mg 24 hr capsule TAKE 1 CAPSULE BY MOUTH ONCE DAILY 90 capsule 3     losartan-hydrochlorothiazide (HYZAAR) 50-12.5 mg per tablet Take 0.5 tablets by mouth daily. Takes half tablet daily 45 tablet 3     mometasone (ELOCON) 0.1 % ointment Apply thin layer QD to affected area when symptoms present, once a week for maintenance       No current facility-administered medications for this visit.       Objective:   Vital Signs:   Visit Vitals  /70 (Patient Site: Right Arm, Patient Position: Sitting, Cuff Size: Adult Regular)   Pulse 68   Temp 97.5  F (36.4  C) (Oral)   Ht 5' 6\" (1.676 m)   Wt 126 lb 11.2 oz (57.5 kg)   SpO2 96%   BMI 20.45 kg/m       Recheck  BP Readings from Last 3 Encounters:   09/01/20 144/70   03/05/20 149/74   01/31/20 143/66     VisionScreening:  No exam data present     PHYSICAL EXAM  General: Alert, in no distress.  Normal mental status, cognitively seems quite intact  Skin: No significant lesion seen.  Eyes/nose/throat: Eyes without scleral icterus, eye movements normal, pupils equal and reactive, oropharynx clear,   Bilateral hearing aids.  Bit of wax on the left  MSK: + Neck with reduced range of motion  + Thoracic kyphosis  Lymphatic: Neck without adenopathy or masses  Endocrine: Thyroid with no nodules to palpation  Pulm: Lungs clear to auscultation bilaterally  Cardiac: Heart with regular rate and rhythm, no murmur or gallop  GI: Abdomen soft, nontender. No palpable enlargement of liver or spleen  MSK: Extremities no tenderness or edema  Neuro: Moves all extremities, without focal weakness  Psych: Alert, normal mental status. Normal affect and speech    Assessment Results 9/1/2020   Activities of Daily Living No help needed   Instrumental Activities of Daily Living No help " needed   Get Up and Go Score -   Mini Cog Total Score 5   Some recent data might be hidden     A Mini-Cog score of 0-2 suggests the possibility of dementia, score of 3-5 suggests no dementia    Identified Health Risks:     The patient was provided with written information regarding signs of hearing loss.  Patient's advanced directive was discussed and I am comfortable with the patient's wishes.

## 2021-06-11 NOTE — PROGRESS NOTES
"[facility] Rehabilitation Daily Progress     Patient Name: Kim Valdes  Date: 2020  Visit #: 10, prn  Date of evaluation and POC:2020-2020  Referral Diagnosis: Scoliosis of lumbar spine, unspecified scoliosis type  Referring provider: Bryan Way MD  Visit Diagnosis:     ICD-10-CM    1. Chronic bilateral low back pain without sciatica  M54.5     G89.29    2. Weakness of trunk musculature  M62.81    3. Generalized muscle weakness  M62.81          Assessment:     Patient returns for her first follow-up to learn ball stability exercises.  She did not look comfortable sitting on the ball or adding arm and leg movements.  She felt she was \"working her muscles\" too hard on the ball as well.  Unsure if she will end up purchasing a ball for exercising but she will think about about.  She will think about if she will use it often enough to make the purchase and think about storage of the ball.  Patient is appropriate to continue with skilled physical therapy intervention, as indicated by initial plan of care.    Goal Status:  No data recorded  Pt will: Able to learn a few more strengthening exercises for her HEP to help manage symptoms within 8-12 visits      Plan / Patient Education:     review ball exercises:  Stretches and strengthening  Edu pt on where to purchase a ball, size of ball and how to blow it up if she is interested in purchasing one.    Pt's , Lon, was present throughout session    Subjective:     Pain Ratin    Everything is about the same.  No significant changes since last visit      Objective:     Patient Active Problem List   Diagnosis     Symptomatic Menopause     Hypercholesteremia     Adenomatous polyp     SVT (supraventricular tachycardia) (H)     Essential hypertension     Renal insufficiency     Spinal stenosis of lumbar region with neurogenic claudication     Cervical stenosis of spinal canal         Treatment Today     Exercise #1: stretches:  " SKC, LTR, sitting hamstring, hold 30 seconds x 1-3 reps  Comment #1: posterior pelvic tilt in supine   hold 5-10 seconds x 5-10 reps  Exercise #2: bridge holding 10 seconds x 6 reps  Comment #2: clamshells  X 20  Exercise #3: sit<>stands  3-5 chairs  X 5-10 reps  Comment #3: ball:  pelvic tilts, circles CW and CCW, side to side  x 10 each  Exercise #4: sitting on ball:  shoulder flex alternating arms, knee ext and hip flex  X 10 each  Comment #4: sitting on ball:  bouncing X 10  Exercise #5: supine:  bridging with calves over ball:  too hard for pt  Comment #5: ball under hips:  all 4's    leg ext  X 5 each side  Exercise #6: demonstrated: reverse bridge with head and shoulders on ball, hand walk out with feet on ball.        TREATMENT MINUTES COMMENTS   Evaluation     Self-care/ Home management     Manual therapy     Neuromuscular Re-education     Therapeutic Activity     Therapeutic Exercises 30 See above or flow sheet  [id number]   Gait training     Modality__________________                Total 30    Blank areas are intentional and mean the treatment did not include these items.       Khushbu Nogueira, PT  9/30/2020

## 2021-06-12 NOTE — PROGRESS NOTES
[facility] Discharge Summary  Patient Name: Kim Valdes  Date: 11/4/2020  Referral Diagnosis: back pain  Referring provider: Bryan Way MD  Visit Diagnosis:   1. Chronic bilateral low back pain without sciatica     2. Weakness of trunk musculature     3. Generalized muscle weakness         Goals:  No data recorded  Pt will: Able to learn a few more strengthening exercises for her HEP to help manage symptoms within 8-12 visits      Patient was seen for 3 visits from 9/21/2020 to 10/5/2020 with 0 missed appointments.  patient stated she is doing well and felt she understood the exercises and did not need any more therapy sessions.    Therapy will be discontinued at this time.  The patient will need a new referral to resume.    Thank you for your referral.  Khushbu Nogueira  11/4/2020  9:59 AM    [facility] Rehabilitation Daily Progress     Patient Name: Kim Valdes  Date: 10/5/2020  Visit #: 3/8-10, prn  Date of evaluation and POC:9/21/2020-12/18/2020  Referral Diagnosis: Scoliosis of lumbar spine, unspecified scoliosis type  Referring provider: Bryan Way MD  Visit Diagnosis:     ICD-10-CM    1. Chronic bilateral low back pain without sciatica  M54.5     G89.29    2. Weakness of trunk musculature  M62.81    3. Generalized muscle weakness  M62.81          Assessment:     Patient feels she does like the ball and will look into purchasing one.  She has a pump so knows she can blow it up.  She was able to demonstrate a good understanding of the exercises on the ball but did need to grab onto the table multiple times for support so she didn't fall.  She feels she is stable enough and likes the ball.  Patient is appropriate to continue with skilled physical therapy intervention, as indicated by initial plan of care.    Goal Status:  No data recorded  Pt will: Able to learn a few more strengthening exercises for her HEP to help manage symptoms within 8-12 visits      Plan /  Patient Education:     review ball exercises:  Stretches and strengthening  Edu pt on where to purchase a ball, size of ball and how to blow it up if she is interested in purchasing one.    Pt's , Lon, was present throughout session    Subjective:     Pain Ratin    I work with excruciating pain in my left calf last night.  I took a hot shower and it went away.  I wonder if it is a blood clot.        Objective:     Patient Active Problem List   Diagnosis     Symptomatic Menopause     Hypercholesteremia     Adenomatous polyp     SVT (supraventricular tachycardia) (H)     Essential hypertension     Renal insufficiency     Spinal stenosis of lumbar region with neurogenic claudication     Cervical stenosis of spinal canal         Treatment Today     Exercise #1: stretches:  SKC, LTR, sitting hamstring, hold 30 seconds x 1-3 reps  Comment #1: posterior pelvic tilt in supine   hold 5-10 seconds x 5-10 reps  Exercise #2: bridge holding 10 seconds x 6 reps  Comment #2: clamshells  X 20  Exercise #3: sit<>stands  3-5 chairs  X 5-10 reps  Comment #3: ball:  pelvic tilts, circles CW and CCW, side to side  x 10 each  Exercise #4: sitting on ball:  shoulder flex alternating arms, knee ext and hip flex  X 10 each  Comment #4: sitting on ball:  bouncing X 10  Exercise #5: supine:  bridging with calves over ball:  Comment #5: ball under hips:  all 4's    leg ext  X 5 each side  Exercise #6: demonstrated: reverse bridge with head and shoulders on ball, hand walk out with feet on ball.    Calf was not warm or sore to the touch.  She did feel her toes curled  When she felt the pain so feel it may have been more of a cramp.  Edu pt if she feels it was a blood clot to go to her MD.    TREATMENT MINUTES COMMENTS   Evaluation     Self-care/ Home management 5 On blood clots versus a cramps   Manual therapy     Neuromuscular Re-education     Therapeutic Activity     Therapeutic Exercises 23 See above or flow sheet  [id number]    Gait training     Modality__________________                Total 28    Blank areas are intentional and mean the treatment did not include these items.       Khushbu Nogueira, PT  10/5/2020

## 2021-06-12 NOTE — PROGRESS NOTES
"Office Visit - Physical    Kim Valdes   82 y.o. female    Date of Visit: 8/21/2017    Chief Complaint   Patient presents with     Medicare Annual Wellness Visit Initial     Pt is fasting       Subjective: Maged is 82 years old and is here for routine physical and annual wellness visit.  Generally healthy with history of benign monoclonal gammopathy in addition to hypertension hyperlipidemia and intermittent episodes of SVT.  Has not had recent episodes of palpitations and has done well on diltiazem.  Is on hormone replacement with Estrace cream and has done well with this without evidence of bleeding or complications    Reviewed the patient questionnaire including health risk assessment cognitive assessment and PHQ 9 these are unremarkable.  Reviewed advanced directive with her she remains a \"full code this was discussed at length    Personal social and family history again reviewed no recent changes or concerns    Current medications reviewed discussed and reconciled    Comprehensive labs ordered    No other specific complaints        ROS: A comprehensive review of systems was performed and was otherwise negative except as mentioned above.    Exam   Normal mental status no cognitive deficit skin and lymphatics normal mild bruising    EENT negative lungs clear breasts negative heart normal rhythm regular sinus head and neck negative abdomen normal extremities negative neuro exam normal  /72  Pulse 66  Ht 5' 6\" (1.676 m)  Wt 139 lb (63 kg)  BMI 22.44 kg/m2    Assessment and Plan    Stable annual wellness visit labs pending    Kim was seen today for medicare annual wellness visit initial.    Diagnoses and all orders for this visit:    Routine general medical examination at a health care facility    Benign Monoclonal Hypergammaglobulinemia    Diverticulosis  -     HM2(CBC w/o Differential); Future  -     Comprehensive Metabolic Panel; Future  -     HM2(CBC w/o Differential)  -     Comprehensive " Metabolic Panel    Essential hypertension with goal blood pressure less than 140/90  -     HM2(CBC w/o Differential); Future  -     Comprehensive Metabolic Panel; Future  -     HM2(CBC w/o Differential)  -     Comprehensive Metabolic Panel    Hypercholesteremia  -     Lipid Cascade; Future  -     Lipid Cascade    Pleurodynia  -     HM2(CBC w/o Differential); Future  -     Comprehensive Metabolic Panel; Future  -     HM2(CBC w/o Differential)  -     Comprehensive Metabolic Panel    SVT (supraventricular tachycardia)  -     HM2(CBC w/o Differential); Future  -     Comprehensive Metabolic Panel; Future  -     HM2(CBC w/o Differential)  -     Comprehensive Metabolic Panel    Palpitations  -     HM2(CBC w/o Differential); Future  -     Comprehensive Metabolic Panel; Future  -     HM2(CBC w/o Differential)  -     Comprehensive Metabolic Panel    Adenomatous polyp              Medications:   Prior to Admission medications    Medication Sig Start Date End Date Taking? Authorizing Provider   aspirin 81 MG EC tablet Take 81 mg by mouth every evening.   Yes PROVIDER, HISTORICAL   atorvastatin (LIPITOR) 10 MG tablet TAKE ONE TABLET BY MOUTH ONCE DAILY AT BEDTIME 6/20/17  Yes Greg Rodriguez MD   diltiazem (CARDIZEM CD) 120 MG 24 hr capsule Take 1 capsule (120 mg total) by mouth daily. 10/3/16  Yes Greg Rodriguez MD   estradiol (ESTRACE) 0.01 % (0.1 mg/gram) vaginal cream Insert 2 g into the vagina every 14 (fourteen) days.    Yes PROVIDER, HISTORICAL   losartan-hydrochlorothiazide (HYZAAR) 50-12.5 mg per tablet Take 1 tablet by mouth daily. 4/14/17  Yes Greg Rodriguez MD       Allergies:  Allergies   Allergen Reactions     Omeprazole        Immunizations:   Immunization History   Administered Date(s) Administered     Influenza high dose, seasonal 10/19/2015, 10/03/2016     Influenza, inj, historic 11/06/2007, 10/31/2008, 11/25/2009, 10/20/2010, 11/18/2011     Influenza, seasonal,quad inj 6-35 mos 11/14/2012, 11/15/2013,  10/10/2014     Pneumo Conj 13-V (2010&after) 04/22/2016     Pneumo Polysac 23-V 10/30/2002, 12/28/2007     Td, historic 12/01/2009       Past Medical History: No past medical history on file.    Past Surgical History: No past surgical history on file.    Family History:   Family History   Problem Relation Age of Onset     Heart attack Father      Stroke Mother        Social History:   Social History     Social History     Marital status:      Spouse name: N/A     Number of children: N/A     Years of education: N/A     Occupational History     Not on file.     Social History Main Topics     Smoking status: Never Smoker     Smokeless tobacco: Never Used     Alcohol use Not on file     Drug use: Not on file     Sexual activity: Not on file     Other Topics Concern     Not on file     Social History Narrative         Greg Rodriguez MD      Assessment and Plan:       1. Routine general medical examination at a health care facility      2. Benign Monoclonal Hypergammaglobulinemia      3. Diverticulosis    - HM2(CBC w/o Differential); Future  - Comprehensive Metabolic Panel; Future  - HM2(CBC w/o Differential)  - Comprehensive Metabolic Panel    4. Essential hypertension with goal blood pressure less than 140/90    - HM2(CBC w/o Differential); Future  - Comprehensive Metabolic Panel; Future  - HM2(CBC w/o Differential)  - Comprehensive Metabolic Panel    5. Hypercholesteremia    - Lipid Cascade; Future  - Lipid Cascade    6. Pleurodynia    - HM2(CBC w/o Differential); Future  - Comprehensive Metabolic Panel; Future  - HM2(CBC w/o Differential)  - Comprehensive Metabolic Panel    7. SVT (supraventricular tachycardia)    - HM2(CBC w/o Differential); Future  - Comprehensive Metabolic Panel; Future  - HM2(CBC w/o Differential)  - Comprehensive Metabolic Panel    8. Palpitations    - HM2(CBC w/o Differential); Future  - Comprehensive Metabolic Panel; Future  - HM2(CBC w/o Differential)  - Comprehensive Metabolic  Panel    9. Adenomatous polyp        The patient's current medical problems were reviewed.      The following health maintenance schedule was reviewed with the patient and provided in printed form in the after visit summary:   Health Maintenance   Topic Date Due     ZOSTER VACCINE  02/05/1996     DXA SCAN  02/05/2001     FALL RISK ASSESSMENT  04/22/2017     INFLUENZA VACCINE RULE BASED (1) 08/01/2017     TD 18+ HE  12/01/2019     ADVANCE DIRECTIVES DISCUSSED WITH PATIENT  04/22/2021     PNEUMOCOCCAL POLYSACCHARIDE VACCINE AGE 65 AND OVER  Completed     PNEUMOCOCCAL CONJUGATE VACCINE FOR ADULTS (PCV13 OR PREVNAR)  Completed        Subjective:   Chief Complaint: Kim Valdes is an 82 y.o. female here for an Annual Wellness visit.   HPI:      Review of Systems:    Please see above.  The rest of the review of systems are negative for all systems.    Patient Care Team:  Greg Rodriguez MD as PCP - General     Patient Active Problem List   Diagnosis     Symptomatic Menopause     Hypercholesteremia     Dyspepsia     Cholecystitis     Serum Enzyme Levels - ALT (SGPT) Elevated     Cough     Benign Monoclonal Hypergammaglobulinemia     Diverticulosis     Adenomatous polyp     Pleurodynia     SVT (supraventricular tachycardia)     Essential hypertension     Shingles     Palpitations     Low back ache     No past medical history on file.   No past surgical history on file.   Family History   Problem Relation Age of Onset     Heart attack Father      Stroke Mother       Social History     Social History     Marital status:      Spouse name: N/A     Number of children: N/A     Years of education: N/A     Occupational History     Not on file.     Social History Main Topics     Smoking status: Never Smoker     Smokeless tobacco: Never Used     Alcohol use Not on file     Drug use: Not on file     Sexual activity: Not on file     Other Topics Concern     Not on file     Social History Narrative      Current Outpatient  "Prescriptions   Medication Sig Dispense Refill     aspirin 81 MG EC tablet Take 81 mg by mouth every evening.       atorvastatin (LIPITOR) 10 MG tablet TAKE ONE TABLET BY MOUTH ONCE DAILY AT BEDTIME 90 tablet 3     diltiazem (CARDIZEM CD) 120 MG 24 hr capsule Take 1 capsule (120 mg total) by mouth daily. 90 capsule 12     estradiol (ESTRACE) 0.01 % (0.1 mg/gram) vaginal cream Insert 2 g into the vagina every 14 (fourteen) days.        losartan-hydrochlorothiazide (HYZAAR) 50-12.5 mg per tablet Take 1 tablet by mouth daily. 90 tablet 3     No current facility-administered medications for this visit.       Objective:   Vital Signs:   Visit Vitals     /72     Pulse 66     Ht 5' 6\" (1.676 m)     Wt 139 lb (63 kg)     BMI 22.44 kg/m2        VisionScreening:  No exam data present     PHYSICAL EXAM      Assessment Results 8/21/2017   Activities of Daily Living No help needed   Instrumental Activities of Daily Living No help needed   Get Up and Go Score Less than 12 seconds   Mini Cog Total Score 5   Some recent data might be hidden     A Mini-Cog score of 0-2 suggests the possibility of dementia, score of 3-5 suggests no dementia    Identified Health Risks:     The patient was counseled and encouraged to consider modifying their diet and eating habits. She was provided with information on recommended healthy diet options.  The patient was provided with written information regarding signs of hearing loss.  Patient's advanced directive was discussed and the patient's wishes.        "

## 2021-06-15 NOTE — TELEPHONE ENCOUNTER
Fax received from [facility] in [place] stating that patient reports taking one full tablet of Losartan hydrochlorothiazide instead of 1/2 tablet. If this is correct, please send in new prescription to reflect this. Order pended.  Oriana Tinajero CMA ............... 4:53 PM, 02/04/21

## 2021-06-16 PROBLEM — M48.02 CERVICAL STENOSIS OF SPINAL CANAL: Status: ACTIVE | Noted: 2020-04-29

## 2021-06-16 PROBLEM — N28.9 RENAL INSUFFICIENCY: Status: ACTIVE | Noted: 2019-10-23

## 2021-06-16 PROBLEM — M48.062 SPINAL STENOSIS OF LUMBAR REGION WITH NEUROGENIC CLAUDICATION: Status: ACTIVE | Noted: 2020-01-29

## 2021-06-16 NOTE — PROGRESS NOTES
Office Visit - Follow Up   Kim Valdes   86 y.o. female    Date of Visit: 4/23/2021    Chief Complaint   Patient presents with     Follow-up     Pain right hip        -------------------------------------------------------------------------------------------------------------------------  Assessment and Plan    Follow-up visit for multiple issues, but overall she is doing well since our last visit which was for annual wellness September 2020.    Overall stable, maintaining her mobility, medication regimen reviewed and looks very reasonable    She did report some right gluteal pain that comes and goes, and I think that is probably muscular deconditioning, but could relate to her lumbar spinal stenosis.  I recommended that she continue with the core muscle strengthening exercises which she knows how to do.  She owns a Swiss ball.    She had lab work done in September 2020 which was all satisfactory.  Today April 23, 2021, lets just get a basic metabolic panel.    Going issue by issue:    Advanced lumbar spondylosis and central canal stenosis with Neurogenic claudication and right lower extremity radiculopathic symptoms with subtle findings of distal muscle weakness; continuing with conservative management strategy.     Keep using Swiss rubber ball, which might help her do core muscle strengthening exercises for her lower back and abdominals.     She describes pain in both buttocks, going into both lower extremities, right worse than left.  She is able to sleep comfortably, but the pain and stiffness are worse when she first gets out of bed.  They get a little bit better once she has become more active over the course of the morning, but she will have pain if she stands in place.  Sitting down gives her relief.     EXAM: MR LUMBAR SPINE WO CONTRAST  LOCATION: [facility]  DATE/TIME: 2/11/2020 2:24 PM     IMPRESSION:   1.  Multilevel degenerative disc disease of the lumbar spine with symmetric disc bulges,  most pronounced at the L4/L5 level where there is severe spinal canal and bilateral lateral recess narrowing (AP diameter the spinal canal measuring 3 mm).  2.  Advanced bilateral facet arthropathy and bilateral facet joint effusions at L4/L5. Flexion and extension views could be performed to evaluate for dynamic instability.  3.  Multilevel posterolateral disc disease and facet arthropathy with multilevel neural foraminal narrowing, most pronounced at the L4/L5 level where there is moderate to severe right and moderate left neural foraminal narrowing.     Continue with a conservative management strategy of exercise and maximizing mobility.      When she met with the spine clinic on January 31, 2020, she declined trial of gabapentin.  Neurosurgery consultation March 5, 2020, where surgical options were presented to her.  Also discussion about epidural steroid injection, which she has declined thus far.     Cervical spondylosis, with central canal stenosis at C6-C7 level, with mild compression of the cervical spinal cord, but no evidence of myelomalacia by MRI March 5, 2020     EXAM: MR CERVICAL SPINE WO CONTRAST  LOCATION: [facility]  DATE/TIME: 3/17/2020 1:46 PM  1.  Multilevel degenerative disc disease of the cervical spine with disc osteophyte complexes, most pronounced at the C5/C6 level where there is moderate to severe spinal canal narrowing and compression of the cervical spinal cord and at the C6/C7 level   where there is moderate spinal canal narrowing and mild compression of the cervical spinal cord. No definite evidence of edema or myelomalacia at these levels.  2.  Multilevel uncovertebral joint disease and facet arthropathy with severe multilevel neural foraminal narrowing at C3/C4-C6/C7 as described.     Hypertension with elevated systolic component, blood pressure satisfactory today April 23, 2021     BP Readings from Last 3 Encounters:   04/23/21 120/80   09/01/20 144/70   03/05/20 149/74      She takes full tablet of the losartan 50/hydrochlorothiazide 12.5.    Lab Results   Component Value Date    CREATININE 1.10 09/01/2020    BUN 22 09/01/2020     09/01/2020    K 4.1 09/01/2020     09/01/2020    CO2 26 09/01/2020     Current medications are losartan hydrochlorothiazide as a fixed dose combination pill and also she takes Cartia XT for supraventricular tachycardia suppression, but that also has blood pressure lowering effects.     We will check metabolic panel today.     History of benign monoclonal gammopathy.    Serum protein electrophoresis from specimen drawn October 23, 2019 showed no monoclonal protein.  I think we can stop checking.     Hyperlipidemia on treatment.  Lipid profile looks great when checked September 1, 2020. Ccontinue on atorvastatin 10 milligrams a day.    Lab Results   Component Value Date    CHOL 163 09/01/2020    CHOL 163 08/29/2019    CHOL 185 08/27/2018     Lab Results   Component Value Date    HDL 60 09/01/2020    HDL 62 08/29/2019    HDL 61 08/27/2018     Lab Results   Component Value Date    LDLCALC 84 09/01/2020    LDLCALC 83 08/29/2019    LDLCALC 106 08/27/2018     Lab Results   Component Value Date    TRIG 93 09/01/2020    TRIG 89 08/29/2019    TRIG 92 08/27/2018     No components found for: CHOLHDL      Supraventricular tachycardia on Cartia XT for the last 2 years.  She only gets occasional brief fluttering episodes, nothing sustained.     Menopause with history of previous left oophrectomy.  Uterus is still in place.    Probably has at least osteopenia, probably osteoporosis, and I would like her to restart taking calcium and vitamin D supplement.  The amount of calcium is 1000 mg of elemental calcium per day.  The form of calcium can be calcium carbonate (for example Tums) or calcium citrate (Citracal or Caltrate)  The amount of the element calcium is reported on the label of these products.  Also over-the-counter vitamin D 2000 units a  day.     History of adenomatous colon polyp.  Last colonoscopy was October 2015, and she has stopped colon screening.  The October 2015 colonoscopy showed diverticulosis, no polyps.  She can stop colon screening at this point.     Vulvar lichen sclerosus, for which she uses topical steroid ointment with good effect.     Ear wax on the left (canal is somewhat narrow), for which she could use an over-the-counter wax dissolving eardrop, example brands Debrox or Murine.      She has received Prevnar 13 and pneumococcal 23 polysaccharide already.  Received second dose of Pfizer COVID-19 vaccine February 25, 2021    Immunization History   Administered Date(s) Administered     COVID-19,PF,Pfizer 02/04/2021, 02/25/2021     Influenza high dose,seasonal,PF, 65+ yrs 10/19/2015, 10/03/2016, 10/17/2017, 10/02/2018, 10/23/2019     Influenza, inj, historic,unspecified 11/06/2007, 10/31/2008, 11/25/2009, 10/20/2010, 11/18/2011     Influenza, seasonal,quad inj 6-35 mos 11/14/2012, 11/15/2013, 10/10/2014     Influenza,quad,high Dose,PF, 65yr + 10/06/2020     Pneumo Conj 13-V (2010&after) 04/22/2016     Pneumo Polysac 23-V 10/30/2002, 12/28/2007     Td,adult,historic,unspecified 12/01/2009     Tdap 12/01/2009         --------------------------------------------------------------------------------------------------------------------------  History of Present Illness  This 86 y.o. old     Follow-up visit for multiple issues, but overall she is doing well since our last visit which was for annual wellness September 2020.    Overall stable, maintaining her mobility, medication regimen reviewed and looks very reasonable    She did report some right gluteal pain that comes and goes, and I think that is probably muscular deconditioning, but could relate to her lumbar spinal stenosis.  I recommended that she continue with the core muscle strengthening exercises which she knows how to do.  She owns a Swiss ball.    She had lab work done in  September 2020 which was all satisfactory.  Today April 23, 2021, lets just get a basic metabolic panel.      Wt Readings from Last 3 Encounters:   04/23/21 131 lb (59.4 kg)   09/01/20 126 lb 11.2 oz (57.5 kg)   03/05/20 131 lb (59.4 kg)     BP Readings from Last 3 Encounters:   04/23/21 120/80   09/01/20 144/70   03/05/20 149/74       Lab Results   Component Value Date    WBC 4.7 09/01/2020    HGB 15.0 09/01/2020    HCT 43.9 09/01/2020     09/01/2020    CHOL 163 09/01/2020    TRIG 93 09/01/2020    HDL 60 09/01/2020    ALT 14 09/01/2020    AST 23 09/01/2020     09/01/2020    K 4.1 09/01/2020     09/01/2020    CREATININE 1.10 09/01/2020    BUN 22 09/01/2020    CO2 26 09/01/2020    TSH 1.15 10/23/2019    INR 0.95 09/10/2015      ---------------------------------------------------------------------------------------------------------------------------    Medications, Allergies, Social, and Problem List   Current Outpatient Medications   Medication Sig Dispense Refill     aspirin 81 MG EC tablet Take 81 mg by mouth every evening.       atorvastatin (LIPITOR) 10 MG tablet Take 1 tablet (10 mg total) by mouth at bedtime. 90 tablet 3     diltiazem (CARTIA XT) 120 MG 24 hr capsule Take 1 capsule (120 mg total) by mouth daily. 90 capsule 3     losartan-hydrochlorothiazide (HYZAAR) 50-12.5 mg per tablet Take 1 tablet by mouth daily. 90 tablet 3     mometasone (ELOCON) 0.1 % ointment Apply thin layer QD to affected area when symptoms present, once a week for maintenance       No current facility-administered medications for this visit.      Allergies   Allergen Reactions     Omeprazole      Social History     Tobacco Use     Smoking status: Never Smoker     Smokeless tobacco: Never Used   Substance Use Topics     Alcohol use: Not on file     Drug use: Not on file     Patient Active Problem List   Diagnosis     Symptomatic Menopause     Hypercholesteremia     Adenomatous polyp     SVT (supraventricular  "tachycardia) (H)     Essential hypertension     Renal insufficiency     Spinal stenosis of lumbar region with neurogenic claudication     Cervical stenosis of spinal canal        Reviewed, reconciled and updated       Physical Exam   General Appearance: Appears well, breathing easily, blood pressure looks great    /80 (Patient Site: Right Arm, Patient Position: Sitting)   Pulse 60   Ht 5' 6\" (1.676 m)   Wt 131 lb (59.4 kg)   SpO2 97%   BMI 21.14 kg/m      Lungs clear  Heart regular rate and rhythm, soft 2/6 systolic ejection murmur heard at the left sternal border  Abdomen nontender  Extremities no edema  Mild tenderness when I press on her right superior gluteal muscle attachment, similar to what I have observed on previous exams.     Additional Information   I spent 30 minutes on this encounter, including reviewing interval history since last visit, examining the patient, explaining and counseling the issues enumerated in the Assessment and Plan (patient given a copy), ordering indicated tests     Bryan Way MD  "

## 2021-06-16 NOTE — PATIENT INSTRUCTIONS - HE
Follow-up visit for multiple issues, but overall she is doing well since our last visit which was for annual wellness September 2020.    Overall stable, maintaining her mobility, medication regimen reviewed and looks very reasonable    She did report some right gluteal pain that comes and goes, and I think that is probably muscular deconditioning, but could relate to her lumbar spinal stenosis.  I recommended that she continue with the core muscle strengthening exercises which she knows how to do.  She owns a Swiss ball.    She had lab work done in September 2020 which was all satisfactory.  Today April 23, 2021, lets just get a basic metabolic panel.    Going issue by issue:    Advanced lumbar spondylosis and central canal stenosis with Neurogenic claudication and right lower extremity radiculopathic symptoms with subtle findings of distal muscle weakness; continuing with conservative management strategy.     Keep using Swiss rubber ball, which might help her do core muscle strengthening exercises for her lower back and abdominals.     She describes pain in both buttocks, going into both lower extremities, right worse than left.  She is able to sleep comfortably, but the pain and stiffness are worse when she first gets out of bed.  They get a little bit better once she has become more active over the course of the morning, but she will have pain if she stands in place.  Sitting down gives her relief.     EXAM: MR LUMBAR SPINE WO CONTRAST  LOCATION: [facility]  DATE/TIME: 2/11/2020 2:24 PM     IMPRESSION:   1.  Multilevel degenerative disc disease of the lumbar spine with symmetric disc bulges, most pronounced at the L4/L5 level where there is severe spinal canal and bilateral lateral recess narrowing (AP diameter the spinal canal measuring 3 mm).  2.  Advanced bilateral facet arthropathy and bilateral facet joint effusions at L4/L5. Flexion and extension views could be performed to evaluate for dynamic  instability.  3.  Multilevel posterolateral disc disease and facet arthropathy with multilevel neural foraminal narrowing, most pronounced at the L4/L5 level where there is moderate to severe right and moderate left neural foraminal narrowing.     Continue with a conservative management strategy of exercise and maximizing mobility.      When she met with the spine clinic on January 31, 2020, she declined trial of gabapentin.  Neurosurgery consultation March 5, 2020, where surgical options were presented to her.  Also discussion about epidural steroid injection, which she has declined thus far.     Cervical spondylosis, with central canal stenosis at C6-C7 level, with mild compression of the cervical spinal cord, but no evidence of myelomalacia by MRI March 5, 2020     EXAM: MR CERVICAL SPINE WO CONTRAST  LOCATION: [facility]  DATE/TIME: 3/17/2020 1:46 PM  1.  Multilevel degenerative disc disease of the cervical spine with disc osteophyte complexes, most pronounced at the C5/C6 level where there is moderate to severe spinal canal narrowing and compression of the cervical spinal cord and at the C6/C7 level   where there is moderate spinal canal narrowing and mild compression of the cervical spinal cord. No definite evidence of edema or myelomalacia at these levels.  2.  Multilevel uncovertebral joint disease and facet arthropathy with severe multilevel neural foraminal narrowing at C3/C4-C6/C7 as described.     Hypertension with elevated systolic component, blood pressure satisfactory today April 23, 2021     BP Readings from Last 3 Encounters:   04/23/21 120/80   09/01/20 144/70   03/05/20 149/74     She takes full tablet of the losartan 50/hydrochlorothiazide 12.5.    BP Readings from Last 3 Encounters:   04/23/21 120/80   09/01/20 144/70   03/05/20 149/74     Current medications are losartan hydrochlorothiazide as a fixed dose combination pill and also she takes Cartia XT for supraventricular tachycardia  suppression, but that also has blood pressure lowering effects.     We will check metabolic panel today.     History of benign monoclonal gammopathy.    Serum protein electrophoresis from specimen drawn October 23, 2019 showed no monoclonal protein.  I think we can stop checking.     Hyperlipidemia on treatment.  Lipid profile looks great when checked September 1, 2020. Ccontinue on atorvastatin 10 milligrams a day.    Lab Results   Component Value Date    CHOL 163 09/01/2020    CHOL 163 08/29/2019    CHOL 185 08/27/2018     Lab Results   Component Value Date    HDL 60 09/01/2020    HDL 62 08/29/2019    HDL 61 08/27/2018     Lab Results   Component Value Date    LDLCALC 84 09/01/2020    LDLCALC 83 08/29/2019    LDLCALC 106 08/27/2018     Lab Results   Component Value Date    TRIG 93 09/01/2020    TRIG 89 08/29/2019    TRIG 92 08/27/2018     No components found for: CHOLHDL      Supraventricular tachycardia on Cartia XT for the last 2 years.  She only gets occasional brief fluttering episodes, nothing sustained.     Menopause with history of previous left oophrectomy.  Uterus is still in place.    Probably has at least osteopenia, probably osteoporosis, and I would like her to restart taking calcium and vitamin D supplement.  The amount of calcium is 1000 mg of elemental calcium per day.  The form of calcium can be calcium carbonate (for example Tums) or calcium citrate (Citracal or Caltrate)  The amount of the element calcium is reported on the label of these products.  Also over-the-counter vitamin D 2000 units a day.     History of adenomatous colon polyp.  Last colonoscopy was October 2015, and she has stopped colon screening.  The October 2015 colonoscopy showed diverticulosis, no polyps.  She can stop colon screening at this point.     Vulvar lichen sclerosus, for which she uses topical steroid ointment with good effect.     Ear wax on the left (canal is somewhat narrow), for which she could use an  over-the-counter wax dissolving eardrop, example brands Debrox or Murine.      She has received Prevnar 13 and pneumococcal 23 polysaccharide already.  Received second dose of Pfizer COVID-19 vaccine February 25, 2021

## 2021-06-17 NOTE — PATIENT INSTRUCTIONS - HE
Patient Instructions by Greg Rodriguez MD at 8/29/2019  9:20 AM     Author: Greg Rodriguez MD Service: -- Author Type: Physician    Filed: 9/3/2019 11:14 AM Encounter Date: 8/29/2019 Status: Signed    : Greg Rodriguez MD (Physician)         Patient Education     Exercise for a Healthier Heart  You may wonder how you can improve the health of your heart. If youre thinking about exercise, youre on the right track. You dont need to become an athlete, but you do need a certain amount of brisk exercise to help strengthen your heart. If you have been diagnosed with a heart condition, your doctor may recommend exercise to help stabilize your condition. To help make exercise a habit, choose safe, fun activities.       Be sure to check with your health care provider before starting an exercise program.    Why exercise?  Exercising regularly offers many healthy rewards. It can help you do all of the following:    Improve your blood cholesterol levels to help prevent further heart trouble    Lower your blood pressure to help prevent a stroke or heart attack    Control diabetes, or reduce your risk of getting this disease    Improve your heart and lung function    Reach and maintain a healthy weight    Make your muscles stronger and more limber so you can stay active    Prevent falls and fractures by slowing the loss of bone mass (osteoporosis)    Manage stress better  Exercise tips  Ease into your routine. Set small goals. Then build on them.  Exercise on most days. Aim for a total of 150 or more minutes of moderate to  vigorous intensity activity each week. Consider 40 minutes, 3 to 4 times a week. For best results, activity should last for 40 minutes on average. It is OK to work up to the 40 minute period over time. Examples of moderate-intensity activity is walking one mile in 15 minutes or 30 to 45 minutes of yard work.  Step up your daily activity level. Along with your exercise program, try being more active  throughout the day. Walk instead of drive. Do more household tasks or yard work.  Choose one or more activities you enjoy. Walking is one of the easiest things you can do. You can also try swimming, riding a bike, or taking an exercise class.  Stop exercising and call your doctor if you:    Have chest pain or feel dizzy or lightheaded    Feel burning, tightness, pressure, or heaviness in your chest, neck, shoulders, back, or arms    Have unusual shortness of breath    Have increased joint or muscle pain    Have palpitations or an irregular heartbeat      7789-3580 [employer]. [address] [place] [address]. All rights reserved. This information is not intended as a substitute for professional medical care. Always follow your healthcare professional's instructions.         Patient Education   Understanding enercast MyPlate  The USDA (US Department of Agriculture) has guidelines to help you make healthy food choices. These are called MyPlate. MyPlate shows the food groups that make up healthy meals using the image of a place setting. Before you eat, think about the healthiest choices for what to put onto your plate or into your cup or bowl. To learn more about building a healthy plate, visit www.choosemyplate.gov.       The Food Groups    Fruits: Any fruit or 100% fruit juice counts as part of the Fruit Group. Fruits may be fresh, canned, frozen, or dried, and may be whole, cut-up, or pureed. Make half your plate fruits and vegetables.    Vegetables: Any vegetable or 100% vegetable juice counts as a member of the Vegetable Group. Vegetables may be fresh, frozen, canned, or dried. They can be served raw or cooked and may be whole, cut-up, or mashed. Make half your plate fruits and vegetables.     Grains: All foods made from grains are part of the Grains Group. These include wheat, rice, oats, cornmeal, and barley such as bread, pasta, oatmeal, cereal, tortillas, and grits. Grains should be no more  than a quarter of your plate. At least half of your grains should be whole grains.    Protein: This group includes meat, poultry, seafood, beans and peas, eggs, processed soy products (like tofu), nuts (including nut butters), and seeds. Make protein choices no more than a quarter of your plate. Meat and poultry choices should be lean or low fat.    Dairy: All fluid milk products and foods made from milk that contain calcium, like yogurt and cheese are part of the Dairy Group. (Foods that have little calcium, such as cream, butter, and cream cheese, are not part of the group.) Most dairy choices should be low-fat or fat-free.    Oils: These are fats that are liquid at room temperature. They include canola, corn, olive, soybean, and sunflower oil. Foods that are mainly oil include mayonnaise, certain salad dressings, and soft margarines. You should have only 5 to 7 teaspoons of oils a day. You probably already get this much from the food you eat.  Use [employer] to Help Build Your Meals  The [employer] can help you plan and track your meals and activity. You can look up individual foods to see or compare their nutritional value. You can get guidelines for what and how much you should eat. You can compare your food choices. And you can assess personal physical activities and see ways you can improve. Go to www.BeautyTicket.com.gov/supertracker/.    5612-1750 [employer]. [address] [place] [address]. All rights reserved. This information is not intended as a substitute for professional medical care. Always follow your healthcare professional's instructions.             Advance Directive  Patients advance directive was discussed and I am comfortable with the patients wishes.  Patient Education   Personalized Prevention Plan  You are due for the preventive services outlined below.  Your care team is available to assist you in scheduling these services.  If you have already completed any of  these items, please share that information with your care team to update in your medical record.  Health Maintenance   Topic Date Due   ? ZOSTER VACCINES (1 of 2) 02/05/1986   ? DXA SCAN  02/05/2001   ? INFLUENZA VACCINE RULE BASED (1) 08/01/2019   ? MEDICARE ANNUAL WELLNESS VISIT  08/27/2019   ? TD 18+ HE  12/01/2019   ? FALL RISK ASSESSMENT  08/29/2020   ? ADVANCE CARE PLANNING  08/27/2023   ? PNEUMOCOCCAL POLYSACCHARIDE VACCINE AGE 65 AND OVER  Completed   ? PNEUMOCOCCAL CONJUGATE VACCINE FOR ADULTS (PCV13 OR PREVNAR)  Completed

## 2021-06-18 NOTE — PATIENT INSTRUCTIONS - HE
Patient Instructions by Oriana Tinajero CMA at 9/1/2020 11:00 AM     Author: Oriana Tinajero CMA Service: -- Author Type: Certified Medical Assistant    Filed: 9/1/2020 11:37 AM Encounter Date: 9/1/2020 Status: Addendum    : Bryan Way MD (Physician)    Related Notes: Original Note by Bryan Way MD (Physician) filed at 9/1/2020 11:36 AM       Annual wellness visit for Mrs. Valdes who is 85 years old and is overall doing quite well, really no new problems since our previous visit of April 29, 2020  Overall stable, maintaining her mobility, medication regimen reviewed and looks very reasonable.      Advanced lumbar spondylosis and central canal stenosis with Neurogenic claudication and right lower extremity radiculopathic symptoms with subtle findings of distal muscle weakness; continuing with conservative management strategy.     I told her that she might benefit from use of a Swiss rubber ball, which might help her do core muscle strengthening exercises for her lower back and abdominals.  I told her that a physical therapist could demonstrate those techniques for her, and also recommend the appropriate size rubber ball.    She describes pain in both buttocks, going into both lower extremities, right worse than left.  She is able to sleep comfortably, but the pain and stiffness are worse when she first gets out of bed.  They get a little bit better once she has become more active over the course of the morning, but she will have pain if she stands in place.  Sitting down gives her relief.     EXAM: MR LUMBAR SPINE WO CONTRAST  LOCATION: [facility]  DATE/TIME: 2/11/2020 2:24 PM     IMPRESSION:   1.  Multilevel degenerative disc disease of the lumbar spine with symmetric disc bulges, most pronounced at the L4/L5 level where there is severe spinal canal and bilateral lateral recess narrowing (AP diameter the spinal canal measuring 3 mm).  2.  Advanced bilateral facet arthropathy and  bilateral facet joint effusions at L4/L5. Flexion and extension views could be performed to evaluate for dynamic instability.  3.  Multilevel posterolateral disc disease and facet arthropathy with multilevel neural foraminal narrowing, most pronounced at the L4/L5 level where there is moderate to severe right and moderate left neural foraminal narrowing.     I told her that as long as she is able to maintain good mobility, without falling, pain is not disabling, and not having problems with bowel and bladder--as long as all that is true, then she can continue with a conservative management strategy of exercise and maximizing mobility.     When she met with the spine clinic on January 31, 2020, she declined trial of gabapentin.  Neurosurgery consultation March 5, 2020, where surgical options were presented to her.  Also discussion about epidural steroid injection, which she has declined thus far.  Follow-up telephone visit with neurosurgery Dr. Mathew March 19, 2020, plan to continue with conservative management.     Cervical spondylosis, with central canal stenosis at C6-C7 level, with mild compression of the cervical spinal cord, but no evidence of myelomalacia by MRI March 5, 2020     EXAM: MR CERVICAL SPINE WO CONTRAST  LOCATION: [facility]  DATE/TIME: 3/17/2020 1:46 PM  1.  Multilevel degenerative disc disease of the cervical spine with disc osteophyte complexes, most pronounced at the C5/C6 level where there is moderate to severe spinal canal narrowing and compression of the cervical spinal cord and at the C6/C7 level   where there is moderate spinal canal narrowing and mild compression of the cervical spinal cord. No definite evidence of edema or myelomalacia at these levels.  2.  Multilevel uncovertebral joint disease and facet arthropathy with severe multilevel neural foraminal narrowing at C3/C4-C6/C7 as described.     Hypertension with elevated systolic component, which I believe is probably from  arterial compliance, I think is on a reasonable blood pressure regime; BUN and creatinine normal when measured on October 23, 2019.    Blood pressure is pretty good according to her home machine.    She takes only half a tablet of the losartan 50/hydrochlorothiazide 12.5.  I would like to see her systolic pressure consistently less than 140.  If we need to escalate blood pressure medication, then I would have her take the full tablet of losartan hydrochlorothiazide.    BP Readings from Last 3 Encounters:   09/01/20 172/70   03/05/20 149/74   01/31/20 143/66     Current medications are losartan hydrochlorothiazide as a fixed dose combination pill and also she takes Cartia XT for supraventricular tachycardia suppression, but that also has blood pressure lowering effects.     We will check metabolic panel today.     History of benign monoclonal gammopathy.    Serum protein electrophoresis from specimen drawn October 23, 2019 showed no monoclonal protein.  I think we can stop checking.     Hyperlipidemia on treatment.  Lipid profile looks great when checked August 29, 2019.  Continue on atorvastatin 10 milligrams a day.     Supraventricular tachycardia on Cartia XT for the last 2 years.  She only gets occasional brief fluttering episodes, nothing sustained.     Menopause with history of previous left oophrectomy.  Uterus is still in place.     History of adenomatous colon polyp.  Last colonoscopy was October 2015, and she has stopped colon screening.  The October 2015 colonoscopy showed diverticulosis, no polyps.  She can stop colon screening at this point.     Vulvar lichen sclerosus, for which she uses topical steroid ointment with good effect.    Ear wax on the left (canal is somewhat narrow), for which she could use an over-the-counter wax dissolving eardrop, example brands Debrox or Murine. She can find these in the ear care section at a pharmacy.      I reminded her to get her double strength influenza vaccine.   Also she might consider getting the 2 shot shingles vaccine at a community pharmacy under her Medicare prescription drug benefit.  She has received Prevnar 13 and pneumococcal 23 polysaccharide already.      Immunization History   Administered Date(s) Administered   ? Influenza high dose,seasonal,PF, 65+ yrs 10/19/2015, 10/03/2016, 10/17/2017, 10/02/2018, 10/23/2019   ? Influenza, inj, historic,unspecified 11/06/2007, 10/31/2008, 11/25/2009, 10/20/2010, 11/18/2011   ? Influenza, seasonal,quad inj 6-35 mos 11/14/2012, 11/15/2013, 10/10/2014   ? Pneumo Conj 13-V (2010&after) 04/22/2016   ? Pneumo Polysac 23-V 10/30/2002, 12/28/2007   ? Td,adult,historic,unspecified 12/01/2009   ? Tdap 12/01/2009         Patient Education   Signs of Hearing Loss  Hearing loss is a problem shared by many people. In fact, it is one of the most common health conditions, particularly as people age. Most people over age 65 have some hearing loss, and by age 80, almost everyone does. Because hearing loss usually occurs slowly over the years, you may not realize your hearing ability has gotten worse.       Have your hearing checked  Contact your [facility] care provider if you:    Have to strain to hear normal conversation.    Have to watch other peoples faces very carefully to follow what theyre saying.    Need to ask people to repeat what theyve said.    Often misunderstand what people are saying.    Turn the volume of the television or radio up so high that others complain.    Feel that people are mumbling when theyre talking to you.    Find that the effort to hear leaves you feeling tired and irritated.    Notice, when using the phone, that you hear better with 1 ear than the other.    3830-7178 [employer]. [address] [place] [address], [place] [address]. All rights reserved. This information is not intended as a substitute for professional medical care. Always follow your healthcare professional's instructions.           Advance  Directive  Patients advance directive was discussed and I am comfortable with the patients wishes.  Patient Education   Personalized Prevention Plan  You are due for the preventive services outlined below.  Your care team is available to assist you in scheduling these services.  If you have already completed any of these items, please share that information with your care team to update in your medical record.  Health Maintenance   Topic Date Due   ? ZOSTER VACCINES (1 of 2) 02/05/1986   ? DXA SCAN  02/05/2001   ? TD 18+ HE  12/01/2019   ? INFLUENZA VACCINE RULE BASED (1) 08/01/2020   ? FALL RISK ASSESSMENT  08/29/2020   ? MEDICARE ANNUAL WELLNESS VISIT  08/29/2020   ? ADVANCE CARE PLANNING  08/29/2024   ? PNEUMOCOCCAL IMMUNIZATION 65+ LOW/MEDIUM RISK  Completed   ? HEPATITIS B VACCINES  Aged Out

## 2021-06-19 NOTE — LETTER
"Letter by Bryan Way MD at      Author: Bryan Way MD Service: -- Author Type: --    Filed:  Encounter Date: 10/25/2019 Status: Signed         Kim Valdes  [address] [facility] [address]             October 25, 2019         Dear Ms. Valdes,    Below are the results from your recent visit:    Resulted Orders   Basic Metabolic Panel   Result Value Ref Range    Sodium 144 136 - 145 mmol/L    Potassium 3.9 3.5 - 5.0 mmol/L    Chloride 105 98 - 107 mmol/L    CO2 28 22 - 31 mmol/L    Anion Gap, Calculation 11 5 - 18 mmol/L    Glucose 85 70 - 125 mg/dL    Calcium 9.8 8.5 - 10.5 mg/dL    BUN 21 8 - 28 mg/dL    Creatinine 0.93 0.60 - 1.10 mg/dL    GFR MDRD Af Amer >60 >60 mL/min/1.73m2    GFR MDRD Non Af Amer 58 (L) >60 mL/min/1.73m2    Narrative    Fasting Glucose reference range is 70-99 mg/dL per  American Diabetes Association (ADA) guidelines.   Electrophoresis, Protein, Serum, [place]   Result Value Ref Range    Albumin % 59.7 51.0 - 67.0 %    Albumin  4.5 3.2 - 4.7 g/dL    Alpha 1 % 2.4 2.0 - 4.0 %    Alpha 1 0.2 0.1 - 0.3 g/dL    Alpha 2 % 9.4 5.0 - 13.0 %    Alpha 2 0.7 0.4 - 0.9 g/dL    % Beta 11.2 10.0 - 17.0 %    Beta 0.8 0.7 - 1.2 g/dL    Gamma Globulin % 17.3 9.0 - 20.0 %    Gamma Globulin 1.3 0.6 - 1.4 g/dL    ELP Comment Unremarkable protein electrophoresis.       Protein, Total 7.5 6.0 - 8.0 g/dL    Path ICD: D47.2     Interpreted By: Cong Haque MD    Thyroid [place]   Result Value Ref Range    TSH 1.15 0.30 - 5.00 uIU/mL        Enclosed are your test results from your recent clinic visit with me.  I was pleased with results.  The  serum protein electrophoresis showed no monoclonal protein.  I do not believe that you have  monoclonal gammopathy, so I have removed that condition from your problem list.      Your kidney function actually came back just about normal.  Note that the creatinine is within normal  limits.  TSH thyroid test was normal.\"    Please call " with questions or contact us using [employer].    Sincerely,        Electronically signed by Bryan Way MD

## 2021-06-19 NOTE — LETTER
Letter by Greg Rodriguez MD at      Author: Greg Rodriguez MD Service: -- Author Type: --    Filed:  Encounter Date: 8/29/2019 Status: (Other)         Kim Valdes  [address] [facility] [address]             August 29, 2019         Dear Ms. Valdes,    Below are the results from your recent visit:    Resulted Orders   Lipid Cascade   Result Value Ref Range    Cholesterol 163 <=199 mg/dL    Triglycerides 89 <=149 mg/dL    HDL Cholesterol 62 >=50 mg/dL    LDL Calculated 83 <=129 mg/dL    Patient Fasting > 8hrs? Yes    HM2(CBC w/o Differential)   Result Value Ref Range    WBC 4.1 4.0 - 11.0 thou/uL    RBC 4.43 3.80 - 5.40 mill/uL    Hemoglobin 14.4 12.0 - 16.0 g/dL    Hematocrit 42.3 35.0 - 47.0 %    MCV 96 80 - 100 fL    MCH 32.6 27.0 - 34.0 pg    MCHC 34.1 32.0 - 36.0 g/dL    RDW 12.9 11.0 - 14.5 %    Platelets 164 140 - 440 thou/uL    MPV 8.2 7.0 - 10.0 fL   Comprehensive Metabolic Panel   Result Value Ref Range    Sodium 141 136 - 145 mmol/L    Potassium 4.4 3.5 - 5.0 mmol/L    Chloride 104 98 - 107 mmol/L    CO2 28 22 - 31 mmol/L    Anion Gap, Calculation 9 5 - 18 mmol/L    Glucose 101 70 - 125 mg/dL    BUN 29 (H) 8 - 28 mg/dL    Creatinine 1.23 (H) 0.60 - 1.10 mg/dL    GFR MDRD Af Amer 51 (L) >60 mL/min/1.73m2    GFR MDRD Non Af Amer 42 (L) >60 mL/min/1.73m2    Bilirubin, Total 0.7 0.0 - 1.0 mg/dL    Calcium 9.9 8.5 - 10.5 mg/dL    Protein, Total 7.6 6.0 - 8.0 g/dL    Albumin 3.9 3.5 - 5.0 g/dL    Alkaline Phosphatase 68 45 - 120 U/L    AST 25 0 - 40 U/L    ALT 18 0 - 45 U/L    Narrative    Fasting Glucose reference range is 70-99 mg/dL per  American Diabetes Association (ADA) guidelines.       All labs look excellent    Please call with questions or contact us using [employer].    Sincerely,        Electronically signed by Greg Rodriguez MD

## 2021-06-19 NOTE — LETTER
Letter by Greg Rodriguez MD at      Author: Greg Rodriguez MD Service: -- Author Type: --    Filed:  Encounter Date: 4/11/2019 Status: (Other)         Kim Valdes  [address] [facility] [address]             April 11, 2019         Dear Ms. Valdes,    Below are the results from your recent visit:    Resulted Orders   Basic Metabolic Panel   Result Value Ref Range    Sodium 139 136 - 145 mmol/L    Potassium 4.2 3.5 - 5.0 mmol/L    Chloride 102 98 - 107 mmol/L    CO2 27 22 - 31 mmol/L    Anion Gap, Calculation 10 5 - 18 mmol/L    Glucose 101 70 - 125 mg/dL    Calcium 10.1 8.5 - 10.5 mg/dL    BUN 28 8 - 28 mg/dL    Creatinine 1.26 (H) 0.60 - 1.10 mg/dL    GFR MDRD Af Amer 49 (L) >60 mL/min/1.73m2    GFR MDRD Non Af Amer 41 (L) >60 mL/min/1.73m2    Narrative    Fasting Glucose reference range is 70-99 mg/dL per  American Diabetes Association (ADA) guidelines.   HM2(CBC w/o Differential)   Result Value Ref Range    WBC 5.5 4.0 - 11.0 thou/uL    RBC 4.40 3.80 - 5.40 mill/uL    Hemoglobin 14.3 12.0 - 16.0 g/dL    Hematocrit 40.7 35.0 - 47.0 %    MCV 92 80 - 100 fL    MCH 32.4 27.0 - 34.0 pg    MCHC 35.1 32.0 - 36.0 g/dL    RDW 13.4 11.0 - 14.5 %    Platelets 246 140 - 440 thou/uL    MPV 8.8 7.0 - 10.0 fL       Preoperative labs stable    Please call with questions or contact us using [employer]ulyn[employer].    Sincerely,        Electronically signed by Greg Rodriguez MD

## 2021-06-20 NOTE — LETTER
Letter by Bryan Way MD at      Author: Bryan Way MD Service: -- Author Type: --    Filed:  Encounter Date: 1/30/2020 Status: (Other)         Kim Valdes  [address] [facility] [address]             January 30, 2020         Dear Ms. Valdes,    Below are the results from your recent visit:    Resulted Orders   XR Lumbar Spine 2 or 3 VWS    Narrative    EXAM: XR LUMBAR SPINE 2 OR 3 VWS  LOCATION: [facility]  DATE/TIME: 1/29/2020 10:38 AM    INDICATION: Spinal stenosis, lumbar region with neurogenic claudication.  COMPARISON: None.      Impression    There is no fracture. There is mild to moderate leftward curvature of the spine centered at L2. There is moderate loss of disc height at every lumbar level. There are moderate to large osteophytes at L1-L2 and L2-L3. There is severe facet   degenerative changes in the mid and lower lumbar spine.        Enclosed is the report of your lumbar spine x-ray.  Not surprisingly, you have a lot of degenerative  arthritis.  I believe the most appropriate course of action is intensive physical therapy, followed by a  home exercise program, with the goal of improving your core muscle strength and stability.  Having  stronger muscles will help alleviate pain.  That is why I referred you to the Spine [facility].     Please call with questions or contact us using [employer].    Sincerely,        Electronically signed by Bryan Way MD

## 2021-06-20 NOTE — LETTER
Letter by Bryan Way MD at      Author: Bryan Way MD Service: -- Author Type: --    Filed:  Encounter Date: 9/2/2020 Status: (Other)         Kim Valdes  [address] [facility] [address]             September 2, 2020         Dear Ms. Valdes,    Below are the results from your recent visit:  Your test results look good.  Kidney function is borderline low normal, but I am not worried by that.  Cholesterol numbers well controlled.  Blood cell counts normal.  Metabolic panel including liver function, electrolytes, and glucose normal.    Resulted Orders   Comprehensive Metabolic Panel   Result Value Ref Range    Sodium 141 136 - 145 mmol/L    Potassium 4.1 3.5 - 5.0 mmol/L    Chloride 105 98 - 107 mmol/L    CO2 26 22 - 31 mmol/L    Anion Gap, Calculation 10 5 - 18 mmol/L    Glucose 96 70 - 125 mg/dL    BUN 22 8 - 28 mg/dL    Creatinine 1.10 0.60 - 1.10 mg/dL    GFR MDRD Af Amer 57 (L) >60 mL/min/1.73m2    GFR MDRD Non Af Amer 47 (L) >60 mL/min/1.73m2    Bilirubin, Total 0.7 0.0 - 1.0 mg/dL    Calcium 9.6 8.5 - 10.5 mg/dL    Protein, Total 7.7 6.0 - 8.0 g/dL    Albumin 4.1 3.5 - 5.0 g/dL    Alkaline Phosphatase 70 45 - 120 U/L    AST 23 0 - 40 U/L    ALT 14 0 - 45 U/L    Narrative    Fasting Glucose reference range is 70-99 mg/dL per  American Diabetes Association (ADA) guidelines.   HM2(CBC w/o Differential)   Result Value Ref Range    WBC 4.7 4.0 - 11.0 thou/uL    RBC 4.49 3.80 - 5.40 mill/uL    Hemoglobin 15.0 12.0 - 16.0 g/dL    Hematocrit 43.9 35.0 - 47.0 %    MCV 98 80 - 100 fL    MCH 33.4 27.0 - 34.0 pg    MCHC 34.2 32.0 - 36.0 g/dL    RDW 12.7 11.0 - 14.5 %    Platelets 189 140 - 440 thou/uL    MPV 8.6 7.0 - 10.0 fL   Lipid Cascade   Result Value Ref Range    Cholesterol 163 <=199 mg/dL    Triglycerides 93 <=149 mg/dL    HDL Cholesterol 60 >=50 mg/dL    LDL Calculated 84 <=129 mg/dL    Patient Fasting > 8hrs? Yes          Please call with questions or contact us using  [employer].    Sincerely,        Electronically signed by Bryan Way MD

## 2021-06-20 NOTE — PROGRESS NOTES
"  Office Visit - Physical    Kim Valdes   83 y.o. female    Date of Visit: 8/27/2018    Chief Complaint   Patient presents with     Annual Wellness Visit     Pt is fasting       Subjective: This is a pleasant 83-year-old patient here for annual wellness exam.  History includes hyperlipidemia symptomatic menopause history of cholecystitis with previous cholecystectomy and benign monoclonal gammopathy as well as hypertension and intermittent supraventricular tachycardia    Has been maintained on low-dose diltiazem which is helpful for hypertension in addition to preventing episodes of SVT.  She generally has one episode a year which lasts only a few minutes.  She has not had any recent cardiac symptoms or concerns    Wellness information including cognitive screening health risk assessment mood assessment and advanced directives are reviewed and unremarkable.  Reviewed her desires regarding resuscitation and advanced directives she will maintain \"full code\" barring any irreversible medical condition these respects will be reviewed and respected and will have ongoing discussion    Personal social and family history reviewed no changes    Generally she feels well without complaints    ROS: A comprehensive review of systems was performed and was otherwise negative except as mentioned above.    Exam   Alert and oriented normal mental status head and neck negative EENT unremarkable lungs clear heart normal breasts negative head and neck negative abdomen benign extremities normal neuro exam negative  /80  Pulse 80  Ht 5' 6\" (1.676 m)  Wt 131 lb (59.4 kg)  BMI 21.14 kg/m2    Assessment and Plan    Supraventricular tachycardia as noted no changes continue with diltiazem    Otherwise normal exam no major new concerns or complaints    Labs pending    Kim was seen today for annual wellness visit.    Diagnoses and all orders for this visit:    SVT (supraventricular tachycardia) (H)  -     HM2(CBC w/o " Differential); Future  -     Comprehensive Metabolic Panel; Future  -     Lipid Cascade; Future  -     HM2(CBC w/o Differential)  -     Comprehensive Metabolic Panel  -     Lipid Cascade    Routine general medical examination at a health care facility  -     HM2(CBC w/o Differential); Future  -     Comprehensive Metabolic Panel; Future  -     Lipid Cascade; Future  -     HM2(CBC w/o Differential)  -     Comprehensive Metabolic Panel  -     Lipid Cascade    Essential hypertension  -     HM2(CBC w/o Differential); Future  -     Comprehensive Metabolic Panel; Future  -     Lipid Cascade; Future  -     HM2(CBC w/o Differential)  -     Comprehensive Metabolic Panel  -     Lipid Cascade    Hypercholesteremia  -     HM2(CBC w/o Differential); Future  -     Comprehensive Metabolic Panel; Future  -     Lipid Cascade; Future  -     HM2(CBC w/o Differential)  -     Comprehensive Metabolic Panel  -     Lipid Cascade    Adenomatous polyp  -     HM2(CBC w/o Differential); Future  -     Comprehensive Metabolic Panel; Future  -     Lipid Cascade; Future  -     HM2(CBC w/o Differential)  -     Comprehensive Metabolic Panel  -     Lipid Cascade              Medications:   Prior to Admission medications    Medication Sig Start Date End Date Taking? Authorizing Provider   aspirin 81 MG EC tablet Take 81 mg by mouth every evening.   Yes PROVIDER, HISTORICAL   atorvastatin (LIPITOR) 10 MG tablet TAKE ONE TABLET BY MOUTH ONCE DAILY AT BEDTIME 6/20/17  Yes Greg Rodriguez MD   diltiazem (CARDIZEM CD) 120 MG 24 hr capsule TAKE ONE CAPSULE (120MG TOTAL) BY MOUTH ONCE DAILY 10/22/17  Yes Greg Rodriguez MD   losartan-hydrochlorothiazide (HYZAAR) 50-12.5 mg per tablet TAKE ONE TABLET BY MOUTH ONCE DAILY 3/24/18  Yes Greg Rodriguez MD   estradiol (ESTRACE) 0.01 % (0.1 mg/gram) vaginal cream Insert 2 g into the vagina every 14 (fourteen) days.   8/27/18  PROVIDER, HISTORICAL       Allergies:  Allergies   Allergen Reactions     Omeprazole         Immunizations:   Immunization History   Administered Date(s) Administered     Influenza high dose, seasonal 10/19/2015, 10/03/2016, 10/17/2017     Influenza, inj, historic,unspecified 11/06/2007, 10/31/2008, 11/25/2009, 10/20/2010, 11/18/2011     Influenza, seasonal,quad inj 6-35 mos 11/14/2012, 11/15/2013, 10/10/2014     Pneumo Conj 13-V (2010&after) 04/22/2016     Pneumo Polysac 23-V 10/30/2002, 12/28/2007     Td,adult,historic,unspecified 12/01/2009     Tdap 12/01/2009       Past Medical History: No past medical history on file.    Past Surgical History: No past surgical history on file.    Family History:   Family History   Problem Relation Age of Onset     Heart attack Father      Stroke Mother        Social History:   Social History     Social History     Marital status:      Spouse name: N/A     Number of children: N/A     Years of education: N/A     Occupational History     Not on file.     Social History Main Topics     Smoking status: Never Smoker     Smokeless tobacco: Never Used     Alcohol use Not on file     Drug use: Not on file     Sexual activity: Not on file     Other Topics Concern     Not on file     Social History Narrative         Greg Rordiguez MD      Assessment and Plan:         The patient's current medical problems were reviewed.      The following health maintenance schedule was reviewed with the patient and provided in printed form in the after visit summary:   Health Maintenance   Topic Date Due     ZOSTER VACCINE  02/05/1996     DXA SCAN  02/05/2001     INFLUENZA VACCINE RULE BASED (1) 08/01/2018     FALL RISK ASSESSMENT  08/27/2019     TD 18+ HE  12/01/2019     ADVANCE DIRECTIVES DISCUSSED WITH PATIENT  08/21/2022     PNEUMOCOCCAL POLYSACCHARIDE VACCINE AGE 65 AND OVER  Completed     PNEUMOCOCCAL CONJUGATE VACCINE FOR ADULTS (PCV13 OR PREVNAR)  Completed        Subjective:   Chief Complaint: Kim Valdes is an 83 y.o. female here for an Annual Wellness visit.   HPI:   "  Review of Systems:    Please see above.  The rest of the review of systems are negative for all systems.    Patient Care Team:  Greg Rodriguez MD as PCP - General  Hugh Leigh DPM (General Surgery)     Patient Active Problem List   Diagnosis     Symptomatic Menopause     Hypercholesteremia     Dyspepsia     Cholecystitis     Serum Enzyme Levels - ALT (SGPT) Elevated     Cough     Benign Monoclonal Hypergammaglobulinemia     Diverticulosis     Adenomatous polyp     Pleurodynia     SVT (supraventricular tachycardia) (H)     Essential hypertension     Shingles     Palpitations     Low back ache     No past medical history on file.   No past surgical history on file.   Family History   Problem Relation Age of Onset     Heart attack Father      Stroke Mother       Social History     Social History     Marital status:      Spouse name: N/A     Number of children: N/A     Years of education: N/A     Occupational History     Not on file.     Social History Main Topics     Smoking status: Never Smoker     Smokeless tobacco: Never Used     Alcohol use Not on file     Drug use: Not on file     Sexual activity: Not on file     Other Topics Concern     Not on file     Social History Narrative      Current Outpatient Prescriptions   Medication Sig Dispense Refill     aspirin 81 MG EC tablet Take 81 mg by mouth every evening.       atorvastatin (LIPITOR) 10 MG tablet TAKE ONE TABLET BY MOUTH ONCE DAILY AT BEDTIME 90 tablet 3     diltiazem (CARDIZEM CD) 120 MG 24 hr capsule TAKE ONE CAPSULE (120MG TOTAL) BY MOUTH ONCE DAILY 90 capsule 3     losartan-hydrochlorothiazide (HYZAAR) 50-12.5 mg per tablet TAKE ONE TABLET BY MOUTH ONCE DAILY 90 tablet 1     No current facility-administered medications for this visit.       Objective:   Vital Signs:   Visit Vitals     /80     Pulse 80     Ht 5' 6\" (1.676 m)     Wt 131 lb (59.4 kg)     BMI 21.14 kg/m2        VisionScreening:  No exam data present     PHYSICAL " EXAM      Assessment Results 8/27/2018   Activities of Daily Living No help needed   Instrumental Activities of Daily Living No help needed   Get Up and Go Score Less than 12 seconds   Mini Cog Total Score 5   Some recent data might be hidden     A Mini-Cog score of 0-2 suggests the possibility of dementia, score of 3-5 suggests no dementia    Identified Health Risks:     The patient was counseled and encouraged to consider modifying their diet and eating habits. She was provided with information on recommended healthy diet options.  The patient was provided with written information regarding signs of hearing loss.  Patient's advanced directive was discussed and e patient's wishes.

## 2021-06-24 NOTE — PROGRESS NOTES
"Office Visit - Follow up    Kim Valdes   84 y.o. female    Date of Visit: 3/12/2019    Chief Complaint   Patient presents with     Leg Pain     Right buttocks pain down right leg for 1 month       Subjective: Maged is here primarily with concerns regarding right leg pain.  Has noted mild tingling in the toes of both feet at night when lying in bed.  This is typically in a supine position.    When she gets up in the morning this resolves however also in the morning she develops pain in the right gluteal region that radiates down the back of the right leg.  This is made worse by movement primarily flexion of the leg.  Over the course of the morning the pain diminishes and during the afternoon and evening she has mild calf tenderness.  Has not noted any muscle weakness.  Has had no recent trauma or injury.  Denies specific neurologic symptoms        ROS: A comprehensive review of systems was performed and was otherwise negative except as mentioned above.     Exam  Exam of the right leg is normal pulses are normal musculature is normal with normal muscle tone reflexes normal both lower extremities negative neuro exam   /60 (Patient Site: Left Arm, Patient Position: Sitting)   Pulse 62   Ht 5' 6\" (1.676 m)   Wt 131 lb (59.4 kg)   BMI 21.14 kg/m      Assessment and Plan  Suspect mild spinal stenosis with some tingling which is positional in both toes in addition to \"sciatica\" or mild lumbar radiculopathy in the S1 dermatome.  Will treat with flexion exercise program in addition to naproxen 500 mg twice daily for 30 days.  Follow-up if not improved    Kim was seen today for leg pain.    Diagnoses and all orders for this visit:    Sciatica of right side    Other orders  -     naproxen (NAPROSYN) 500 MG tablet; Take 1 tablet (500 mg total) by mouth 2 (two) times a day with meals.          Time: total time spent with the patient was 20 minutes of which >50% was spent in counseling and coordination of " care        Allergies   Allergen Reactions     Omeprazole        Medications :  Prior to Admission medications    Medication Sig Start Date End Date Taking? Authorizing Provider   aspirin 81 MG EC tablet Take 81 mg by mouth every evening.   Yes PROVIDER, HISTORICAL   atorvastatin (LIPITOR) 10 MG tablet Take 1 tablet (10 mg total) by mouth at bedtime. 9/30/18  Yes Greg Rodriguez MD   CARTIA  mg 24 hr capsule TAKE ONE CAPSULE BY MOUTH ONCE DAILY 10/16/18  Yes Greg Rodriguez MD   losartan-hydrochlorothiazide (HYZAAR) 50-12.5 mg per tablet Take 1 tablet by mouth daily. 9/21/18  Yes Greg Rodriguez MD   naproxen (NAPROSYN) 500 MG tablet Take 1 tablet (500 mg total) by mouth 2 (two) times a day with meals. 3/12/19   Greg Rodriguez MD        Past Medical History: No past medical history on file.    Past Surgical History: No past surgical history on file.    Social History:   Social History     Socioeconomic History     Marital status:      Spouse name: Not on file     Number of children: Not on file     Years of education: Not on file     Highest education level: Not on file   Occupational History     Not on file   Social Needs     Financial resource strain: Not on file     Food insecurity:     Worry: Not on file     Inability: Not on file     Transportation needs:     Medical: Not on file     Non-medical: Not on file   Tobacco Use     Smoking status: Never Smoker     Smokeless tobacco: Never Used   Substance and Sexual Activity     Alcohol use: Not on file     Drug use: Not on file     Sexual activity: Not on file   Lifestyle     Physical activity:     Days per week: Not on file     Minutes per session: Not on file     Stress: Not on file   Relationships     Social connections:     Talks on phone: Not on file     Gets together: Not on file     Attends Orthodoxy service: Not on file     Active member of club or organization: Not on file     Attends meetings of clubs or organizations: Not on file      Relationship status: Not on file     Intimate partner violence:     Fear of current or ex partner: Not on file     Emotionally abused: Not on file     Physically abused: Not on file     Forced sexual activity: Not on file   Other Topics Concern     Not on file   Social History Narrative     Not on file       Family History:   Family History   Problem Relation Age of Onset     Heart attack Father      Stroke Mother          Greg Rodriguez MD

## 2021-06-26 ENCOUNTER — HEALTH MAINTENANCE LETTER (OUTPATIENT)
Age: 86
End: 2021-06-26

## 2021-06-29 NOTE — PROGRESS NOTES
Progress Notes by Emely Nogueira at 9/21/2020  2:00 PM     Author: Emely Nogueira Service: -- Author Type: Physical Therapist    Filed: 9/21/2020  4:11 PM Encounter Date: 9/21/2020 Status: Attested    : Emely Noguiera (Physical Therapist) Cosigner: Bryan Way MD at 9/21/2020  4:21 PM    Attestation signed by Bryan Way MD at 9/21/2020  4:21 PM    Physician recommendation:   Follow therapist's recommendation                    [facility]  [facility]-Pelvic Initial Evaluation        [facility] Certification Request    September 21, 2020      Patient: Kim Valdes  MR Number: [id number]  YOB: 1935  Date of Visit: 9/21/2020      Dear Dr. Way    Thank you for this referral.   We are seeing Kim Valdes for Physical Therapy of back pain.    Medicare and/or Medicaid requires physician review and approval of the treatment plan. Please review the plan of care and verify that you agree with the therapy plan of care by co-signing this note.    If you have any questions or concerns, please don't hesitate to call.    Sincerely,      Khushbu Nogueira        Physician recommendation:     ___ Follow therapist's recommendation        ___ Modify therapy      *Physician co-signature indicates they certify the need for these services furnished within this plan and while under their care.      Patient Name: Kim Valdes  Date of evaluation: 9/21/2020  Referral Diagnosis: Spinal stenosis of lumbar region with neurogenic claudication  Referring provider: Bryan Way MD  Visit Diagnosis:     ICD-10-CM    1. Chronic bilateral low back pain without sciatica  M54.5     G89.29    2. Weakness of trunk musculature  M62.81    3. Generalized muscle weakness  M62.81        Assessment:       Patient presents with chronic low back pain with advanced lumbar spondylosis and central canal stenosis with Neurogenic claudication and right lower extremity  radiculopathic symptoms.  She went in for a physical and would like some more exercises for her HEP and perhaps seeing if she could use an exercise ball.  She demonstrates core and leg weakness, and slight pain to palpation.  Functional limitations are described as occurring with: standing, walking, getting out of bed, lifting, bending.  Pt. is appropriate and a good candidate for skilled PT intervention as outlined in the Plan of Care (POC) to improve pain levels and function.  There was enough time to inrtoduce the exercise ball to the patient and have her sit on it, but not enough time to add new exercises today.  Will add those next visit    Goals:  No data recorded  Pt will: Able to learn a few more strengthening exercises for her HEP to help manage symptoms within 8-12 visits      Patient's expectations/goals are realistic.    Barriers to Learning or Achieving Goals:  Patient Active Problem List   Diagnosis   ? Symptomatic Menopause   ? Hypercholesteremia   ? Adenomatous polyp   ? SVT (supraventricular tachycardia) (H)   ? Essential hypertension   ? Renal insufficiency   ? Spinal stenosis of lumbar region with neurogenic claudication   ? Cervical stenosis of spinal canal     POC, goals and pathology of condition were reviewed with the patient.  Patient is in agreement with the POC.       Plan / Patient Instructions:        Plan of Care:   Authorization / Certification Start Date: 09/21/20  Authorization / Certification End Date: 12/18/20  Authorization / Certification Number of Visits: 8-12  Communication with: Referral Source  Patient Related Instruction: Nature of Condition;Treatment plan and rationale;Self Care instruction;Basis of treatment  Times per Week: 1-2  Number of Weeks: 8-12  Number of Visits: 8-12, prn  Therapeutic Exercise: ROM;Stretching;Strengthening  Neuromuscular Reeducation: posture;core  Manual Therapy: soft tissue mobilization  Modalities: TENS      Plan for next visit:   Add exercise  ball exercises:  Stretches and strengthening    Pt's , Lon, was present throughout session     Subjective:         History of Present Illness:    Kim is a 85 y.o. female who presents to therapy today with complaints of chronic low back.  She went in for a well visit and would like a refresher on her exercises and to see if an exercise ball would be helpful.  She was recently in physical therapy and discharged June 2020.  She has Advanced lumbar spondylosis and central canal stenosis with Neurogenic claudication and right lower extremity radiculopathic symptoms with subtle findings of distal muscle weakness.  Symptoms are constant and getting better.      Pain Rating:3  Pain rating at best: 1  Pain rating at worst: 8  Pain description: aching, pain and tingling    Functional limitations are described as occurring with:   standing, walking, getting out of bed, lifting, bending    Patient reports benefit from:  heat, TENS, stretches         Objective:      Note: Items left blank indicates the item was not performed or not indicated at the time of the evaluation.    Patient Outcome Measures :    APTA score: 11  Modified Oswestry Low Back Pain Disablity Questionnaire  in %: 22      Examination  1. Chronic bilateral low back pain without sciatica     2. Weakness of trunk musculature     3. Generalized muscle weakness       Involved side: Right >Left  Posture Observation:   General standing posture is moderate+ thoracic kyphosis and forward head, left shoulder complex lower,      Lumbar ROM:      Within Normal Limits unless noted  Date: 09/21/20     *Indicate scale AROM AROM AROM   Lumbar Flexion Hands to mid shins, pull in right posterior thigh     Lumbar Extension       Right Left Right Left Right Left   Lumbar Sidebending         Lumbar Rotation         Thoracic Flexion      Thoracic Extension      Thoracic Sidebending         Thoracic Rotation             Lower Extremity Strength:   Normal unless  noted  Date: 09/21/20     LE strength/5 Right Left Right Left Right Left   Hip Flexion (L1-3) 5- 5-       Hip Extension (L5-S1)         Hip Abduction (L4-5) 5- 5-       Hip Adduction (L2-3)         Hip External Rotation         Hip Internal Rotation         Knee Extension (L3-4) 5- 5-       Knee Flexion 5- 5-       Ankle Dorsiflexion (L4-5)         Great Toe Extension (L5)         Ankle Plantar flexion (S1)         Abdominals          Sensation     Intact to light touch      Reflex Testing  Lumbar Dermatomes Right Left UE Reflexes Right Left   Iliac Crest and Groin (L1)   Biceps (C5-6)     Anterior Medial Thigh (L2)   Brachioradialis (C5-6)     Anterior Thigh, Medial Epicondyle Femur (L3)   Triceps (C7-8)     Lateral Thigh, Anterior Knee, Medial Leg/Malleolus (L4)   Hoffmanns test     Lateral Leg, Dorsal Foot (L5)   LE Reflexes     Lateral Foot (S1)   Patellar (L3-4)     Posterior Leg (S2)   Achilles (S1-2)     Other:   Babinski Response       Palpation: slight pain over right lumbar paraspinals at L4-L5, right piriformis  Bridging: weakness demonstrated with quality of task  1 leg stance:  L=5 seconds, R=4 seconds    Lumbar Special Tests:     Lumbar Special Tests Right Left SI Tests Right  Left   Quadrant test   SI Compression - -   Straight leg raise - - SI Distraction     Crossover response   POSH Test     Slump - - Sacral Thrust     Sit-up test  FADIR     Trunk extensor endurance test  Resisted Abduction     Prone instability test  Other:     Pubic shotgun  Other:         LE Screen:  hamstring flexibility fair    Treatment Today     TREATMENT MINUTES COMMENTS   Evaluation 20  Lumbar   Self-care/ Home management     Manual therapy     Neuromuscular Re-education     Therapeutic Activity     Therapeutic Exercises 40 See flow sheet or above  Review of previous exercises.  Intro to the exercise ball.  Edu pt on height of ball, sat and bounced on ball for 1 minute.  [id number]   Gait training      Modality__________________                Total 60    Blank areas are intentional and mean the treatment did not include these items.     PT Evaluation Code: (Please list factors)  Patient History/Comorbidities:   Patient Active Problem List   Diagnosis   ? Symptomatic Menopause   ? Hypercholesteremia   ? Adenomatous polyp   ? SVT (supraventricular tachycardia) (H)   ? Essential hypertension   ? Renal insufficiency   ? Spinal stenosis of lumbar region with neurogenic claudication   ? Cervical stenosis of spinal canal     Examination: pain to palpation, trunk and leg weakness  Clinical Presentation: stable  Clinical Decision Making: low    Patient History/  Comorbidities Examination  (body structures and functions, activity limitations, and/or participation restrictions) Clinical Presentation Clinical Decision Making (Complexity)   No documented Comorbidities or personal factors 1-2 Elements Stable and/or uncomplicated Low   1-2 documented comorbidities or personal factor 3 Elements Evolving clinical presentation with changing characteristics Moderate   3-4 documented comorbidities or personal factors 4 or more Unstable and unpredictable High Khushbu Nogueira, PT  9/21/2020  7:32 AM

## 2021-06-29 NOTE — PROGRESS NOTES
Progress Notes by Emely Nogueira at 6/3/2020 10:00 AM     Author: Emely Nogueira Service: -- Author Type: Physical Therapist    Filed: 6/3/2020 10:46 AM Encounter Date: 6/3/2020 Status: Attested    : Emely Nogueira (Physical Therapist) Cosigner: Bryan Way MD at 6/3/2020 10:59 AM    Attestation signed by Bryan Way MD at 6/3/2020 10:59 AM    Physician recommendation:   Follow therapist's recommendation    Bryan Way                      [facility] Re-Certification Request    Deyanira 3, 2020      Patient: Kim Valdes  MR Number: [id number]  YOB: 1935  Date of Visit: 6/3/2020  Onset Date:  2020  Date of Eval: 2/10/2020    Dear Dr. Way    As you may recall, we have been seeing Kim Valdes for Physical Therapy of back pain.    For therapy to continue, Medicare and/or Medicaid requires periodic physician review of the treatment plan. Please review the summary of the patient's progress and our plan for continued therapy, and verify  that you agree therapy should continue by entering certification dates at the bottom of this note and co-signing this note.    Due to Covid-19 pt did not feel comfortable returning to clinic until now.  Saw patient today to check in with her progress.  The original certification dates  so need this new re-certification signed to cover this visit and the POC for today.      If you have any questions or concerns, please don't hesitate to call.    Sincerely,      Khushbu MCNEAL Mirlande PT        Physician recommendation:     ___ Follow therapist's recommendation        ___ Modify therapy      *Physician co-signature indicates they certify the need for these services furnished within this plan and while under their care.      [facility] Daily Progress/Discharge     Patient Name: Kim Valdes  Date: 6/3/2020  Visit #:   POC Dates:  2/10/20-20, Medicare, [employer]BS  Referral Diagnosis:  Scoliosis of lumbar spine, unspecified scoliosis type  Referring provider: Sary Rosales PA-C  Visit Diagnosis:     ICD-10-CM    1. Lumbar facet arthropathy  M47.816    2. Generalized muscle weakness  M62.81    3. Lumbar radicular pain  M54.16          Assessment:   Patient feels she is at a good point with her exercises and feels comfortable performing them regularly.  She has gained strength and can manage her symptoms with the exercises and hot showers.      Goals:   Pt. will be independent with home exercise program in : 6 weeks  Goal met.  Performs them regularly    Pt will: Able to bend down to put shoes and socks on within 8-12 visits  Partially Met-uses a kitchen utensil as a sock aid to help put shoes and socks on.  The time of day affects how easy or hard it is to put them on.  Pt will: Able to decrease pain to 0-2/10 as she gets out of bed in the morning within 8-12 visits Partially met:  She feels this is different every day.  She is able to ease the symptoms as she moves around and with a hot shower  Pt will: Able to lift objects > 10# such as laundry within 8-12 visits not met  She feels lifting is getting harder and feels it is the back pain preventing her from doing it  Pt will: Able to squat and get back up with more ease as leg strength improves within 8-12 visits  Partially met   She tries to use her kitchen utensils more to help pick objects up so she doesn't have to squat    Updated: (6/3/2020)   Plan of Care:   Authorization / Re-Certification Start Date: 6/3/2020  Authorization / Certification End Date: 06/3/2020  Authorization / Certification Number of Visits: 1  Communication with: Referral Source  Patient Related Instruction: Nature of Condition;Treatment plan and rationale;Self Care instruction;Basis of treatment;Body mechanics;Posture  Times per Week: 1  Number of Weeks: 1  Number of Visits:1  Therapeutic Exercise: ROM;Stretching;Strengthening  Neuromuscular Reeducation:  ;posture;core      Plan / Patient Education:     Patient is discharged at this time.  Will contact her MD if symptoms change or get worse.    Subjective:     Pain Ratin -2    I still have pain when I wake in the AM.  The hot shower always feels better and will take some of the pain away.  Exercises are ok.  I stopped doing some of them.  Feet are always stinging.  I do have a foot MD so maybe I should go see him.  She saw the neurosurgeon and she does not want to do surgery.  She suggested an injection but don't think symptoms are that bad.  I am having a challenge lifting 5#.    Objective:       Modified Oswestry Low Back Pain Disablity Questionnaire  in %: 28      Was 18 on initial evaluation    Exercises:  Exercise #1: stretches: sitting hamstring, standing gastroc, supine piriformis, SKC, LTR  Comment #1:    hold 30 seconds x 1-3 reps  Exercise #2: tried LE supine nerve glides and supine hip ER but pt did not feel anything  or a stretch so did not issue  Comment #2: posterior pelvic tilt in supine and sitting    hold 5-10 seconds X 5-10 reps  Exercise #3: clamshells  X 15  Comment #3: bridges  hold 10 seconds,  X 6  Exercise #4: sit<>stand  3-5 reps  3-5 chairs  Comment #4: low abdominals:  supine, hip/knee 90 degrees, X 20      Treatment Today     TREATMENT MINUTES COMMENTS   Evaluation     Self-care/ Home management     Manual therapy     Neuromuscular Re-education     Therapeutic Activity     Therapeutic Exercises 39 See flow sheet or above  Review of all stretches and strengthening for HEP, minimal cues needed    Many questions     Gait training     Modality__________________     TENS           Total 39    Blank areas are intentional and mean the treatment did not include these items.       Khushbu Nogueira, PT  6/3/2020

## 2021-08-19 ENCOUNTER — TRANSFERRED RECORDS (OUTPATIENT)
Dept: HEALTH INFORMATION MANAGEMENT | Facility: CLINIC | Age: 86
End: 2021-08-19

## 2021-09-10 ENCOUNTER — TRANSFERRED RECORDS (OUTPATIENT)
Dept: HEALTH INFORMATION MANAGEMENT | Facility: CLINIC | Age: 86
End: 2021-09-10

## 2021-09-16 DIAGNOSIS — E78.5 HYPERLIPIDEMIA LDL GOAL <100: Primary | ICD-10-CM

## 2021-09-16 DIAGNOSIS — E78.5 HYPERLIPIDEMIA: ICD-10-CM

## 2021-09-17 RX ORDER — ATORVASTATIN CALCIUM 10 MG/1
TABLET, FILM COATED ORAL
Qty: 90 TABLET | Refills: 3 | Status: SHIPPED | OUTPATIENT
Start: 2021-09-17 | End: 2022-07-13

## 2021-09-17 NOTE — TELEPHONE ENCOUNTER
"Routing refill request to provider for review/approval because:  Labs not current:  LDL    Last Written Prescription Date:  9/1/2020  Last Fill Quantity: 90,  # refills: 3   Last office visit provider:  4/23/21     Requested Prescriptions   Pending Prescriptions Disp Refills     atorvastatin (LIPITOR) 10 MG tablet [Pharmacy Med Name: Atorvastatin Calcium 10 MG Oral Tablet] 90 tablet 0     Sig: TAKE 1 TABLET BY MOUTH AT BEDTIME       Statins Protocol Failed - 9/16/2021  1:22 PM        Failed - LDL on file in past 12 months     Recent Labs   Lab Test 09/01/20  1144   LDL 84             Passed - No abnormal creatine kinase in past 12 months     No lab results found.             Passed - Recent (12 mo) or future (30 days) visit within the authorizing provider's specialty     Patient has had an office visit with the authorizing provider or a provider within the authorizing providers department within the previous 12 mos or has a future within next 30 days. See \"Patient Info\" tab in inbasket, or \"Choose Columns\" in Meds & Orders section of the refill encounter.              Passed - Medication is active on med list        Passed - Patient is age 18 or older        Passed - No active pregnancy on record        Passed - No positive pregnancy test in past 12 months             Almas Keating RN 09/17/21 11:37 AM  "

## 2021-10-11 ENCOUNTER — HEALTH MAINTENANCE LETTER (OUTPATIENT)
Age: 86
End: 2021-10-11

## 2021-10-26 ENCOUNTER — OFFICE VISIT (OUTPATIENT)
Dept: INTERNAL MEDICINE | Facility: CLINIC | Age: 86
End: 2021-10-26
Payer: MEDICARE

## 2021-10-26 VITALS
HEIGHT: 66 IN | WEIGHT: 129.3 LBS | HEART RATE: 83 BPM | DIASTOLIC BLOOD PRESSURE: 68 MMHG | BODY MASS INDEX: 20.78 KG/M2 | SYSTOLIC BLOOD PRESSURE: 138 MMHG | OXYGEN SATURATION: 97 %

## 2021-10-26 DIAGNOSIS — Z00.00 ROUTINE GENERAL MEDICAL EXAMINATION AT A HEALTH CARE FACILITY: Primary | ICD-10-CM

## 2021-10-26 DIAGNOSIS — I10 ESSENTIAL HYPERTENSION: ICD-10-CM

## 2021-10-26 DIAGNOSIS — E78.00 HYPERCHOLESTEREMIA: ICD-10-CM

## 2021-10-26 DIAGNOSIS — Z23 HIGH PRIORITY FOR 2019-NCOV VACCINE: ICD-10-CM

## 2021-10-26 DIAGNOSIS — I47.10 SVT (SUPRAVENTRICULAR TACHYCARDIA) (H): ICD-10-CM

## 2021-10-26 DIAGNOSIS — M48.062 SPINAL STENOSIS OF LUMBAR REGION WITH NEUROGENIC CLAUDICATION: ICD-10-CM

## 2021-10-26 LAB
ALBUMIN SERPL-MCNC: 4.4 G/DL (ref 3.5–5)
ALP SERPL-CCNC: 77 U/L (ref 45–120)
ALT SERPL W P-5'-P-CCNC: 18 U/L (ref 0–45)
ANION GAP SERPL CALCULATED.3IONS-SCNC: 11 MMOL/L (ref 5–18)
AST SERPL W P-5'-P-CCNC: 24 U/L (ref 0–40)
BILIRUB SERPL-MCNC: 0.8 MG/DL (ref 0–1)
BUN SERPL-MCNC: 22 MG/DL (ref 8–28)
CALCIUM SERPL-MCNC: 10.2 MG/DL (ref 8.5–10.5)
CHLORIDE BLD-SCNC: 104 MMOL/L (ref 98–107)
CHOLEST SERPL-MCNC: 175 MG/DL
CO2 SERPL-SCNC: 27 MMOL/L (ref 22–31)
CREAT SERPL-MCNC: 1.11 MG/DL (ref 0.6–1.1)
ERYTHROCYTE [DISTWIDTH] IN BLOOD BY AUTOMATED COUNT: 14.6 % (ref 10–15)
FASTING STATUS PATIENT QL REPORTED: YES
GFR SERPL CREATININE-BSD FRML MDRD: 45 ML/MIN/1.73M2
GLUCOSE BLD-MCNC: 109 MG/DL (ref 70–125)
HCT VFR BLD AUTO: 42.4 % (ref 35–47)
HDLC SERPL-MCNC: 70 MG/DL
HGB BLD-MCNC: 14.1 G/DL (ref 11.7–15.7)
LDLC SERPL CALC-MCNC: 90 MG/DL
MCH RBC QN AUTO: 32.5 PG (ref 26.5–33)
MCHC RBC AUTO-ENTMCNC: 33.3 G/DL (ref 31.5–36.5)
MCV RBC AUTO: 98 FL (ref 78–100)
PLATELET # BLD AUTO: 201 10E3/UL (ref 150–450)
POTASSIUM BLD-SCNC: 3.8 MMOL/L (ref 3.5–5)
PROT SERPL-MCNC: 8 G/DL (ref 6–8)
RBC # BLD AUTO: 4.34 10E6/UL (ref 3.8–5.2)
SODIUM SERPL-SCNC: 142 MMOL/L (ref 136–145)
TRIGL SERPL-MCNC: 75 MG/DL
TSH SERPL DL<=0.005 MIU/L-ACNC: 1.71 UIU/ML (ref 0.3–5)
WBC # BLD AUTO: 7 10E3/UL (ref 4–11)

## 2021-10-26 PROCEDURE — 85027 COMPLETE CBC AUTOMATED: CPT | Performed by: INTERNAL MEDICINE

## 2021-10-26 PROCEDURE — 91300 COVID-19,PF,PFIZER (12+ YRS): CPT | Performed by: INTERNAL MEDICINE

## 2021-10-26 PROCEDURE — 80061 LIPID PANEL: CPT | Performed by: INTERNAL MEDICINE

## 2021-10-26 PROCEDURE — 93010 ELECTROCARDIOGRAM REPORT: CPT | Mod: OFF | Performed by: INTERNAL MEDICINE

## 2021-10-26 PROCEDURE — 99214 OFFICE O/P EST MOD 30 MIN: CPT | Mod: 25 | Performed by: INTERNAL MEDICINE

## 2021-10-26 PROCEDURE — 36415 COLL VENOUS BLD VENIPUNCTURE: CPT | Performed by: INTERNAL MEDICINE

## 2021-10-26 PROCEDURE — 84443 ASSAY THYROID STIM HORMONE: CPT | Performed by: INTERNAL MEDICINE

## 2021-10-26 PROCEDURE — 80053 COMPREHEN METABOLIC PANEL: CPT | Performed by: INTERNAL MEDICINE

## 2021-10-26 PROCEDURE — 0004A COVID-19,PF,PFIZER (12+ YRS): CPT | Performed by: INTERNAL MEDICINE

## 2021-10-26 PROCEDURE — G0404 EKG TRACING FOR INITIAL PREV: HCPCS | Performed by: INTERNAL MEDICINE

## 2021-10-26 PROCEDURE — G0439 PPPS, SUBSEQ VISIT: HCPCS | Performed by: INTERNAL MEDICINE

## 2021-10-26 ASSESSMENT — MIFFLIN-ST. JEOR: SCORE: 1040.31

## 2021-10-26 ASSESSMENT — ACTIVITIES OF DAILY LIVING (ADL): CURRENT_FUNCTION: NO ASSISTANCE NEEDED

## 2021-10-26 NOTE — PROGRESS NOTES
"SUBJECTIVE:   Kim Valdes is a 86 year old female who presents for Preventive Visit.    Patient has been advised of split billing requirements and indicates understanding: Yes   Are you in the first 12 months of your Medicare coverage?  No    Healthy Habits:     In general, how would you rate your overall health?  Good    Frequency of exercise:  2-3 days/week    Duration of exercise:  15-30 minutes    Do you usually eat at least 4 servings of fruit and vegetables a day, include whole grains    & fiber and avoid regularly eating high fat or \"junk\" foods?  Yes    Taking medications regularly:  Yes    Barriers to taking medications:  None    Medication side effects:  None    Ability to successfully perform activities of daily living:  No assistance needed    Home Safety:  No safety concerns identified    Hearing Impairment:  No hearing concerns    In the past 6 months, have you been bothered by leaking of urine?  No    In general, how would you rate your overall mental or emotional health?  Excellent      PHQ-2 Total Score: 0    Additional concerns today:  No    Do you feel safe in your environment? Yes    Have you ever done Advance Care Planning? (For example, a Health Directive, POLST, or a discussion with a medical provider or your loved ones about your wishes): Yes, advance care planning is on file.       Fall risk  Fallen 2 or more times in the past year?: No  Any fall with injury in the past year?: No    Cognitive Screening   1) Repeat 3 items (Leader, Season, Table)    2) Clock draw: NORMAL  3) 3 item recall: Recalls 3 objects  Results: 3 items recalled: COGNITIVE IMPAIRMENT LESS LIKELY    Mini-CogTM Copyright ELIZABET Valdovinos. Licensed by the author for use in [facility]; reprinted with permission (zeynep@.[facility]). All rights reserved.      Do you have sleep apnea, excessive snoring or daytime drowsiness?: no    Reviewed and updated as needed this visit by clinical staff   Allergies  Meds          " "    Reviewed and updated as needed this visit by Provider                Social History     Tobacco Use     Smoking status: Never Smoker     Smokeless tobacco: Never Used   Substance Use Topics     Alcohol use: Not on file         Alcohol Use 10/26/2021   Prescreen: >3 drinks/day or >7 drinks/week? No     Current providers sharing in care for this patient include:   Patient Care Team:  Bryan Way MD as PCP - General  Bryan Way MD as Assigned PCP    The following health maintenance items are reviewed in Epic and correct as of today:  Health Maintenance Due   Topic Date Due     ANNUAL REVIEW OF HM ORDERS  Never done     ZOSTER IMMUNIZATION (1 of 2) Never done     FALL RISK ASSESSMENT  09/01/2021           Pertinent mammograms are reviewed under the imaging tab.    Review of Systems  Constitutional, HEENT, cardiovascular, pulmonary, gi and gu systems are negative, except as otherwise noted.    OBJECTIVE:   /68 (BP Location: Left arm, Patient Position: Sitting, Cuff Size: Adult Regular)   Pulse 83   Ht 1.664 m (5' 5.5\")   Wt 58.7 kg (129 lb 4.8 oz)   SpO2 97%   BMI 21.19 kg/m   Estimated body mass index is 21.19 kg/m  as calculated from the following:    Height as of this encounter: 1.664 m (5' 5.5\").    Weight as of this encounter: 58.7 kg (129 lb 4.8 oz).  Physical Exam  General: Alert, in no distress  Skin: No significant lesion seen.  Eyes/nose/throat: Eyes without scleral icterus, eye movements normal, pupils equal and reactive, oropharynx clear, ears with normal TM's  MSK: Neck with good ROM  Lymphatic: Neck without adenopathy or masses  Endocrine: Thyroid with no nodules to palpation  Pulm: Lungs clear to auscultation bilaterally  Cardiac: + Irregular rhythm, which EKG today confirms to be underlying sinus rhythm with frequent PACs and single PVCs  GI: Abdomen soft, nontender. No palpable enlargement of liver or spleen  MSK: Extremities no tenderness or edema  Neuro: Moves all " extremities, without focal weakness  Psych: Alert, normal mental status. Normal affect and speech    ASSESSMENT / PLAN:     Annual wellness visit, overall doing well since our previous meeting of April 2021.    On physical exam today 10/26/2021, I noticed that her heart rhythm was irregular, may be these are premature atrial contractions (and she has history of SVT), but lets get an EKG to characterize this rhythm.    Overall she feels well, is taking care of her  who is also my patient and is sitting in the room here today.    She is fasting this morning so we will get a lipid panel, comprehensive metabolic panel, blood cell counts, TSH thyroid test.    No changes to her medications.  Blood pressure looks well controlled.  I did remind her to start taking calcium and vitamin D supplements for her bones.    Administer third shot of Pfizer COVID-19 vaccine.  She has already received her flu shot for the year.    Irregular heartbeats noted today  EKG today confirms to be underlying sinus rhythm with frequent PACs and single PVCs  Supraventricular tachycardia on Cartia XT for the last 2 years.  She only gets occasional brief fluttering episodes, nothing sustained.    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     EXAM: MR LUMBAR SPINE WO CONTRAST  LOCATION: [facility]  DATE/TIME: 2/11/2020 2:24 PM     IMPRESSION:   1.  Multilevel  degenerative disc disease of the lumbar spine with symmetric disc bulges, most pronounced at the L4/L5 level where there is severe spinal canal and bilateral lateral recess narrowing (AP diameter the spinal canal measuring 3 mm).  2.  Advanced bilateral facet arthropathy and bilateral facet joint effusions at L4/L5. Flexion and extension views could be performed to evaluate for dynamic instability.  3.  Multilevel posterolateral disc disease and facet arthropathy with multilevel neural foraminal narrowing, most pronounced at the L4/L5 level where there is moderate to severe right and moderate left neural foraminal narrowing.     Continue with a conservative management strategy of exercise and maximizing mobility.     Cervical spondylosis, with central canal stenosis at C6-C7 level, with mild compression of the cervical spinal cord, but no evidence of myelomalacia by MRI March 5, 2020     EXAM: MR CERVICAL SPINE WO CONTRAST  LOCATION: [facility]  DATE/TIME: 3/17/2020 1:46 PM  1.  Multilevel degenerative disc disease of the cervical spine with disc osteophyte complexes, most pronounced at the C5/C6 level where there is moderate to severe spinal canal narrowing and compression of the cervical spinal cord and at the C6/C7 level   where there is moderate spinal canal narrowing and mild compression of the cervical spinal cord. No definite evidence of edema or myelomalacia at these levels.  2.  Multilevel uncovertebral joint disease and facet arthropathy with severe multilevel neural foraminal narrowing at C3/C4-C6/C7 as described.     Hypertension with elevated systolic component, blood pressure satisfactory today April 23, 2021  BP Readings from Last 6 Encounters:   10/26/21 138/68   04/23/21 120/80   09/01/20 (!) 144/70   03/05/20 (!) 149/74   01/29/20 132/66   11/22/19 (!) 144/68     She takes full tablet of the losartan 50/hydrochlorothiazide 12.5.     Current medications are losartan hydrochlorothiazide as a  fixed dose combination pill and also she takes Cartia XT for supraventricular tachycardia suppression, but that also has blood pressure lowering effects.     We will check metabolic panel today.     History of benign monoclonal gammopathy.    Serum protein electrophoresis from specimen drawn October 23, 2019 showed no monoclonal protein.  I think we can stop checking.     Hyperlipidemia on treatment.  Lipid profile looks great when checked September 1, 2020. Continue on atorvastatin 10 milligrams a day.  9-1-2020  Cholesterol <=199 mg/dL 163      Triglycerides <=149 mg/dL 93    Direct Measure HDL >=50 mg/dL 60    LDL Cholesterol Calculated <=129 mg/dL 84        Menopause with history of previous left oophrectomy.  Uterus is still in place.     Probably has at least osteopenia, probably osteoporosis, and I would like her to restart taking calcium and vitamin D supplement.  The amount of calcium is 1000 mg of elemental calcium per day.  The form of calcium can be calcium carbonate (for example Tums) or calcium citrate (Citracal or Caltrate)  The amount of the element calcium is reported on the label of these products.  Also over-the-counter vitamin D 2000 units a day.    History of adenomatous colon polyp.  Last colonoscopy was October 2015, and she has stopped colon screening.  The October 2015 colonoscopy showed diverticulosis, no polyps.  She can stop colon screening at this point.     Vulvar lichen sclerosus, for which she uses topical steroid ointment with good effect.    Ear wax on the left (canal is somewhat narrow), for which she could use an over-the-counter wax dissolving eardrop, example brands Debrox or Murine.     She has received Prevnar 13 and pneumococcal 23 polysaccharide already.  Received second dose of Pfizer COVID-19 vaccine February 25, 2021          Immunization History   Administered Date(s) Administered     COVID-19,PF,Pfizer 02/04/2021, 02/25/2021     Influenza high dose,seasonal,PF, 65+ yrs  "10/19/2015, 10/03/2016, 10/17/2017, 10/02/2018, 10/23/2019     Influenza, inj, historic,unspecified 11/06/2007, 10/31/2008, 11/25/2009, 10/20/2010, 11/18/2011     Influenza, seasonal,quad inj 6-35 mos 11/14/2012, 11/15/2013, 10/10/2014     Influenza,quad,high Dose,PF, 65yr + 10/06/2020     Pneumo Conj 13-V (2010&after) 04/22/2016     Pneumo Polysac 23-V 10/30/2002, 12/28/2007     Td,adult,historic,unspecified 12/01/2009     Tdap 12/01/2009     Patient has been advised of split billing requirements and indicates understanding: Yes  COUNSELING:  Reviewed preventive health counseling, as reflected in patient instructions       Healthy diet/nutrition    Estimated body mass index is 21.19 kg/m  as calculated from the following:    Height as of this encounter: 1.664 m (5' 5.5\").    Weight as of this encounter: 58.7 kg (129 lb 4.8 oz).        She reports that she has never smoked. She has never used smokeless tobacco.      Appropriate preventive services were discussed with this patient, including applicable screening as appropriate for cardiovascular disease, diabetes, osteopenia/osteoporosis, and glaucoma.  As appropriate for age/gender, discussed screening for colorectal cancer, prostate cancer, breast cancer, and cervical cancer. Checklist reviewing preventive services available has been given to the patient.    Reviewed patients plan of care and provided an AVS. The Basic Care Plan (routine screening as documented in Health Maintenance) for Kim meets the Care Plan requirement. This Care Plan has been established and reviewed with the Patient.    Counseling Resources:    GANGA FRANCIS MD  [facility]    Identified Health Risks:  "

## 2021-10-26 NOTE — PATIENT INSTRUCTIONS
Annual wellness visit, overall doing well since our previous meeting of April 2021.    On physical exam today 10/26/2021, I noticed that her heart rhythm was irregular, may be these are premature atrial contractions (and she has history of SVT), but lets get an EKG to characterize this rhythm.    Overall she feels well, is taking care of her  who is also my patient and is sitting in the room here today.    She is fasting this morning so we will get a lipid panel, comprehensive metabolic panel, blood cell counts, TSH thyroid test.    No changes to her medications.  Blood pressure looks well controlled.  I did remind her to start taking calcium and vitamin D supplements for her bones.    Administer third shot of Pfizer COVID-19 vaccine.  She has already received her flu shot for the year.    Irregular heartbeats noted today  Supraventricular tachycardia on Cartia XT for the last 2 years.  She only gets occasional brief fluttering episodes, nothing sustained.      Advanced lumbar spondylosis and central canal stenosis with Neurogenic claudication and right lower extremity radiculopathic symptoms with subtle findings of distal muscle weakness  [facility] 10/5/2021, received a right sided epidural steroid injection directed at the right L5 nerve root, additional diagnosis of L4-5 spondylolisthesis with severe central canal stenosis and neurogenic claudication, also severe L4-S1 facet arthritis with inflammation of the right L4 and L5 pedicles     Keep using Swiss rubber ball, which might help her do core muscle strengthening exercises for her lower back and abdominals.     EXAM: MR LUMBAR SPINE WO CONTRAST  LOCATION: [facility]  DATE/TIME: 2/11/2020 2:24 PM     IMPRESSION:   1.  Multilevel degenerative disc disease of the lumbar spine with symmetric disc bulges, most pronounced at the L4/L5 level where there is severe spinal canal and bilateral lateral recess narrowing (AP diameter the spinal canal  measuring 3 mm).  2.  Advanced bilateral facet arthropathy and bilateral facet joint effusions at L4/L5. Flexion and extension views could be performed to evaluate for dynamic instability.  3.  Multilevel posterolateral disc disease and facet arthropathy with multilevel neural foraminal narrowing, most pronounced at the L4/L5 level where there is moderate to severe right and moderate left neural foraminal narrowing.     Continue with a conservative management strategy of exercise and maximizing mobility.     Cervical spondylosis, with central canal stenosis at C6-C7 level, with mild compression of the cervical spinal cord, but no evidence of myelomalacia by MRI March 5, 2020     EXAM: MR CERVICAL SPINE WO CONTRAST  LOCATION: [facility]  DATE/TIME: 3/17/2020 1:46 PM  1.  Multilevel degenerative disc disease of the cervical spine with disc osteophyte complexes, most pronounced at the C5/C6 level where there is moderate to severe spinal canal narrowing and compression of the cervical spinal cord and at the C6/C7 level   where there is moderate spinal canal narrowing and mild compression of the cervical spinal cord. No definite evidence of edema or myelomalacia at these levels.  2.  Multilevel uncovertebral joint disease and facet arthropathy with severe multilevel neural foraminal narrowing at C3/C4-C6/C7 as described.     Hypertension with elevated systolic component, blood pressure satisfactory today April 23, 2021  BP Readings from Last 6 Encounters:   10/26/21 138/68   04/23/21 120/80   09/01/20 (!) 144/70   03/05/20 (!) 149/74   01/29/20 132/66   11/22/19 (!) 144/68     She takes full tablet of the losartan 50/hydrochlorothiazide 12.5.     Current medications are losartan hydrochlorothiazide as a fixed dose combination pill and also she takes Cartia XT for supraventricular tachycardia suppression, but that also has blood pressure lowering effects.     We will check metabolic panel today.     History of  benign monoclonal gammopathy.    Serum protein electrophoresis from specimen drawn October 23, 2019 showed no monoclonal protein.  I think we can stop checking.     Hyperlipidemia on treatment.  Lipid profile looks great when checked September 1, 2020. Continue on atorvastatin 10 milligrams a day.  9-1-2020  Cholesterol <=199 mg/dL 163      Triglycerides <=149 mg/dL 93    Direct Measure HDL >=50 mg/dL 60    LDL Cholesterol Calculated <=129 mg/dL 84        Menopause with history of previous left oophrectomy.  Uterus is still in place.     Probably has at least osteopenia, probably osteoporosis, and I would like her to restart taking calcium and vitamin D supplement.  The amount of calcium is 1000 mg of elemental calcium per day.  The form of calcium can be calcium carbonate (for example Tums) or calcium citrate (Citracal or Caltrate)  The amount of the element calcium is reported on the label of these products.  Also over-the-counter vitamin D 2000 units a day.    History of adenomatous colon polyp.  Last colonoscopy was October 2015, and she has stopped colon screening.  The October 2015 colonoscopy showed diverticulosis, no polyps.  She can stop colon screening at this point.     Vulvar lichen sclerosus, for which she uses topical steroid ointment with good effect.    Ear wax on the left (canal is somewhat narrow), for which she could use an over-the-counter wax dissolving eardrop, example brands Debrox or Murine.     She has received Prevnar 13 and pneumococcal 23 polysaccharide already.  Received second dose of Pfizer COVID-19 vaccine February 25, 2021

## 2021-10-27 LAB
ATRIAL RATE - MUSE: 63 BPM
DIASTOLIC BLOOD PRESSURE - MUSE: NORMAL MMHG
INTERPRETATION ECG - MUSE: NORMAL
P AXIS - MUSE: 54 DEGREES
PR INTERVAL - MUSE: 208 MS
QRS DURATION - MUSE: 76 MS
QT - MUSE: 436 MS
QTC - MUSE: 446 MS
R AXIS - MUSE: -19 DEGREES
SYSTOLIC BLOOD PRESSURE - MUSE: NORMAL MMHG
T AXIS - MUSE: 68 DEGREES
VENTRICULAR RATE- MUSE: 63 BPM

## 2021-11-23 ENCOUNTER — TRANSFERRED RECORDS (OUTPATIENT)
Dept: HEALTH INFORMATION MANAGEMENT | Facility: CLINIC | Age: 86
End: 2021-11-23
Payer: MEDICARE

## 2022-03-14 DIAGNOSIS — I10 ESSENTIAL HYPERTENSION: ICD-10-CM

## 2022-03-14 RX ORDER — LOSARTAN POTASSIUM AND HYDROCHLOROTHIAZIDE 12.5; 5 MG/1; MG/1
TABLET ORAL
Qty: 90 TABLET | Refills: 0 | Status: SHIPPED | OUTPATIENT
Start: 2022-03-14 | End: 2022-06-17

## 2022-03-14 NOTE — TELEPHONE ENCOUNTER
"Routing refill request to provider for review/approval because:  Labs out of range:  CR  A break in medication    Last Written Prescription Date:  2/4/2021  Last Fill Quantity: 90,  # refills: 3   Last office visit provider:  10/26/2021     Requested Prescriptions   Pending Prescriptions Disp Refills     losartan-hydrochlorothiazide (HYZAAR) 50-12.5 MG tablet [Pharmacy Med Name: Losartan Potassium-HCTZ 50-12.5 MG Oral Tablet] 90 tablet 0     Sig: Take 1 tablet by mouth once daily       Angiotensin-II Receptors Failed - 3/14/2022 12:46 PM        Failed - Normal serum creatinine on file in past 12 months     Recent Labs   Lab Test 10/26/21  1252   CR 1.11*       Ok to refill medication if creatinine is low          Passed - Last blood pressure under 140/90 in past 12 months     BP Readings from Last 3 Encounters:   10/26/21 138/68   04/23/21 120/80   09/01/20 (!) 144/70                 Passed - Recent (12 mo) or future (30 days) visit within the authorizing provider's specialty     Patient has had an office visit with the authorizing provider or a provider within the authorizing providers department within the previous 12 mos or has a future within next 30 days. See \"Patient Info\" tab in inbasket, or \"Choose Columns\" in Meds & Orders section of the refill encounter.              Passed - Medication is active on med list        Passed - Patient is age 18 or older        Passed - No active pregnancy on record        Passed - Normal serum potassium on file in past 12 months     Recent Labs   Lab Test 10/26/21  1252   POTASSIUM 3.8                    Passed - No positive pregnancy test in past 12 months       Diuretics (Including Combos) Protocol Failed - 3/14/2022 12:46 PM        Failed - Normal serum creatinine on file in past 12 months     Recent Labs   Lab Test 10/26/21  1252   CR 1.11*              Passed - Blood pressure under 140/90 in past 12 months     BP Readings from Last 3 Encounters:   10/26/21 138/68 " "  04/23/21 120/80   09/01/20 (!) 144/70                 Passed - Recent (12 mo) or future (30 days) visit within the authorizing provider's specialty     Patient has had an office visit with the authorizing provider or a provider within the authorizing providers department within the previous 12 mos or has a future within next 30 days. See \"Patient Info\" tab in inbasket, or \"Choose Columns\" in Meds & Orders section of the refill encounter.              Passed - Medication is active on med list        Passed - Patient is age 18 or older        Passed - No active pregancy on record        Passed - Normal serum potassium on file in past 12 months     Recent Labs   Lab Test 10/26/21  1252   POTASSIUM 3.8                    Passed - Normal serum sodium on file in past 12 months     Recent Labs   Lab Test 10/26/21  1252                 Passed - No positive pregnancy test in past 12 months             Jennifer Bai RN 03/14/22 4:39 PM  "

## 2022-04-27 ENCOUNTER — ANCILLARY PROCEDURE (OUTPATIENT)
Dept: GENERAL RADIOLOGY | Facility: CLINIC | Age: 87
End: 2022-04-27
Attending: INTERNAL MEDICINE
Payer: MEDICARE

## 2022-04-27 ENCOUNTER — OFFICE VISIT (OUTPATIENT)
Dept: INTERNAL MEDICINE | Facility: CLINIC | Age: 87
End: 2022-04-27

## 2022-04-27 VITALS
HEIGHT: 66 IN | HEART RATE: 77 BPM | BODY MASS INDEX: 20.25 KG/M2 | SYSTOLIC BLOOD PRESSURE: 134 MMHG | DIASTOLIC BLOOD PRESSURE: 68 MMHG | WEIGHT: 126 LBS | OXYGEN SATURATION: 99 %

## 2022-04-27 DIAGNOSIS — M25.512 CHRONIC LEFT SHOULDER PAIN: ICD-10-CM

## 2022-04-27 DIAGNOSIS — G89.29 CHRONIC LEFT SHOULDER PAIN: ICD-10-CM
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
## 2022-04-27 DIAGNOSIS — Z23 HIGH PRIORITY FOR COVID-19 VACCINATION: ICD-10-CM

## 2022-04-27 DIAGNOSIS — I47.10 SVT (SUPRAVENTRICULAR TACHYCARDIA) (H): ICD-10-CM

## 2022-04-27 DIAGNOSIS — M75.102 ROTATOR CUFF SYNDROME, LEFT: Primary | ICD-10-CM

## 2022-04-27 DIAGNOSIS — I10 ESSENTIAL HYPERTENSION: ICD-10-CM

## 2022-04-27 PROCEDURE — 91305 COVID-19,PF,PFIZER (12+ YRS): CPT | Performed by: INTERNAL MEDICINE

## 2022-04-27 PROCEDURE — 99214 OFFICE O/P EST MOD 30 MIN: CPT | Performed by: INTERNAL MEDICINE

## 2022-04-27 PROCEDURE — 73030 X-RAY EXAM OF SHOULDER: CPT | Mod: TC | Performed by: RADIOLOGY

## 2022-04-27 PROCEDURE — 0054A COVID-19,PF,PFIZER (12+ YRS): CPT | Performed by: INTERNAL MEDICINE

## 2022-04-27 NOTE — PROGRESS NOTES
Office Visit - Follow Up   Kim Valdes   87 year old female    Date of Visit: 4/27/2022    Chief Complaint   Patient presents with     Follow Up     6 month follow up. fasting     Arm Pain     Left arm pain.         -------------------------------------------------------------------------------------------------------------------------  Assessment and Plan    Followup, overall doing well since our previous meeting of October 2021.    Left shoulder pain and reduced range of motion after a fall  Slipped on black ice December 26, 2021, sustaining a contusion of her left upper arm, possibly injuring her shoulder.  On examination today, she is not able to fully raise that left arm above the head.  Because there was trauma, lets get an x-ray to look for any signs of fracture.  Then we will have her see physical therapy to work on range of motion.     Overall she feels well, is taking care of her  who is also my patient and is sitting in the room here today.     No changes to her medications.  Blood pressure looks well controlled.  I did remind her to start taking calcium and vitamin D supplements for her bones.    We will give her her fourth shot of Pfizer today April 27, 2022 (previous booster was in October 2021    Irregular heartbeats noted today  EKG today confirms to be underlying sinus rhythm with frequent PACs and single PVCs  Supraventricular tachycardia on Cartia XT for the last 2 years.  She only gets occasional brief fluttering episodes, nothing sustained.     Advanced lumbar spondylosis and central canal stenosis with Neurogenic claudication and right lower extremity radiculopathic symptoms with subtle findings of distal muscle weakness  [facility] 10/5/2021, received a right sided epidural steroid injection directed at the right L5 nerve root, additional diagnosis of L4-5 spondylolisthesis with severe central canal stenosis and neurogenic claudication, also severe L4-S1 facet arthritis  with inflammation of the right L4 and L5 pedicles     Keep using Swiss rubber ball, which might help her do core muscle strengthening exercises for her lower back and abdominals.  EXAM: MR LUMBAR SPINE WO CONTRAST  LOCATION: [facility]  DATE/TIME: 2/11/2020 2:24 PM     IMPRESSION:   1.  Multilevel degenerative disc disease of the lumbar spine with symmetric disc bulges, most pronounced at the L4/L5 level where there is severe spinal canal and bilateral lateral recess narrowing (AP diameter the spinal canal measuring 3 mm).  2.  Advanced bilateral facet arthropathy and bilateral facet joint effusions at L4/L5. Flexion and extension views could be performed to evaluate for dynamic instability.  3.  Multilevel posterolateral disc disease and facet arthropathy with multilevel neural foraminal narrowing, most pronounced at the L4/L5 level where there is moderate to severe right and moderate left neural foraminal narrowing.     Continue with a conservative management strategy of exercise and maximizing mobility.     Cervical spondylosis, with central canal stenosis at C6-C7 level, with mild compression of the cervical spinal cord, but no evidence of myelomalacia by MRI March 5, 2020    EXAM: MR CERVICAL SPINE WO CONTRAST  LOCATION: [facility]  DATE/TIME: 3/17/2020 1:46 PM  1.  Multilevel degenerative disc disease of the cervical spine with disc osteophyte complexes, most pronounced at the C5/C6 level where there is moderate to severe spinal canal narrowing and compression of the cervical spinal cord and at the C6/C7 level   where there is moderate spinal canal narrowing and mild compression of the cervical spinal cord. No definite evidence of edema or myelomalacia at these levels.  2.  Multilevel uncovertebral joint disease and facet arthropathy with severe multilevel neural foraminal narrowing at C3/C4-C6/C7 as described.     Hypertension with elevated systolic component, blood pressure satisfactory today  April 23, 2021  BP Readings from Last 6 Encounters:   04/27/22 134/68   10/26/21 138/68   04/23/21 120/80   09/01/20 (!) 144/70   03/05/20 (!) 149/74   01/29/20 132/66     She takes full tablet of the losartan 50/hydrochlorothiazide 12.5.     Current medications are losartan hydrochlorothiazide as a fixed dose combination pill and also she takes Cartia XT for supraventricular tachycardia suppression, but that also has blood pressure lowering effects.     History of benign monoclonal gammopathy.    Serum protein electrophoresis from specimen drawn October 23, 2019 showed no monoclonal protein.  I think we can stop checking.     Hyperlipidemia on treatment.  Lipid profile looks great when checked September 1, 2020. Continue on atorvastatin 10 milligrams a day  10-  LDL Cholesterol Calculated <=129 mg/dL 90     Direct Measure HDL >=50 mg/dL 70     Triglycerides <=149 mg/dL 75     Cholesterol <=199 mg/dL 175     Menopause with history of previous left oophrectomy.  Uterus is still in place.     Probably has at least osteopenia, probably osteoporosis, and I would like her to restart taking calcium and vitamin D supplement.  The amount of calcium is 1000 mg of elemental calcium per day.  The form of calcium can be calcium carbonate (for example Tums) or calcium citrate (Citracal or Caltrate)  The amount of the element calcium is reported on the label of these products.  Also over-the-counter vitamin D 2000 units a day.     History of adenomatous colon polyp.  Last colonoscopy was October 2015, and she has stopped colon screening.  The October 2015 colonoscopy showed diverticulosis, no polyps.  She can stop colon screening at this point.     Vulvar lichen sclerosus, for which she uses topical steroid ointment with good effect.    Ear wax on the left (canal is somewhat narrow), for which she needs to get into the week routine using an over-the-counter wax dissolving eardrop, example brands Debrox or Murine.     She  has received Prevnar 13 and pneumococcal 23 polysaccharide already.  Received third dose of Pfizer COVID-19 vaccine October 2021      --------------------------------------------------------------------------------------------------------------------------  History of Present Illness  This 87 year old old     Followup, overall doing well since our previous meeting of October 2021.    Left shoulder pain and reduced range of motion after a fall  Slipped on black ice December 26, 2021, sustaining a contusion of her left upper arm, possibly injuring her shoulder.  On examination today, she is not able to fully raise that left arm above the head.  Because there was trauma, lets get an x-ray to look for any signs of fracture.  Then we will have her see physical therapy to work on range of motion.     Overall she feels well, is taking care of her  who is also my patient and is sitting in the room here today.     No changes to her medications.  Blood pressure looks well controlled.  I did remind her to start taking calcium and vitamin D supplements for her bones.    We will give her her fourth shot of Pfizer today April 27, 2022 (previous booster was in October 2021      Wt Readings from Last 3 Encounters:   04/27/22 57.2 kg (126 lb)   10/26/21 58.7 kg (129 lb 4.8 oz)   04/23/21 59.4 kg (131 lb)     BP Readings from Last 3 Encounters:   04/27/22 134/68   10/26/21 138/68   04/23/21 120/80     ---------------------------------------------------------------------------------------------------------------------------    Medications, Allergies, Social, and Problem List   Current Outpatient Medications   Medication Sig Dispense Refill     aspirin 81 MG EC tablet [ASPIRIN 81 MG EC TABLET] Take 81 mg by mouth every evening.       atorvastatin (LIPITOR) 10 MG tablet TAKE 1 TABLET BY MOUTH AT BEDTIME 90 tablet 3     diltiazem ER COATED BEADS (CARDIZEM CD/CARTIA XT) 120 MG 24 hr capsule Take 1 capsule by mouth once daily 90  "capsule 3     losartan-hydrochlorothiazide (HYZAAR) 50-12.5 MG tablet Take 1 tablet by mouth once daily 90 tablet 0     mometasone (ELOCON) 0.1 % ointment [MOMETASONE (ELOCON) 0.1 % OINTMENT] Apply thin layer QD to affected area when symptoms present, once a week for maintenance       Allergies   Allergen Reactions     Omeprazole Unknown     Social History     Tobacco Use     Smoking status: Never Smoker     Smokeless tobacco: Never Used     Patient Active Problem List   Diagnosis     Symptomatic Menopause     Hypercholesteremia     Adenomatous polyp     SVT (supraventricular tachycardia) (H)     Essential hypertension     Renal insufficiency     Spinal stenosis of lumbar region with neurogenic claudication     Cervical stenosis of spinal canal        Reviewed, reconciled and updated       Physical Exam   General Appearance:      /68 (BP Location: Right arm, Patient Position: Sitting, Cuff Size: Adult Regular)   Pulse 77   Ht 1.664 m (5' 5.5\")   Wt 57.2 kg (126 lb)   SpO2 99%   BMI 20.65 kg/m      She looks good today, blood pressure satisfactory  Lungs clear  Heart mostly regular rhythm, with occasional skipped probably her PACs and PVCs  Abdomen nontender  Extremities no edema  Left shoulder has significantly reduced range of motion when she tries to elevate her left arm to the vertical, can only get to about 30 degrees above horizontal.     Additional Information   I spent 30 minutes on this encounter, including reviewing interval history since last visit, examining the patient, explaining and counseling the issues enumerated in the Assessment and Plan (patient given a copy), ordering indicated tests, ordering prescriptions, ordering referrals.       GANGA FRANCIS MD, MD    "

## 2022-04-27 NOTE — PATIENT INSTRUCTIONS
Followup, overall doing well since our previous meeting of October 2021.    Left shoulder pain and reduced range of motion after a fall  Slipped on black ice December 26, 2021, sustaining a contusion of her left upper arm, possibly injuring her shoulder.  On examination today, she is not able to fully raise that left arm above the head.  Because there was trauma, lets get an x-ray to look for any signs of fracture.  Then we will have her see physical therapy to work on range of motion.     Overall she feels well, is taking care of her  who is also my patient and is sitting in the room here today.     No changes to her medications.  Blood pressure looks well controlled.  I did remind her to start taking calcium and vitamin D supplements for her bones.    We will give her her fourth shot of Pfizer today April 27, 2022 (previous booster was in October 2021    Irregular heartbeats noted today  EKG today confirms to be underlying sinus rhythm with frequent PACs and single PVCs  Supraventricular tachycardia on Cartia XT for the last 2 years.  She only gets occasional brief fluttering episodes, nothing sustained.     Advanced lumbar spondylosis and central canal stenosis with Neurogenic claudication and right lower extremity radiculopathic symptoms with subtle findings of distal muscle weakness  [facility] 10/5/2021, received a right sided epidural steroid injection directed at the right L5 nerve root, additional diagnosis of L4-5 spondylolisthesis with severe central canal stenosis and neurogenic claudication, also severe L4-S1 facet arthritis with inflammation of the right L4 and L5 pedicles     Keep using Swiss rubber ball, which might help her do core muscle strengthening exercises for her lower back and abdominals.  EXAM: MR LUMBAR SPINE WO CONTRAST  LOCATION: [facility]  DATE/TIME: 2/11/2020 2:24 PM     IMPRESSION:   1.  Multilevel degenerative disc disease of the lumbar spine with symmetric  disc bulges, most pronounced at the L4/L5 level where there is severe spinal canal and bilateral lateral recess narrowing (AP diameter the spinal canal measuring 3 mm).  2.  Advanced bilateral facet arthropathy and bilateral facet joint effusions at L4/L5. Flexion and extension views could be performed to evaluate for dynamic instability.  3.  Multilevel posterolateral disc disease and facet arthropathy with multilevel neural foraminal narrowing, most pronounced at the L4/L5 level where there is moderate to severe right and moderate left neural foraminal narrowing.     Continue with a conservative management strategy of exercise and maximizing mobility.     Cervical spondylosis, with central canal stenosis at C6-C7 level, with mild compression of the cervical spinal cord, but no evidence of myelomalacia by MRI March 5, 2020    EXAM: MR CERVICAL SPINE WO CONTRAST  LOCATION: [facility]  DATE/TIME: 3/17/2020 1:46 PM  1.  Multilevel degenerative disc disease of the cervical spine with disc osteophyte complexes, most pronounced at the C5/C6 level where there is moderate to severe spinal canal narrowing and compression of the cervical spinal cord and at the C6/C7 level   where there is moderate spinal canal narrowing and mild compression of the cervical spinal cord. No definite evidence of edema or myelomalacia at these levels.  2.  Multilevel uncovertebral joint disease and facet arthropathy with severe multilevel neural foraminal narrowing at C3/C4-C6/C7 as described.     Hypertension with elevated systolic component, blood pressure satisfactory today April 23, 2021  BP Readings from Last 6 Encounters:   04/27/22 134/68   10/26/21 138/68   04/23/21 120/80   09/01/20 (!) 144/70   03/05/20 (!) 149/74   01/29/20 132/66     She takes full tablet of the losartan 50/hydrochlorothiazide 12.5.     Current medications are losartan hydrochlorothiazide as a fixed dose combination pill and also she takes Cartia XT for  supraventricular tachycardia suppression, but that also has blood pressure lowering effects.     History of benign monoclonal gammopathy.    Serum protein electrophoresis from specimen drawn October 23, 2019 showed no monoclonal protein.  I think we can stop checking.     Hyperlipidemia on treatment.  Lipid profile looks great when checked September 1, 2020. Continue on atorvastatin 10 milligrams a day  10-  LDL Cholesterol Calculated <=129 mg/dL 90     Direct Measure HDL >=50 mg/dL 70     Triglycerides <=149 mg/dL 75     Cholesterol <=199 mg/dL 175     Menopause with history of previous left oophrectomy.  Uterus is still in place.     Probably has at least osteopenia, probably osteoporosis, and I would like her to restart taking calcium and vitamin D supplement.  The amount of calcium is 1000 mg of elemental calcium per day.  The form of calcium can be calcium carbonate (for example Tums) or calcium citrate (Citracal or Caltrate)  The amount of the element calcium is reported on the label of these products.  Also over-the-counter vitamin D 2000 units a day.     History of adenomatous colon polyp.  Last colonoscopy was October 2015, and she has stopped colon screening.  The October 2015 colonoscopy showed diverticulosis, no polyps.  She can stop colon screening at this point.     Vulvar lichen sclerosus, for which she uses topical steroid ointment with good effect.    Ear wax on the left (canal is somewhat narrow), for which she needs to get into the week routine using an over-the-counter wax dissolving eardrop, example brands Debrox or Murine.     She has received Prevnar 13 and pneumococcal 23 polysaccharide already.  Received third dose of Pfizer COVID-19 vaccine October 2021

## 2022-06-16 DIAGNOSIS — I10 ESSENTIAL HYPERTENSION: ICD-10-CM

## 2022-06-17 RX ORDER — LOSARTAN POTASSIUM AND HYDROCHLOROTHIAZIDE 12.5; 5 MG/1; MG/1
TABLET ORAL
Qty: 90 TABLET | Refills: 0 | Status: SHIPPED | OUTPATIENT
Start: 2022-06-17 | End: 2022-09-16

## 2022-06-17 NOTE — TELEPHONE ENCOUNTER
"Routing refill request to provider for review/approval because:  Labs out of range:  Creatinine    Last Written Prescription Date:  3/14/22  Last Fill Quantity: 90,  # refills: 0   Last office visit provider:  4/27/22     Requested Prescriptions   Pending Prescriptions Disp Refills     losartan-hydrochlorothiazide (HYZAAR) 50-12.5 MG tablet [Pharmacy Med Name: Losartan Potassium-HCTZ 50-12.5 MG Oral Tablet] 90 tablet 0     Sig: Take 1 tablet by mouth once daily       Angiotensin-II Receptors Failed - 6/17/2022 11:02 AM        Failed - Normal serum creatinine on file in past 12 months     Recent Labs   Lab Test 10/26/21  1252   CR 1.11*       Ok to refill medication if creatinine is low          Passed - Last blood pressure under 140/90 in past 12 months     BP Readings from Last 3 Encounters:   04/27/22 134/68   10/26/21 138/68   04/23/21 120/80                 Passed - Recent (12 mo) or future (30 days) visit within the authorizing provider's specialty     Patient has had an office visit with the authorizing provider or a provider within the authorizing providers department within the previous 12 mos or has a future within next 30 days. See \"Patient Info\" tab in inbasket, or \"Choose Columns\" in Meds & Orders section of the refill encounter.              Passed - Medication is active on med list        Passed - Patient is age 18 or older        Passed - No active pregnancy on record        Passed - Normal serum potassium on file in past 12 months     Recent Labs   Lab Test 10/26/21  1252   POTASSIUM 3.8                    Passed - No positive pregnancy test in past 12 months       Diuretics (Including Combos) Protocol Failed - 6/17/2022 11:02 AM        Failed - Normal serum creatinine on file in past 12 months     Recent Labs   Lab Test 10/26/21  1252   CR 1.11*              Passed - Blood pressure under 140/90 in past 12 months     BP Readings from Last 3 Encounters:   04/27/22 134/68   10/26/21 138/68   04/23/21 " "120/80                 Passed - Recent (12 mo) or future (30 days) visit within the authorizing provider's specialty     Patient has had an office visit with the authorizing provider or a provider within the authorizing providers department within the previous 12 mos or has a future within next 30 days. See \"Patient Info\" tab in inbasket, or \"Choose Columns\" in Meds & Orders section of the refill encounter.              Passed - Medication is active on med list        Passed - Patient is age 18 or older        Passed - No active pregancy on record        Passed - Normal serum potassium on file in past 12 months     Recent Labs   Lab Test 10/26/21  1252   POTASSIUM 3.8                    Passed - Normal serum sodium on file in past 12 months     Recent Labs   Lab Test 10/26/21  1252                 Passed - No positive pregnancy test in past 12 months             Mustapha Fu RN 06/17/22 11:02 AM  "

## 2022-06-20 ENCOUNTER — HOSPITAL ENCOUNTER (OUTPATIENT)
Dept: PHYSICAL THERAPY | Facility: REHABILITATION | Age: 87
Discharge: HOME | End: 2022-06-20
Attending: INTERNAL MEDICINE
Payer: MEDICARE

## 2022-06-20 DIAGNOSIS — M75.102 ROTATOR CUFF SYNDROME, LEFT: ICD-10-CM

## 2022-06-20 PROCEDURE — 97161 PT EVAL LOW COMPLEX 20 MIN: CPT | Mod: GP | Performed by: PHYSICAL THERAPIST

## 2022-06-20 PROCEDURE — 97110 THERAPEUTIC EXERCISES: CPT | Mod: GP | Performed by: PHYSICAL THERAPIST

## 2022-06-20 NOTE — PROGRESS NOTES
[facility]    OUTPATIENT PHYSICAL THERAPY ORTHOPEDIC EVALUATION  PLAN OF TREATMENT FOR OUTPATIENT REHABILITATION  (COMPLETE FOR INITIAL CLAIMS ONLY)  Patient's Last Name, First Name, M.I.  YOB: 1935  Kim Valdes    Provider s Name:  [facility]   Medical Record No.  [id number]   Start of Care Date:  06/20/22   Onset Date:  12/26/21   Type:     _X__PT   ___OT   ___SLP Medical Diagnosis:  Left Shoulder Pain     PT Diagnosis:  (P) Left Shoulder Pain, weakness,   Visits from SOC:  1      _________________________________________________________________________________  Plan of Treatment/Functional Goals:  joint mobilization, manual therapy, neuromuscular re-education, ROM, strengthening, stretching     Ultrasound     Goals  Goal Identifier: reach up  Goal Description: Pt will be able to reach up with left arm to put objects in cupboard as strength improves by 1/2 grade  Target Date: 09/17/22    Goal Identifier: reach behind  Goal Description: Pt will be able to reach behind to tuck shirt in as AROm improves by 10 degrees  Target Date: 09/17/22                                                                      Therapy Frequency:  2 times/Week  Predicted Duration of Therapy Intervention:  8-10, prn    Khushbu Nogueira, PT                 I CERTIFY THE NEED FOR THESE SERVICES FURNISHED UNDER        THIS PLAN OF TREATMENT AND WHILE UNDER MY CARE     (Physician co-signature of this document indicates review and certification of the therapy plan).                     Certification Date From:  06/20/22   Certification Date To:  09/17/22    Referring Provider:  Dr. Way    Initial Assessment        See Epic Evaluation Start of Care Date: 06/20/22 06/20/22 1047   General Information   Type  of Visit Initial OP Ortho PT Evaluation   Start of Care Date 06/20/22   Referring Physician Dr. Way   Orders Evaluate and Treat   Certification Required? Yes   Medical Diagnosis Left Shoulder RC pain   Surgical/Medical history reviewed Yes   General Information Comments osteopenia, Cervical and lumbar stenosis   Body Part(s)   Body Part(s) Shoulder   Presentation and Etiology   Pertinent history of current problem (include personal factors and/or comorbidities that impact the POC) Slipped on the ice 12/26/2021 and has had shoulder pain since. It has improved overall but continues to have some pain. X-ray was negative for fractures.   Impairments A. Pain;D. Decreased ROM;E. Decreased flexibility   Functional Limitations perform activities of daily living   Symptom Location left throughout left shoulder and elbow   How/Where did it occur Other   Onset date of current episode/exacerbation 12/26/21   Chronicity Chronic   Pain rating (0-10 point scale) Best (/10);Worst (/10)   Best (/10) 0   Worst (/10) 2   Pain quality B. Dull   Frequency of pain/symptoms C. With activity   Pain/symptoms are: The same all the time   Pain/symptoms exacerbated by G. Certain positions;H. Overhead reach;M. Other   Pain exacerbation comment lifting   Pain/symptoms eased by C. Rest;E. Changing positions;I. OTC medication(s)   Progression of symptoms since onset: Improved   Current Level of Function   Living environment House/townhome   Home/community accessibility lives with    Fall Risk Screen   Fall screen completed by PT   Have you fallen 2 or more times in the past year? No   Have you fallen and had an injury in the past year? No   Is patient a fall risk? No   Fall screen comments fell on ice 1x which is what caused her shoulder pain   Abuse Screen (yes response referral indicated)   Feels Unsafe at Home or Work/School no   Feels Threatened by Someone no   Does Anyone Try to Keep You From Having Contact with Others or Doing Things  Outside Your Home? no   Physical Signs of Abuse Present no   Patient needs abuse support services and resources No   Shoulder Objective Findings   Side (if bilateral, select both right and left) Left   Shoulder ROM Comment limited due to pain, elbow AROM WNL   Scapulothoracic Rhythm within normal limits   Shoulder Flexibility Comments limited due to pain   Hernandez-Ian Test + L   Palpation pain over L supraspinatus, infraspinatus, biceps tendon in groove, wrist extensor mm belly,   Posture moderate+ forward head, thoracic kyphosis, rounded shoulders   Left Shoulder Flexion AROM 110, pain   Left Shoulder Abduction AROM 98, pain   Left Shoulder ER AROM 50   Left Shoulder IR AROM 24, pain   Left Shoulder IR PROM shld IR/ext: R=T6, L=t10   Left Shoulder Flexion Strength 5-   Left Shoulder Abduction Strength 4+, pain   Left Shoulder ER Strength pain   Left Shoulder IR Strength pain   Left Shoulder Extension Strength left elbow strength: limited due to pain   Planned Therapy Interventions   Planned Therapy Interventions joint mobilization;manual therapy;neuromuscular re-education;ROM;strengthening;stretching   Planned Modality Interventions   Planned Modality Interventions Ultrasound   Clinical Impression   Criteria for Skilled Therapeutic Interventions Met yes, treatment indicated   PT Diagnosis Left Shoulder Pain, weakness,   Influenced by the following impairments pain to palpation, decrease and painful AROM, + Hernandez' Ian Test   Functional limitations due to impairments reaching lifting, household tasks   Clinical Presentation Stable/Uncomplicated   Clinical Decision Making (Complexity) Low complexity   Therapy Frequency 2 times/Week   Predicted Duration of Therapy Intervention (days/wks) 8-10, prn   Risk & Benefits of therapy have been explained Yes   Patient, Family & other staff in agreement with plan of care Yes   Clinical Impression Comments Pt presents with =left shoulder pain after falling on the ice  12/26/21.  Much of her pain has decreased but she continues to be limited with her AROM, flexibility and strength which decreases her ability to reach up, behind, lift obejcts and perform household tasks.  Feel pt will benefit from physical therapy to work on limitations.   ORTHO GOALS   PT Ortho Eval Goals 1;2   Ortho Goal 1   Goal Identifier reach up   Goal Description Pt will be able to reach up with left arm to put objects in cupboard as strength improves by 1/2 grade   Target Date 09/17/22   Ortho Goal 2   Goal Identifier reach behind   Goal Description Pt will be able to reach behind to tuck shirt in as AROm improves by 10 degrees   Target Date 09/17/22   Total Evaluation Time   PT Eval, Low Complexity Minutes (29985) 30   Therapy Certification   Certification date from 06/20/22   Certification date to 09/17/22   Medical Diagnosis Left Shoulder Pain

## 2022-06-30 ENCOUNTER — HOSPITAL ENCOUNTER (OUTPATIENT)
Dept: PHYSICAL THERAPY | Facility: REHABILITATION | Age: 87
Discharge: HOME | End: 2022-06-30
Payer: MEDICARE

## 2022-06-30 DIAGNOSIS — M75.102 ROTATOR CUFF SYNDROME, LEFT: Primary | ICD-10-CM

## 2022-06-30 PROCEDURE — 97140 MANUAL THERAPY 1/> REGIONS: CPT | Mod: GP | Performed by: PHYSICAL THERAPIST

## 2022-06-30 PROCEDURE — 97110 THERAPEUTIC EXERCISES: CPT | Mod: GP | Performed by: PHYSICAL THERAPIST

## 2022-06-30 NOTE — PROGRESS NOTES
Date 6/30/22 6/20/22   Exercise     Supine wand shoulder flex discussed Hold 5-10 s X 3-5 reps   Wall walk flex for shld discussed Hold 30 seconds X 1-3 reps   Standing wand shld IR/EXT  Hold 30 seconds X 1-3 reps   Chin tuck and scapular retraction Hold 10 seconds X 5-10 reps    Doorway pec stretch Hold 30 seconds X 1-3 reps    Shoulder isometrics:  Flex  ext Hold 5-10 seconds, X 3-5 reps, mild to mod resistance                                    Assessment:  Pt returns for her first follow-up and feels the stretches have been helpful.  She has some residual pain in her left biceps tendon in the bicipital groove and into the mm belly.  She did not have any increase in pain with any of the ex today.  Feel she will benefit from continued physical therapy to work on decreasing pain and improving her function.     Khushbu Nogueira, PT  6/30/22

## 2022-07-08 ENCOUNTER — HOSPITAL ENCOUNTER (OUTPATIENT)
Dept: PHYSICAL THERAPY | Facility: REHABILITATION | Age: 87
Discharge: HOME | End: 2022-07-08
Payer: MEDICARE

## 2022-07-08 DIAGNOSIS — M75.102 ROTATOR CUFF SYNDROME, LEFT: Primary | ICD-10-CM

## 2022-07-08 PROCEDURE — 97140 MANUAL THERAPY 1/> REGIONS: CPT | Mod: GP | Performed by: PHYSICAL THERAPIST

## 2022-07-08 PROCEDURE — 97110 THERAPEUTIC EXERCISES: CPT | Mod: GP | Performed by: PHYSICAL THERAPIST

## 2022-07-08 NOTE — PROGRESS NOTES
Date 7/8/22 6/30/22 6/20/22   Exercise      Supine wand shoulder flex Reviewed.  Pt may bend elbow returning to neutral if painful.  discussed Hold 5-10 s X 3-5 reps   Wall walk flex for shld Hold 30 seconds X 2 discussed Hold 30 seconds X 1-3 reps   Standing wand shld IR/EXT discussed  Hold 30 seconds X 1-3 reps   Chin tuck and scapular retraction Will discharge chin tuck  Will continue scapular retraction Hold 10 seconds X 5-10 reps    Doorway pec stretch Hold 30 seconds X 2 Hold 30 seconds X 1-3 reps    Shoulder isometrics:  Flex  ext  Hold 5-10 seconds, X 3-5 reps, mild to mod resistance    UBE 4'     Shoulder isometrics:   Add  abd  ER   IR Hold 5-10 seconds X 3-5 reps, mild to mod resistance                               Assessment:  Pt returns and continues to have difficulty with the chin tuck so will discharge that ex. Able to add other shoulder isometrics today and feel pt had a good understanding but did require a few times reviewing.  She felt the manual therapy did loosen up her shoulder and was not as tight leaving the clinic. Feel she will benefit from continued physical therapy to work on decreasing pain and improving her function.     Khushbu Nogueira, PT  7/8/22

## 2022-07-13 DIAGNOSIS — E78.5 HYPERLIPIDEMIA LDL GOAL <100: ICD-10-CM

## 2022-07-13 RX ORDER — ATORVASTATIN CALCIUM 10 MG/1
TABLET, FILM COATED ORAL
Qty: 90 TABLET | Refills: 0 | Status: SHIPPED | OUTPATIENT
Start: 2022-07-13 | End: 2022-11-03

## 2022-07-14 NOTE — TELEPHONE ENCOUNTER
"Last Written Prescription Date:  9/17/21  Last Fill Quantity: 90,  # refills: 3   Last office visit provider:  4/27/22     Requested Prescriptions   Pending Prescriptions Disp Refills     atorvastatin (LIPITOR) 10 MG tablet [Pharmacy Med Name: Atorvastatin Calcium 10 MG Oral Tablet] 90 tablet 0     Sig: TAKE 1 TABLET BY MOUTH AT BEDTIME       Statins Protocol Passed - 7/13/2022 10:42 AM        Passed - LDL on file in past 12 months     Recent Labs   Lab Test 10/26/21  1252   LDL 90             Passed - No abnormal creatine kinase in past 12 months     No lab results found.             Passed - Recent (12 mo) or future (30 days) visit within the authorizing provider's specialty     Patient has had an office visit with the authorizing provider or a provider within the authorizing providers department within the previous 12 mos or has a future within next 30 days. See \"Patient Info\" tab in inbasket, or \"Choose Columns\" in Meds & Orders section of the refill encounter.              Passed - Medication is active on med list        Passed - Patient is age 18 or older        Passed - No active pregnancy on record        Passed - No positive pregnancy test in past 12 months             Amada Garcia, RN 07/13/22 9:15 PM  "

## 2022-07-15 ENCOUNTER — HOSPITAL ENCOUNTER (OUTPATIENT)
Dept: PHYSICAL THERAPY | Facility: REHABILITATION | Age: 87
Discharge: HOME | End: 2022-07-15
Payer: MEDICARE

## 2022-07-15 DIAGNOSIS — M75.102 ROTATOR CUFF SYNDROME, LEFT: Primary | ICD-10-CM

## 2022-07-15 PROCEDURE — 97110 THERAPEUTIC EXERCISES: CPT | Mod: GP | Performed by: PHYSICAL THERAPIST

## 2022-07-15 PROCEDURE — 97140 MANUAL THERAPY 1/> REGIONS: CPT | Mod: GP | Performed by: PHYSICAL THERAPIST

## 2022-07-15 NOTE — PROGRESS NOTES
Date 7/15/22 7/8/22 6/30/22 6/20/22   Exercise       Supine wand shoulder flex  Reviewed.  Pt may bend elbow returning to neutral if painful.  discussed Hold 5-10 s X 3-5 reps   Wall walk flex for shld  Hold 30 seconds X 2 discussed Hold 30 seconds X 1-3 reps   Standing wand shld IR/EXT  discussed  Hold 30 seconds X 1-3 reps   Chin tuck and scapular retraction  Will discharge chin tuck  Will continue scapular retraction Hold 10 seconds X 5-10 reps    Doorway pec stretch  Hold 30 seconds X 2 Hold 30 seconds X 1-3 reps    Shoulder isometrics:  Flex  ext Hold 10 seconds X 5 reps each  Hold 5-10 seconds, X 3-5 reps, mild to mod resistance    UBE 4' 4'     Shoulder isometrics:   Add  abd  ER   IR Hold 10 seconds x 5 reps each ex Hold 5-10 seconds X 3-5 reps, mild to mod resistance                                   Assessment:  Pt returns and feels her shoulder continues to feel better each day.  She did not have a chance to do the new ex as she has been busy this week so reviewed a few repetitions of each in the clinic.  She demonstrated a good understanding of all.  At the end of the manual therapy pt felt she was painfree.  Will have one more visit in a few weeks.  If she continues to have less pain will discharge at that time.          Khushbu Nogueira, PT  7/15/22

## 2022-08-03 ENCOUNTER — HOSPITAL ENCOUNTER (OUTPATIENT)
Dept: PHYSICAL THERAPY | Facility: REHABILITATION | Age: 87
Discharge: HOME | End: 2022-08-03
Payer: MEDICARE

## 2022-08-03 DIAGNOSIS — M75.102 ROTATOR CUFF SYNDROME, LEFT: Primary | ICD-10-CM

## 2022-08-03 PROCEDURE — 97110 THERAPEUTIC EXERCISES: CPT | Mod: GP | Performed by: PHYSICAL THERAPIST

## 2022-08-03 NOTE — PROGRESS NOTES
[facility]    Outpatient Physical Therapy Discharge Note  Patient: Kim Valdes  : 1935    Beginning/End Dates of Reporting Period:  22 to 8/3/22    Referring Provider: Dr. Way    Therapy Diagnosis: Left shoulder Pain     Client Self Report: Overall my shoulder is feeling much better.  Think I am ready to be done with therapy.    Objective Measurements:  Objective Measure: pain in shoulder: 0-.5/10,     Objective Measure: SPADI: 5 8/3/22        Goals:  Goal Identifier reach up   Goal Description Pt will be able to reach up with left arm to put objects in cupboard as strength improves by 1/2 grade   Target Date 22   Date Met  22   Progress (detail required for progress note): Pt no longer has pain with this and does not have difficulty.  goal met     Goal Identifier reach behind   Goal Description Pt will be able to reach behind to tuck shirt in as AROm improves by 10 degrees   Target Date 22   Date Met  22   Progress (detail required for progress note): Pt is able to tuck her shirt in with no pain or difficulty.  Her AROM improved with all measurements.  Goal met       Plan:  Discharge from therapy.    Discharge:    Reason for Discharge: Patient has met all goals.    Equipment Issued:     Discharge Plan: Patient to continue home program.    8/3/22   Left Shoulder Flexion AROM 144   Left Shoulder Abduction AROM 122, pain in elbow   Left Shoulder ER AROM 85   Left Shoulder IR AROM 58, pain   Left Shoulder IR PROM shld IR/ext: R=T6, L=t10   Left Shoulder Flexion Strength 5   Left Shoulder Abduction Strength 5   Left Shoulder ER Strength 5   Left Shoulder IR Strength 5   Left Shoulder Extension Strength left elbow strength: 5     22  Left Shoulder Flexion AROM 110, pain   Left Shoulder Abduction AROM 98, pain   Left Shoulder ER AROM 50   Left Shoulder IR AROM 24, pain    Left Shoulder IR PROM shld IR/ext: R=T6, L=t10   Left Shoulder Flexion Strength 5-   Left Shoulder Abduction Strength 4+, pain   Left Shoulder ER Strength pain   Left Shoulder IR Strength pain   Left Shoulder Extension Strength left elbow strength: limited due to pain       Date 8/3/22 7/15/22 7/8/22 6/30/22 6/20/22   Exercise        Supine wand shoulder flex discussed  Reviewed.  Pt may bend elbow returning to neutral if painful.  discussed Hold 5-10 s X 3-5 reps   Wall walk flex for shld discussed  Hold 30 seconds X 2 discussed Hold 30 seconds X 1-3 reps   Standing wand shld IR/EXT reviewed  discussed  Hold 30 seconds X 1-3 reps   Chin tuck and scapular retraction Scapular retraction review  Will discharge chin tuck  Will continue scapular retraction Hold 10 seconds X 5-10 reps    Doorway pec stretch reviewed  Hold 30 seconds X 2 Hold 30 seconds X 1-3 reps    Shoulder isometrics:  Flex  ext  Hold 10 seconds X 5 reps each  Hold 5-10 seconds, X 3-5 reps, mild to mod resistance    UBE 4' 4' 4'     Shoulder isometrics:   Add  abd  ER   IR Reviewed all Hold 10 seconds x 5 reps each ex Hold 5-10 seconds X 3-5 reps, mild to mod resistance                                       Assessment:  Pt returns and feels her shoulder is doing well and she is ready to be discharged.  Her AROM measurements improved as well as her strength.  She will get a twinge here and there but overall feels much improved.  She is discharged with goals achieved.       Khushbu Nogueira, PT  8/3/22

## 2022-09-25 ENCOUNTER — HEALTH MAINTENANCE LETTER (OUTPATIENT)
Age: 87
End: 2022-09-25

## 2022-11-02 DIAGNOSIS — E78.5 HYPERLIPIDEMIA LDL GOAL <100: ICD-10-CM

## 2022-11-03 RX ORDER — ATORVASTATIN CALCIUM 10 MG/1
10 TABLET, FILM COATED ORAL AT BEDTIME
Qty: 90 TABLET | Refills: 3 | Status: SHIPPED | OUTPATIENT
Start: 2022-11-03 | End: 2023-01-01

## 2022-11-03 NOTE — TELEPHONE ENCOUNTER
"Routing refill request to provider for review/approval because:  Labs not current:  LDL    Last Written Prescription Date:  7/13/22  Last Fill Quantity: 90,  # refills: 0   Last office visit provider:  4/27/22     Requested Prescriptions   Pending Prescriptions Disp Refills     atorvastatin (LIPITOR) 10 MG tablet [Pharmacy Med Name: Atorvastatin Calcium 10 MG Oral Tablet] 90 tablet 0     Sig: TAKE 1 TABLET BY MOUTH AT BEDTIME       Statins Protocol Failed - 11/3/2022 11:28 AM        Failed - LDL on file in past 12 months     Recent Labs   Lab Test 10/26/21  1252   LDL 90             Passed - No abnormal creatine kinase in past 12 months     No lab results found.             Passed - Recent (12 mo) or future (30 days) visit within the authorizing provider's specialty     Patient has had an office visit with the authorizing provider or a provider within the authorizing providers department within the previous 12 mos or has a future within next 30 days. See \"Patient Info\" tab in inbasket, or \"Choose Columns\" in Meds & Orders section of the refill encounter.              Passed - Medication is active on med list        Passed - Patient is age 18 or older        Passed - No active pregnancy on record        Passed - No positive pregnancy test in past 12 months             Mustapha Fu RN 11/03/22 11:28 AM  "

## 2022-11-08 ENCOUNTER — OFFICE VISIT (OUTPATIENT)
Dept: INTERNAL MEDICINE | Facility: CLINIC | Age: 87
End: 2022-11-08
Payer: MEDICARE

## 2022-11-08 VITALS
WEIGHT: 126.1 LBS | HEIGHT: 66 IN | SYSTOLIC BLOOD PRESSURE: 138 MMHG | HEART RATE: 62 BPM | DIASTOLIC BLOOD PRESSURE: 70 MMHG | BODY MASS INDEX: 20.27 KG/M2 | TEMPERATURE: 97.6 F | OXYGEN SATURATION: 99 %

## 2022-11-08 DIAGNOSIS — I47.10 SVT (SUPRAVENTRICULAR TACHYCARDIA) (H): ICD-10-CM

## 2022-11-08 DIAGNOSIS — E78.00 HYPERCHOLESTEREMIA: ICD-10-CM

## 2022-11-08 DIAGNOSIS — I10 ESSENTIAL HYPERTENSION: ICD-10-CM

## 2022-11-08 DIAGNOSIS — Z23 HIGH PRIORITY FOR COVID-19 VACCINATION: ICD-10-CM

## 2022-11-08 DIAGNOSIS — Z00.00 ROUTINE GENERAL MEDICAL EXAMINATION AT A HEALTH CARE FACILITY: Primary | ICD-10-CM

## 2022-11-08 DIAGNOSIS — Z23 INFLUENZA VACCINATION ADMINISTERED AT CURRENT VISIT: ICD-10-CM

## 2022-11-08 LAB
ALBUMIN SERPL BCG-MCNC: 4.3 G/DL (ref 3.5–5.2)
ALP SERPL-CCNC: 68 U/L (ref 35–104)
ALT SERPL W P-5'-P-CCNC: 18 U/L (ref 10–35)
ANION GAP SERPL CALCULATED.3IONS-SCNC: 12 MMOL/L (ref 7–15)
AST SERPL W P-5'-P-CCNC: 30 U/L (ref 10–35)
BILIRUB SERPL-MCNC: 0.7 MG/DL
BUN SERPL-MCNC: 28.8 MG/DL (ref 8–23)
CALCIUM SERPL-MCNC: 9.3 MG/DL (ref 8.8–10.2)
CHLORIDE SERPL-SCNC: 102 MMOL/L (ref 98–107)
CHOLEST SERPL-MCNC: 161 MG/DL
CREAT SERPL-MCNC: 1.19 MG/DL (ref 0.51–0.95)
DEPRECATED HCO3 PLAS-SCNC: 26 MMOL/L (ref 22–29)
ERYTHROCYTE [DISTWIDTH] IN BLOOD BY AUTOMATED COUNT: 14.3 % (ref 10–15)
GFR SERPL CREATININE-BSD FRML MDRD: 44 ML/MIN/1.73M2
GLUCOSE SERPL-MCNC: 108 MG/DL (ref 70–99)
HCT VFR BLD AUTO: 41.8 % (ref 35–47)
HDLC SERPL-MCNC: 74 MG/DL
HGB BLD-MCNC: 13.9 G/DL (ref 11.7–15.7)
LDLC SERPL CALC-MCNC: 69 MG/DL
MCH RBC QN AUTO: 32.6 PG (ref 26.5–33)
MCHC RBC AUTO-ENTMCNC: 33.3 G/DL (ref 31.5–36.5)
MCV RBC AUTO: 98 FL (ref 78–100)
NONHDLC SERPL-MCNC: 87 MG/DL
PLATELET # BLD AUTO: 197 10E3/UL (ref 150–450)
POTASSIUM SERPL-SCNC: 4.4 MMOL/L (ref 3.4–5.3)
PROT SERPL-MCNC: 7.8 G/DL (ref 6.4–8.3)
RBC # BLD AUTO: 4.27 10E6/UL (ref 3.8–5.2)
SODIUM SERPL-SCNC: 140 MMOL/L (ref 136–145)
TRIGL SERPL-MCNC: 89 MG/DL
TSH SERPL DL<=0.005 MIU/L-ACNC: 2.07 UIU/ML (ref 0.3–4.2)
WBC # BLD AUTO: 7.3 10E3/UL (ref 4–11)

## 2022-11-08 PROCEDURE — 84443 ASSAY THYROID STIM HORMONE: CPT | Performed by: INTERNAL MEDICINE

## 2022-11-08 PROCEDURE — 80053 COMPREHEN METABOLIC PANEL: CPT | Performed by: INTERNAL MEDICINE

## 2022-11-08 PROCEDURE — 36415 COLL VENOUS BLD VENIPUNCTURE: CPT | Performed by: INTERNAL MEDICINE

## 2022-11-08 PROCEDURE — 91312 COVID-19,PF,PFIZER BOOSTER BIVALENT: CPT | Performed by: INTERNAL MEDICINE

## 2022-11-08 PROCEDURE — 85027 COMPLETE CBC AUTOMATED: CPT | Performed by: INTERNAL MEDICINE

## 2022-11-08 PROCEDURE — G0439 PPPS, SUBSEQ VISIT: HCPCS | Performed by: INTERNAL MEDICINE

## 2022-11-08 PROCEDURE — G0008 ADMIN INFLUENZA VIRUS VAC: HCPCS | Performed by: INTERNAL MEDICINE

## 2022-11-08 PROCEDURE — 80061 LIPID PANEL: CPT | Performed by: INTERNAL MEDICINE

## 2022-11-08 PROCEDURE — 0124A COVID-19,PF,PFIZER BOOSTER BIVALENT: CPT | Performed by: INTERNAL MEDICINE

## 2022-11-08 PROCEDURE — 90662 IIV NO PRSV INCREASED AG IM: CPT | Performed by: INTERNAL MEDICINE

## 2022-11-08 ASSESSMENT — ENCOUNTER SYMPTOMS
ARTHRALGIAS: 1
SORE THROAT: 0
HEADACHES: 0
DIARRHEA: 0
HEARTBURN: 0
EYE PAIN: 0
WEAKNESS: 0
SHORTNESS OF BREATH: 0
CHILLS: 0
PARESTHESIAS: 0
FEVER: 0
NERVOUS/ANXIOUS: 1
DYSURIA: 0
JOINT SWELLING: 0
HEMATURIA: 0
BREAST MASS: 0
FREQUENCY: 0
NAUSEA: 0
CONSTIPATION: 1
HEMATOCHEZIA: 0
COUGH: 0
PALPITATIONS: 1
MYALGIAS: 1
DIZZINESS: 0

## 2022-11-08 ASSESSMENT — ACTIVITIES OF DAILY LIVING (ADL): CURRENT_FUNCTION: NO ASSISTANCE NEEDED

## 2022-11-08 NOTE — PATIENT INSTRUCTIONS
Annual wellness visit, Kim comes to today's visit accompanied by her  Lon.  They have made a big decision this year to sell their home in [place] and move into senior living at [place].  They will be in a 2 bedroom apartment, which includes a solarium.  Kim still drives.  They are actually going to move in this coming Friday, November 11.    She is generally feeling well, but has been experiencing a little bit of anxiety about the relocation.    She has noticed more palpitations in the last month, and therefore I am going to order for her a 30-day event monitor, specifically to detect any signs of atrial fibrillation.    I am going to keep her medications the same for now.    Today we will give her Pfizer COVID-19 bivalent booster, and also seasonal flu shot.    Routine lab work of comprehensive metabolic panel, blood cell counts, lipid panel, TSH thyroid test.    She is going to have her eye exam coming up in a couple weeks    Irregular heartbeats-- noticed more in last month  We will get 30-day event monitor to assess for any atrial fibrillation  Last Sunday lasted half an hour    EKG 10- confirms to be underlying sinus rhythm with frequent PACs and single PVCs  Supraventricular tachycardia on Cartia XT for the last 2 years.  She only gets occasional brief fluttering episodes, nothing sustained.    Left shoulder pain-- doing better-- after a fall December 2021  Slipped on black ice December 26, 2021, sustaining a contusion of her left upper arm, possibly injuring her shoulder.  As of November 8, 2022, she is recovered good range of motion of the left shoulder, able to raise her arms all the way up over her head.    Advanced lumbar spondylosis and central canal stenosis with Neurogenic claudication and right lower extremity radiculopathic symptoms with subtle findings of distal muscle weakness  [facility] 10/5/2021, received a right sided epidural steroid injection  directed at the right L5 nerve root, additional diagnosis of L4-5 spondylolisthesis with severe central canal stenosis and neurogenic claudication, also severe L4-S1 facet arthritis with inflammation of the right L4 and L5 pedicles     Keep using Swiss rubber ball, which might help her do core muscle strengthening exercises for her lower back and abdominals.  EXAM: MR LUMBAR SPINE WO CONTRAST  LOCATION: [facility]  DATE/TIME: 2/11/2020 2:24 PM     IMPRESSION:   1.  Multilevel degenerative disc disease of the lumbar spine with symmetric disc bulges, most pronounced at the L4/L5 level where there is severe spinal canal and bilateral lateral recess narrowing (AP diameter the spinal canal measuring 3 mm).  2.  Advanced bilateral facet arthropathy and bilateral facet joint effusions at L4/L5. Flexion and extension views could be performed to evaluate for dynamic instability.  3.  Multilevel posterolateral disc disease and facet arthropathy with multilevel neural foraminal narrowing, most pronounced at the L4/L5 level where there is moderate to severe right and moderate left neural foraminal narrowing.     Continue with a conservative management strategy of exercise and maximizing mobility.    Cervical spondylosis, with central canal stenosis at C6-C7 level, with mild compression of the cervical spinal cord, but no evidence of myelomalacia by MRI March 5, 2020  EXAM: MR CERVICAL SPINE WO CONTRAST  LOCATION: [facility]  DATE/TIME: 3/17/2020 1:46 PM  1.  Multilevel degenerative disc disease of the cervical spine with disc osteophyte complexes, most pronounced at the C5/C6 level where there is moderate to severe spinal canal narrowing and compression of the cervical spinal cord and at the C6/C7 level where there is moderate spinal canal narrowing and mild compression of the cervical spinal cord. No definite evidence of edema or myelomalacia at these levels.  2.  Multilevel uncovertebral joint disease and  facet arthropathy with severe multilevel neural foraminal narrowing at C3/C4-C6/C7 as described.    Hypertension with elevated systolic component  BP Readings from Last 6 Encounters:   11/08/22 138/70   04/27/22 134/68   10/26/21 138/68   04/23/21 120/80   09/01/20 (!) 144/70   03/05/20 (!) 149/74     She takes full tablet of the losartan 50/hydrochlorothiazide 12.5.     Current medications are losartan hydrochlorothiazide as a fixed dose combination pill and also she takes Cartia XT for supraventricular tachycardia suppression, but that also has blood pressure lowering effects.    History of benign monoclonal gammopathy.    Serum protein electrophoresis from specimen drawn October 23, 2019 showed no monoclonal protein.  I think we can stop checking.     Hyperlipidemia on treatment  Continue on atorvastatin 10 milligrams a day  10-  LDL Cholesterol Calculated <=129 mg/dL 90      Direct Measure HDL >=50 mg/dL 70      Triglycerides <=149 mg/dL 75      Cholesterol <=199 mg/dL 175      Menopause with history of previous left oophrectomy.  Uterus is still in place.    Probably has at least osteopenia, probably osteoporosis, and I would like her to continue taking calcium and vitamin D supplement.  The amount of calcium is 1000 mg of elemental calcium per day.  The form of calcium can be calcium carbonate (for example Tums) or calcium citrate (Citracal or Caltrate)  The amount of the element calcium is reported on the label of these products.  Also over-the-counter vitamin D 2000 units a day.     History of adenomatous colon polyp.  Last colonoscopy was October 2015, and she has stopped colon screening.  The October 2015 colonoscopy showed diverticulosis, no polyps.  She can stop colon screening at this point.     Vulvar lichen sclerosus, for which she uses topical steroid ointment with good effect.     She has received Prevnar 13 and pneumococcal 23 polysaccharide already.    Immunization History   Administered  Date(s) Administered    COVID-19,PF,Pfizer (12+ Yrs) 02/04/2021, 02/25/2021, 10/26/2021    COVID-19,PF,Pfizer 12+ Yrs (2022 and After) 04/27/2022    Flu, Unspecified 11/06/2007, 10/31/2008, 11/25/2009, 10/20/2010, 11/18/2011    Influenza (High Dose) 3 valent vaccine 10/19/2015, 10/03/2016, 10/17/2017, 10/02/2018, 10/23/2019, 09/23/2021    Influenza Vaccine, 6+MO IM (QUADRIVALENT W/PRESERVATIVES) 11/14/2012, 11/15/2013, 10/10/2014    Influenza, Quad, High Dose, Pf, 65yr+ (Fluzone HD) 10/06/2020    Pneumo Conj 13-V (2010&after) 04/22/2016    Pneumococcal 23 valent 10/30/2002, 12/28/2007    Td,adult,historic,unspecified 12/01/2009    Tdap (Adacel,Boostrix) 12/01/2009, 07/11/2020

## 2022-11-08 NOTE — PROGRESS NOTES
"SUBJECTIVE:   Kim is a 87 year old who presents for Preventive Visit.    Patient has been advised of split billing requirements and indicates understanding: Yes  Are you in the first 12 months of your Medicare coverage?  No    Well Child  Associated symptoms include arthralgias, congestion and myalgias. Pertinent negatives include no chest pain, chills, coughing, fever, headaches, joint swelling, nausea, rash, sore throat or weakness.   Healthy Habits:     In general, how would you rate your overall health?  Good    Frequency of exercise:  2-3 days/week    Duration of exercise:  Less than 15 minutes    Do you usually eat at least 4 servings of fruit and vegetables a day, include whole grains    & fiber and avoid regularly eating high fat or \"junk\" foods?  Yes    Taking medications regularly:  Yes    Ability to successfully perform activities of daily living:  No assistance needed    Home Safety:  No safety concerns identified    Hearing Impairment:  Difficulty understanding soft or whispered speech    In the past 6 months, have you been bothered by leaking of urine?  No    In general, how would you rate your overall mental or emotional health?  Good      PHQ-2 Total Score: 0    Additional concerns today:  Yes    Do you feel safe in your environment? Yes    Have you ever done Advance Care Planning? (For example, a Health Directive, POLST, or a discussion with a medical provider or your loved ones about your wishes): Yes, patient states has an Advance Care Planning document and will bring a copy to the clinic.       Fall risk  Fallen 2 or more times in the past year?: No  Any fall with injury in the past year?: No    Cognitive Screening   1) Repeat 3 items (Leader, Season, Table)    2) Clock draw: NORMAL  3) 3 item recall: Recalls 3 objects  Results: 3 items recalled: COGNITIVE IMPAIRMENT LESS LIKELY    Mini-CogTM Copyright S Bonifacio. Licensed by the author for use in [facility]; reprinted with " permission (zeynep@[facility]). All rights reserved.      Do you have sleep apnea, excessive snoring or daytime drowsiness?: no    Reviewed and updated as needed this visit by clinical staff   Tobacco  Allergies  Meds              Reviewed and updated as needed this visit by Provider                 Social History     Tobacco Use     Smoking status: Never     Smokeless tobacco: Never   Substance Use Topics     Alcohol use: Not on file         Alcohol Use 11/8/2022   Prescreen: >3 drinks/day or >7 drinks/week? No         Current providers sharing in care for this patient include:   Patient Care Team:  Bryan Way MD as PCP - General  Bryan Way MD as Assigned PCP    The following health maintenance items are reviewed in Epic and correct as of today:  Health Maintenance   Topic Date Due     ANNUAL REVIEW OF HM ORDERS  Never done     HEPATITIS B IMMUNIZATION (1 of 3 - 3-dose series) Never done     ZOSTER IMMUNIZATION (1 of 2) Never done     COVID-19 Vaccine (5 - Booster for Pfizer series) 06/22/2022     INFLUENZA VACCINE (1) 09/01/2022     MEDICARE ANNUAL WELLNESS VISIT  10/26/2022     FALL RISK ASSESSMENT  11/08/2023     ADVANCE CARE PLANNING  11/08/2027     DTAP/TDAP/TD IMMUNIZATION (2 - Td or Tdap) 07/11/2030     PHQ-2 (once per calendar year)  Completed     Pneumococcal Vaccine: 65+ Years  Completed     IPV IMMUNIZATION  Aged Out     MENINGITIS IMMUNIZATION  Aged Out       Pertinent mammograms are reviewed under the imaging tab.    Review of Systems   Constitutional: Negative for chills and fever.   HENT: Positive for congestion. Negative for ear pain, hearing loss and sore throat.    Eyes: Negative for pain and visual disturbance.   Respiratory: Negative for cough and shortness of breath.    Cardiovascular: Positive for palpitations. Negative for chest pain and peripheral edema.   Gastrointestinal: Positive for constipation. Negative for diarrhea, heartburn, hematochezia and nausea.   Breasts:   "Negative for tenderness, breast mass and discharge.   Genitourinary: Negative for dysuria, frequency, genital sores, hematuria, pelvic pain, urgency, vaginal bleeding and vaginal discharge.   Musculoskeletal: Positive for arthralgias and myalgias. Negative for joint swelling.   Skin: Negative for rash.   Neurological: Negative for dizziness, weakness, headaches and paresthesias.   Psychiatric/Behavioral: Negative for mood changes. The patient is nervous/anxious.          OBJECTIVE:   /70 (BP Location: Right arm, Patient Position: Sitting, Cuff Size: Adult Regular)   Pulse 62   Temp 97.6  F (36.4  C) (Oral)   Ht 1.664 m (5' 5.5\")   Wt 57.2 kg (126 lb 1.6 oz)   SpO2 99%   BMI 20.66 kg/m   Estimated body mass index is 20.66 kg/m  as calculated from the following:    Height as of this encounter: 1.664 m (5' 5.5\").    Weight as of this encounter: 57.2 kg (126 lb 1.6 oz).  Physical Exam    General: Alert, in no distress  Skin: No significant lesion seen.  Eyes/nose/throat: Eyes without scleral icterus, eye movements normal, pupils equal and reactive, oropharynx clear, ears with normal TM's  + Bilateral hearing aids  MSK: Neck with good ROM  Lymphatic: Neck without adenopathy or masses  Endocrine: Thyroid with no nodules to palpation  Pulm: Lungs clear to auscultation bilaterally  Cardiac:   + Mostly regular rhythm with occasional skipped, 2/6 systolic ejection murmur which I think is aortic sclerosis, doubt stenosis  GI: Abdomen soft, nontender. No palpable enlargement of liver or spleen  MSK: Extremities no tenderness or edema  + Venous telangiectasias both legs, but no edema  Neuro: Moves all extremities, without focal weakness  Psych: Alert, normal mental status. Normal affect and speech      ASSESSMENT / PLAN:     Annual wellness visit, Kim comes to today's visit accompanied by her  Lon.  They have made a big decision this year to sell their home in [email or web address] and move into senior " living at [place].  They will be in a 2 bedroom apartment, which includes a solarium.  Kim still drives.  They are actually going to move in this coming Friday, November 11.    She is generally feeling well, but has been experiencing a little bit of anxiety about the relocation.    She has noticed more palpitations in the last month, and therefore I am going to order for her a 30-day event monitor, specifically to detect any signs of atrial fibrillation.    I am going to keep her medications the same for now.    Today we will give her Pfizer COVID-19 bivalent booster, and also seasonal flu shot.    Routine lab work of comprehensive metabolic panel, blood cell counts, lipid panel, TSH thyroid test.    She is going to have her eye exam coming up in a couple weeks    Irregular heartbeats-- noticed more in last month  We will get 30-day event monitor to assess for any atrial fibrillation  Last Sunday lasted half an hour    EKG 10- confirms to be underlying sinus rhythm with frequent PACs and single PVCs  Supraventricular tachycardia on Cartia XT for the last 2 years.  She only gets occasional brief fluttering episodes, nothing sustained.    Left shoulder pain-- doing better-- after a fall December 2021  Slipped on black ice December 26, 2021, sustaining a contusion of her left upper arm, possibly injuring her shoulder.  As of November 8, 2022, she is recovered good range of motion of the left shoulder, able to raise her arms all the way up over her head.    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  EXAM: MR LUMBAR SPINE WO CONTRAST  LOCATION: [facility]  DATE/TIME: 2/11/2020 2:24 PM     IMPRESSION:   1.  Multilevel degenerative disc disease of the lumbar spine with symmetric disc bulges, most pronounced at the L4/L5 level where there is severe spinal canal and bilateral lateral recess narrowing (AP diameter the spinal canal measuring 3 mm).  2.  Advanced bilateral facet arthropathy and bilateral facet joint effusions at L4/L5. Flexion and extension views could be performed to evaluate for dynamic instability.  3.  Multilevel posterolateral disc disease and facet arthropathy with multilevel neural foraminal narrowing, most pronounced at the L4/L5 level where there is moderate to severe right and moderate left neural foraminal narrowing.     Continue with a conservative management strategy of exercise and maximizing mobility.    Cervical spondylosis, with central canal stenosis at C6-C7 level, with mild compression of the cervical spinal cord, but no evidence of myelomalacia by MRI March 5, 2020  EXAM: MR CERVICAL SPINE WO CONTRAST  LOCATION: [facility]  DATE/TIME: 3/17/2020 1:46 PM  1.  Multilevel degenerative disc disease of the cervical spine with disc osteophyte complexes, most pronounced at the C5/C6 level where there is moderate to severe spinal canal narrowing and compression of the cervical spinal cord and at the C6/C7 level where there is moderate spinal canal narrowing and mild compression of the cervical spinal cord. No definite evidence of edema or myelomalacia at these levels.  2.  Multilevel uncovertebral joint disease and facet arthropathy with severe multilevel neural foraminal narrowing at C3/C4-C6/C7 as described.    Hypertension with elevated systolic component  BP Readings from Last 6 Encounters:    11/08/22 138/70   04/27/22 134/68   10/26/21 138/68   04/23/21 120/80   09/01/20 (!) 144/70   03/05/20 (!) 149/74     She takes full tablet of the losartan 50/hydrochlorothiazide 12.5.     Current medications are losartan hydrochlorothiazide as a fixed dose combination pill and also she takes Cartia XT for supraventricular tachycardia suppression, but that also has blood pressure lowering effects.    History of benign monoclonal gammopathy.    Serum protein electrophoresis from specimen drawn October 23, 2019 showed no monoclonal protein.  I think we can stop checking.     Hyperlipidemia on treatment  Continue on atorvastatin 10 milligrams a day  10-  LDL Cholesterol Calculated <=129 mg/dL 90      Direct Measure HDL >=50 mg/dL 70      Triglycerides <=149 mg/dL 75      Cholesterol <=199 mg/dL 175      Menopause with history of previous left oophrectomy.  Uterus is still in place.    Probably has at least osteopenia, probably osteoporosis, and I would like her to continue taking calcium and vitamin D supplement.  The amount of calcium is 1000 mg of elemental calcium per day.  The form of calcium can be calcium carbonate (for example Tums) or calcium citrate (Citracal or Caltrate)  The amount of the element calcium is reported on the label of these products.  Also over-the-counter vitamin D 2000 units a day.     History of adenomatous colon polyp.  Last colonoscopy was October 2015, and she has stopped colon screening.  The October 2015 colonoscopy showed diverticulosis, no polyps.  She can stop colon screening at this point.     Vulvar lichen sclerosus, for which she uses topical steroid ointment with good effect.     She has received Prevnar 13 and pneumococcal 23 polysaccharide already.    Immunization History   Administered Date(s) Administered     COVID-19,PF,Pfizer (12+ Yrs) 02/04/2021, 02/25/2021, 10/26/2021     COVID-19,PF,Pfizer 12+ Yrs (2022 and After) 04/27/2022     Flu, Unspecified 11/06/2007,  "10/31/2008, 11/25/2009, 10/20/2010, 11/18/2011     Influenza (High Dose) 3 valent vaccine 10/19/2015, 10/03/2016, 10/17/2017, 10/02/2018, 10/23/2019, 09/23/2021     Influenza Vaccine, 6+MO IM (QUADRIVALENT W/PRESERVATIVES) 11/14/2012, 11/15/2013, 10/10/2014     Influenza, Quad, High Dose, Pf, 65yr+ (Fluzone HD) 10/06/2020     Pneumo Conj 13-V (2010&after) 04/22/2016     Pneumococcal 23 valent 10/30/2002, 12/28/2007     Td,adult,historic,unspecified 12/01/2009     Tdap (Adacel,Boostrix) 12/01/2009, 07/11/2020                 COUNSELING:  Reviewed preventive health counseling, as reflected in patient instructions       Healthy diet/nutrition    Estimated body mass index is 20.66 kg/m  as calculated from the following:    Height as of this encounter: 1.664 m (5' 5.5\").    Weight as of this encounter: 57.2 kg (126 lb 1.6 oz).      She reports that she has never smoked. She has never used smokeless tobacco.      Appropriate preventive services were discussed with this patient, including applicable screening as appropriate for cardiovascular disease, diabetes, osteopenia/osteoporosis, and glaucoma.  As appropriate for age/gender, discussed screening for colorectal cancer, prostate cancer, breast cancer, and cervical cancer. Checklist reviewing preventive services available has been given to the patient.    Reviewed patients plan of care and provided an AVS. The Basic Care Plan (routine screening as documented in Health Maintenance) for Kim meets the Care Plan requirement. This Care Plan has been established and reviewed with the Patient.        GANGA FRANCIS MD  [facility]      "

## 2022-11-25 ENCOUNTER — NURSE TRIAGE (OUTPATIENT)
Dept: NURSING | Facility: CLINIC | Age: 87
End: 2022-11-25

## 2022-11-25 NOTE — TELEPHONE ENCOUNTER
Triage Call:    Pt calling to find out what the quarantine guidelines are for COVID-19 and for some care advice for her cough.     Had COVID-19 for 10 days    Cough and nasal congestion  No fever, no shortness of breath, no chest pain    Disposition: See in office within 3 days. Pt was given the care advice and is aware if [place] [facility] [place] decides to go in an be seen.     Janie Gates RN  [facility] Nurse Advisor 9:46 AM 11/25/2022    How can I protect others?    These guidelines are for isolating before returning to work, school or .       If you DO have symptoms:  o Stay home and away from others  - For at least 5 days after your symptoms started, AND   - You are fever free for 24 hours (with no medicine that reduces fever), AND  - Your other symptoms are better.  o Wear a mask for 10 full days any time you are around others.    If you DON'T have symptoms:  o Stay at home and away from others for at least 5 days after your positive test.  o Wear a mask for 10 full days any time you are around others.    There may be different guidelines for healthcare facilities. Please check with the specific sites before arriving.     If you've been told by a doctor that you were severely ill with COVID-19 or are immunocompromised, you should isolate for at least 10 days.    You should not go back to work until you meet the guidelines above for ending your home isolation. You don't need to be retested for COVID-19 before going back to work--studies show that you won't spread the virus if it's been at least 10 days since your symptoms started (or 20 days, if you have a weak immune system).        Reason for Disposition    Nasal discharge present > 10 days    Additional Information    Negative: Bluish (or gray) lips or face    Negative: SEVERE difficulty breathing (e.g., struggling for each breath, speaks in single words)    Negative: Rapid onset of cough and has hives    Negative: Coughing started suddenly after  medicine, an allergic food or bee sting    Negative: Difficulty breathing after exposure to flames, smoke, or fumes    Negative: Sounds like a life-threatening emergency to the triager    Negative: Previous asthma attacks and this feels like asthma attack    Negative: Dry cough (non-productive; no sputum or minimal clear sputum) and within 14 days of COVID-19 Exposure    Negative: MODERATE difficulty breathing (e.g., speaks in phrases, SOB even at rest, pulse 100-120) and still present when not coughing    Negative: Chest pain present when not coughing    Negative: Passed out (i.e., fainted, collapsed and was not responding)    Negative: Patient sounds very sick or weak to the triager    Negative: MILD difficulty breathing (e.g., minimal/no SOB at rest, SOB with walking, pulse <100) and still present when not coughing    Negative: Fever > 103 F (39.4 C)    Negative: Fever > 101 F (38.3 C) and over 60 years of age    Negative: Fever > 100.0 F (37.8 C) and has diabetes mellitus or a weak immune system (e.g., HIV positive, cancer chemotherapy, organ transplant, splenectomy, chronic steroids)    Negative: Fever > 100.0 F (37.8 C) and bedridden (e.g., nursing home patient, stroke, chronic illness, recovering from surgery)    Negative: Coughed up > 1 tablespoon (15 ml) blood (Exception: Blood-tinged sputum.)    Negative: Increasing ankle swelling    Negative: Wheezing is present    Negative: SEVERE coughing spells (e.g., whooping sound after coughing, vomiting after coughing)    Negative: Coughing up omid-colored (reddish-brown) or blood-tinged sputum    Negative: Fever present > 3 days (72 hours)    Negative: Fever returns after gone for over 24 hours and symptoms worse or not improved    Negative: Using nasal washes and pain medicine > 24 hours and sinus pain persists    Negative: Known COPD or other severe lung disease (i.e., bronchiectasis, cystic fibrosis, lung surgery) and worsening symptoms (i.e., increased sputum  purulence or amount, increased breathing difficulty)    Negative: Continuous (nonstop) coughing interferes with work or school and no improvement using cough treatment per Care Advice    Negative: Patient wants to be seen    Negative: Cough has been present for > 3 weeks    Negative: Allergy symptoms are also present (e.g., itchy eyes, clear nasal discharge, postnasal drip)    Protocols used: COUGH-A-OH

## 2022-11-28 ENCOUNTER — HOSPITAL ENCOUNTER (OUTPATIENT)
Dept: CARDIOLOGY | Facility: CLINIC | Age: 87
Discharge: HOME | End: 2022-11-28
Attending: INTERNAL MEDICINE | Admitting: INTERNAL MEDICINE
Payer: MEDICARE

## 2022-11-28 DIAGNOSIS — I47.10 SVT (SUPRAVENTRICULAR TACHYCARDIA) (H): ICD-10-CM

## 2022-11-28 PROCEDURE — 93270 REMOTE 30 DAY ECG REV/REPORT: CPT

## 2022-11-29 ENCOUNTER — TRANSFERRED RECORDS (OUTPATIENT)
Dept: HEALTH INFORMATION MANAGEMENT | Facility: CLINIC | Age: 87
End: 2022-11-29

## 2022-12-29 ENCOUNTER — DOCUMENTATION ONLY (OUTPATIENT)
Dept: INTERNAL MEDICINE | Facility: CLINIC | Age: 87
End: 2022-12-29

## 2022-12-29 PROCEDURE — 93228 REMOTE 30 DAY ECG REV/REPORT: CPT | Performed by: INTERNAL MEDICINE

## 2022-12-29 NOTE — PROGRESS NOTES
"[employer] message to patient regarding event recorder report    \"The most important good news is that the 1-month monitor showed NO ATRIAL FIBRILLATION.  That was the main piece of information I wanted from this study.    \"You did have a few episodes of the supraventricular tachycardia, which we already knew about, and that is the reason that you take the Cartia XT.    \"When you are experiencing symptoms, the monitor detected some of the supraventricular tachycardia, but at other times the rhythm was actually normal.    \"I think this test is reassuring.  But let me know if palpitations become more bothersome, because then I might have you visit with cardiology and consider additional investigation.\"    "

## 2023-01-01 ENCOUNTER — TELEPHONE (OUTPATIENT)
Dept: INTERNAL MEDICINE | Facility: CLINIC | Age: 88
End: 2023-01-01
Payer: MEDICARE

## 2023-01-01 ENCOUNTER — HOSPITAL ENCOUNTER (EMERGENCY)
Facility: CLINIC | Age: 88
Discharge: HOME | End: 2023-08-21
Attending: EMERGENCY MEDICINE | Admitting: EMERGENCY MEDICINE
Payer: MEDICARE

## 2023-01-01 ENCOUNTER — MEDICAL CORRESPONDENCE (OUTPATIENT)
Dept: HEALTH INFORMATION MANAGEMENT | Facility: CLINIC | Age: 88
End: 2023-01-01

## 2023-01-01 ENCOUNTER — TRANSFERRED RECORDS (OUTPATIENT)
Dept: HEALTH INFORMATION MANAGEMENT | Facility: CLINIC | Age: 88
End: 2023-01-01
Payer: MEDICARE

## 2023-01-01 ENCOUNTER — DOCUMENTATION ONLY (OUTPATIENT)
Dept: CARDIOLOGY | Facility: CLINIC | Age: 88
End: 2023-01-01
Payer: MEDICARE

## 2023-01-01 ENCOUNTER — OFFICE VISIT (OUTPATIENT)
Dept: CARDIOLOGY | Facility: CLINIC | Age: 88
End: 2023-01-01
Attending: EMERGENCY MEDICINE
Payer: MEDICARE

## 2023-01-01 ENCOUNTER — OFFICE VISIT (OUTPATIENT)
Dept: CARDIOLOGY | Facility: CLINIC | Age: 88
End: 2023-01-01
Payer: MEDICARE

## 2023-01-01 ENCOUNTER — TELEPHONE (OUTPATIENT)
Dept: CARDIOLOGY | Facility: CLINIC | Age: 88
End: 2023-01-01
Payer: MEDICARE

## 2023-01-01 ENCOUNTER — OFFICE VISIT (OUTPATIENT)
Dept: INTERNAL MEDICINE | Facility: CLINIC | Age: 88
End: 2023-01-01
Payer: MEDICARE

## 2023-01-01 ENCOUNTER — NURSE TRIAGE (OUTPATIENT)
Dept: NURSING | Facility: CLINIC | Age: 88
End: 2023-01-01
Payer: MEDICARE

## 2023-01-01 ENCOUNTER — HOSPITAL ENCOUNTER (OUTPATIENT)
Dept: CARDIOLOGY | Facility: CLINIC | Age: 88
Discharge: HOME | End: 2023-08-28
Attending: INTERNAL MEDICINE | Admitting: INTERNAL MEDICINE
Payer: MEDICARE

## 2023-01-01 ENCOUNTER — TRANSITIONAL CARE UNIT VISIT (OUTPATIENT)
Dept: GERIATRICS | Facility: CLINIC | Age: 88
End: 2023-01-01
Payer: MEDICARE

## 2023-01-01 ENCOUNTER — TELEPHONE (OUTPATIENT)
Dept: INTERNAL MEDICINE | Facility: CLINIC | Age: 88
End: 2023-01-01

## 2023-01-01 VITALS
TEMPERATURE: 98.2 F | RESPIRATION RATE: 20 BRPM | SYSTOLIC BLOOD PRESSURE: 124 MMHG | DIASTOLIC BLOOD PRESSURE: 58 MMHG | BODY MASS INDEX: 19.13 KG/M2 | HEIGHT: 66 IN | WEIGHT: 119 LBS | HEART RATE: 52 BPM | OXYGEN SATURATION: 99 %

## 2023-01-01 VITALS
TEMPERATURE: 98.1 F | SYSTOLIC BLOOD PRESSURE: 128 MMHG | BODY MASS INDEX: 19.13 KG/M2 | OXYGEN SATURATION: 95 % | WEIGHT: 119 LBS | RESPIRATION RATE: 20 BRPM | DIASTOLIC BLOOD PRESSURE: 58 MMHG | HEART RATE: 56 BPM | HEIGHT: 66 IN

## 2023-01-01 VITALS
HEIGHT: 66 IN | RESPIRATION RATE: 16 BRPM | DIASTOLIC BLOOD PRESSURE: 62 MMHG | WEIGHT: 123 LBS | BODY MASS INDEX: 19.77 KG/M2 | SYSTOLIC BLOOD PRESSURE: 128 MMHG | TEMPERATURE: 97.7 F | OXYGEN SATURATION: 97 % | HEART RATE: 74 BPM

## 2023-01-01 VITALS
TEMPERATURE: 98.5 F | HEIGHT: 66 IN | RESPIRATION RATE: 16 BRPM | DIASTOLIC BLOOD PRESSURE: 72 MMHG | HEART RATE: 79 BPM | OXYGEN SATURATION: 96 % | SYSTOLIC BLOOD PRESSURE: 110 MMHG | WEIGHT: 118 LBS | BODY MASS INDEX: 18.96 KG/M2

## 2023-01-01 VITALS
TEMPERATURE: 97.6 F | HEART RATE: 51 BPM | HEIGHT: 66 IN | WEIGHT: 118.6 LBS | DIASTOLIC BLOOD PRESSURE: 58 MMHG | RESPIRATION RATE: 18 BRPM | OXYGEN SATURATION: 95 % | SYSTOLIC BLOOD PRESSURE: 144 MMHG | BODY MASS INDEX: 19.06 KG/M2

## 2023-01-01 VITALS
SYSTOLIC BLOOD PRESSURE: 156 MMHG | RESPIRATION RATE: 16 BRPM | OXYGEN SATURATION: 98 % | BODY MASS INDEX: 19.29 KG/M2 | DIASTOLIC BLOOD PRESSURE: 50 MMHG | WEIGHT: 120 LBS | HEIGHT: 66 IN | HEART RATE: 63 BPM

## 2023-01-01 VITALS
BODY MASS INDEX: 19.21 KG/M2 | WEIGHT: 119 LBS | OXYGEN SATURATION: 97 % | SYSTOLIC BLOOD PRESSURE: 160 MMHG | HEART RATE: 70 BPM | RESPIRATION RATE: 16 BRPM | DIASTOLIC BLOOD PRESSURE: 55 MMHG

## 2023-01-01 DIAGNOSIS — I10 ESSENTIAL HYPERTENSION: ICD-10-CM

## 2023-01-01 DIAGNOSIS — E78.00 HYPERCHOLESTEREMIA: ICD-10-CM

## 2023-01-01 DIAGNOSIS — I47.10 SVT (SUPRAVENTRICULAR TACHYCARDIA) (H): Primary | ICD-10-CM

## 2023-01-01 DIAGNOSIS — Z00.00 ROUTINE GENERAL MEDICAL EXAMINATION AT A HEALTH CARE FACILITY: Primary | ICD-10-CM

## 2023-01-01 DIAGNOSIS — M62.81 GENERALIZED MUSCLE WEAKNESS: ICD-10-CM

## 2023-01-01 DIAGNOSIS — R00.0 TACHYCARDIA: ICD-10-CM

## 2023-01-01 DIAGNOSIS — E78.5 HYPERLIPIDEMIA LDL GOAL <100: ICD-10-CM

## 2023-01-01 DIAGNOSIS — R00.1 BRADYCARDIA: ICD-10-CM

## 2023-01-01 DIAGNOSIS — I47.10 SVT (SUPRAVENTRICULAR TACHYCARDIA) (H): ICD-10-CM

## 2023-01-01 DIAGNOSIS — Z23 NEEDS FLU SHOT: ICD-10-CM

## 2023-01-01 DIAGNOSIS — N28.9 RENAL INSUFFICIENCY: ICD-10-CM

## 2023-01-01 DIAGNOSIS — Z23 HIGH PRIORITY FOR COVID-19 VACCINATION: ICD-10-CM

## 2023-01-01 DIAGNOSIS — R56.9 SEIZURE-LIKE ACTIVITY (H): Primary | ICD-10-CM

## 2023-01-01 LAB
ALBUMIN SERPL BCG-MCNC: 4.3 G/DL (ref 3.5–5.2)
ALBUMIN SERPL-MCNC: 3.8 G/DL (ref 3.5–5)
ALP SERPL-CCNC: 74 U/L (ref 45–120)
ALP SERPL-CCNC: 76 U/L (ref 35–104)
ALT SERPL W P-5'-P-CCNC: 19 U/L (ref 0–45)
ALT SERPL W P-5'-P-CCNC: 21 U/L (ref 0–50)
ANION GAP SERPL CALCULATED.3IONS-SCNC: 10 MMOL/L (ref 5–18)
ANION GAP SERPL CALCULATED.3IONS-SCNC: 9 MMOL/L (ref 7–15)
AST SERPL W P-5'-P-CCNC: 29 U/L (ref 0–40)
AST SERPL W P-5'-P-CCNC: 31 U/L (ref 0–45)
ATRIAL RATE - MUSE: 105 BPM
ATRIAL RATE - MUSE: 62 BPM
BASOPHILS # BLD AUTO: 0 10E3/UL (ref 0–0.2)
BASOPHILS NFR BLD AUTO: 1 %
BILIRUB DIRECT SERPL-MCNC: 0.2 MG/DL
BILIRUB SERPL-MCNC: 0.5 MG/DL
BILIRUB SERPL-MCNC: 0.6 MG/DL (ref 0–1)
BUN SERPL-MCNC: 27.9 MG/DL (ref 8–23)
BUN SERPL-MCNC: 32 MG/DL (ref 8–28)
CALCIUM SERPL-MCNC: 9.5 MG/DL (ref 8.5–10.5)
CALCIUM SERPL-MCNC: 9.9 MG/DL (ref 8.8–10.2)
CHLORIDE BLD-SCNC: 104 MMOL/L (ref 98–107)
CHLORIDE SERPL-SCNC: 106 MMOL/L (ref 98–107)
CO2 SERPL-SCNC: 26 MMOL/L (ref 22–31)
CREAT SERPL-MCNC: 1.1 MG/DL (ref 0.51–0.95)
CREAT SERPL-MCNC: 1.52 MG/DL (ref 0.6–1.1)
DEPRECATED HCO3 PLAS-SCNC: 26 MMOL/L (ref 22–29)
DIASTOLIC BLOOD PRESSURE - MUSE: 65 MMHG
DIASTOLIC BLOOD PRESSURE - MUSE: NORMAL MMHG
EGFRCR SERPLBLD CKD-EPI 2021: 48 ML/MIN/1.73M2
EOSINOPHIL # BLD AUTO: 0.1 10E3/UL (ref 0–0.7)
EOSINOPHIL NFR BLD AUTO: 2 %
ERYTHROCYTE [DISTWIDTH] IN BLOOD BY AUTOMATED COUNT: 14.3 % (ref 10–15)
ERYTHROCYTE [DISTWIDTH] IN BLOOD BY AUTOMATED COUNT: 14.6 % (ref 10–15)
GFR SERPL CREATININE-BSD FRML MDRD: 33 ML/MIN/1.73M2
GLUCOSE BLD-MCNC: 144 MG/DL (ref 70–125)
GLUCOSE SERPL-MCNC: 107 MG/DL (ref 70–99)
HCT VFR BLD AUTO: 39.8 % (ref 35–47)
HCT VFR BLD AUTO: 40.1 % (ref 35–47)
HGB BLD-MCNC: 13.4 G/DL (ref 11.7–15.7)
HGB BLD-MCNC: 13.7 G/DL (ref 11.7–15.7)
HOLD SPECIMEN: NORMAL
IMM GRANULOCYTES # BLD: 0 10E3/UL
IMM GRANULOCYTES NFR BLD: 0 %
INTERPRETATION ECG - MUSE: NORMAL
INTERPRETATION ECG - MUSE: NORMAL
LVEF ECHO: NORMAL
LYMPHOCYTES # BLD AUTO: 1.6 10E3/UL (ref 0.8–5.3)
LYMPHOCYTES NFR BLD AUTO: 23 %
MAGNESIUM SERPL-MCNC: 2 MG/DL (ref 1.8–2.6)
MAGNESIUM SERPL-MCNC: 2.1 MG/DL (ref 1.7–2.3)
MCH RBC QN AUTO: 32.8 PG (ref 26.5–33)
MCH RBC QN AUTO: 33.1 PG (ref 26.5–33)
MCHC RBC AUTO-ENTMCNC: 33.7 G/DL (ref 31.5–36.5)
MCHC RBC AUTO-ENTMCNC: 34.2 G/DL (ref 31.5–36.5)
MCV RBC AUTO: 97 FL (ref 78–100)
MCV RBC AUTO: 97 FL (ref 78–100)
MONOCYTES # BLD AUTO: 0.7 10E3/UL (ref 0–1.3)
MONOCYTES NFR BLD AUTO: 11 %
NEUTROPHILS # BLD AUTO: 4.2 10E3/UL (ref 1.6–8.3)
NEUTROPHILS NFR BLD AUTO: 63 %
NRBC # BLD AUTO: 0 10E3/UL
NRBC BLD AUTO-RTO: 0 /100
P AXIS - MUSE: 63 DEGREES
P AXIS - MUSE: NORMAL DEGREES
PLATELET # BLD AUTO: 176 10E3/UL (ref 150–450)
PLATELET # BLD AUTO: 193 10E3/UL (ref 150–450)
POTASSIUM BLD-SCNC: 4.7 MMOL/L (ref 3.5–5)
POTASSIUM SERPL-SCNC: 4.4 MMOL/L (ref 3.4–5.3)
PR INTERVAL - MUSE: 194 MS
PR INTERVAL - MUSE: NORMAL MS
PROT SERPL-MCNC: 7.4 G/DL (ref 6–8)
PROT SERPL-MCNC: 7.7 G/DL (ref 6.4–8.3)
QRS DURATION - MUSE: 72 MS
QRS DURATION - MUSE: 80 MS
QT - MUSE: 308 MS
QT - MUSE: 406 MS
QTC - MUSE: 412 MS
QTC - MUSE: 451 MS
R AXIS - MUSE: 17 DEGREES
R AXIS - MUSE: 35 DEGREES
RBC # BLD AUTO: 4.09 10E6/UL (ref 3.8–5.2)
RBC # BLD AUTO: 4.14 10E6/UL (ref 3.8–5.2)
SODIUM SERPL-SCNC: 140 MMOL/L (ref 136–145)
SODIUM SERPL-SCNC: 141 MMOL/L (ref 135–145)
SYSTOLIC BLOOD PRESSURE - MUSE: 109 MMHG
SYSTOLIC BLOOD PRESSURE - MUSE: NORMAL MMHG
T AXIS - MUSE: 57 DEGREES
T AXIS - MUSE: 63 DEGREES
TROPONIN I SERPL-MCNC: 0.02 NG/ML (ref 0–0.29)
TSH SERPL DL<=0.005 MIU/L-ACNC: 1.6 UIU/ML (ref 0.3–5)
VENTRICULAR RATE- MUSE: 129 BPM
VENTRICULAR RATE- MUSE: 62 BPM
WBC # BLD AUTO: 5.6 10E3/UL (ref 4–11)
WBC # BLD AUTO: 6.7 10E3/UL (ref 4–11)

## 2023-01-01 PROCEDURE — 36415 COLL VENOUS BLD VENIPUNCTURE: CPT | Performed by: INTERNAL MEDICINE

## 2023-01-01 PROCEDURE — 99214 OFFICE O/P EST MOD 30 MIN: CPT | Mod: 25 | Performed by: INTERNAL MEDICINE

## 2023-01-01 PROCEDURE — 99204 OFFICE O/P NEW MOD 45 MIN: CPT | Performed by: INTERNAL MEDICINE

## 2023-01-01 PROCEDURE — 93005 ELECTROCARDIOGRAM TRACING: CPT | Performed by: EMERGENCY MEDICINE

## 2023-01-01 PROCEDURE — 85027 COMPLETE CBC AUTOMATED: CPT | Performed by: INTERNAL MEDICINE

## 2023-01-01 PROCEDURE — 99213 OFFICE O/P EST LOW 20 MIN: CPT | Performed by: INTERNAL MEDICINE

## 2023-01-01 PROCEDURE — 258N000003 HC RX IP 258 OP 636: Performed by: EMERGENCY MEDICINE

## 2023-01-01 PROCEDURE — 99214 OFFICE O/P EST MOD 30 MIN: CPT | Performed by: INTERNAL MEDICINE

## 2023-01-01 PROCEDURE — 80053 COMPREHEN METABOLIC PANEL: CPT | Performed by: EMERGENCY MEDICINE

## 2023-01-01 PROCEDURE — 90662 IIV NO PRSV INCREASED AG IM: CPT | Performed by: INTERNAL MEDICINE

## 2023-01-01 PROCEDURE — 82248 BILIRUBIN DIRECT: CPT | Performed by: EMERGENCY MEDICINE

## 2023-01-01 PROCEDURE — 36415 COLL VENOUS BLD VENIPUNCTURE: CPT | Performed by: EMERGENCY MEDICINE

## 2023-01-01 PROCEDURE — 99284 EMERGENCY DEPT VISIT MOD MDM: CPT | Mod: 25

## 2023-01-01 PROCEDURE — G0008 ADMIN INFLUENZA VIRUS VAC: HCPCS | Performed by: INTERNAL MEDICINE

## 2023-01-01 PROCEDURE — 83735 ASSAY OF MAGNESIUM: CPT | Performed by: EMERGENCY MEDICINE

## 2023-01-01 PROCEDURE — 96360 HYDRATION IV INFUSION INIT: CPT

## 2023-01-01 PROCEDURE — 93306 TTE W/DOPPLER COMPLETE: CPT | Mod: 26 | Performed by: INTERNAL MEDICINE

## 2023-01-01 PROCEDURE — 93306 TTE W/DOPPLER COMPLETE: CPT

## 2023-01-01 PROCEDURE — 84443 ASSAY THYROID STIM HORMONE: CPT | Performed by: EMERGENCY MEDICINE

## 2023-01-01 PROCEDURE — 91320 SARSCV2 VAC 30MCG TRS-SUC IM: CPT | Performed by: INTERNAL MEDICINE

## 2023-01-01 PROCEDURE — 80053 COMPREHEN METABOLIC PANEL: CPT | Performed by: INTERNAL MEDICINE

## 2023-01-01 PROCEDURE — 90480 ADMN SARSCOV2 VAC 1/ONLY CMP: CPT | Performed by: INTERNAL MEDICINE

## 2023-01-01 PROCEDURE — 84484 ASSAY OF TROPONIN QUANT: CPT | Performed by: EMERGENCY MEDICINE

## 2023-01-01 PROCEDURE — 99306 1ST NF CARE HIGH MDM 50: CPT | Performed by: FAMILY MEDICINE

## 2023-01-01 PROCEDURE — 83735 ASSAY OF MAGNESIUM: CPT | Performed by: INTERNAL MEDICINE

## 2023-01-01 PROCEDURE — 85025 COMPLETE CBC W/AUTO DIFF WBC: CPT | Performed by: EMERGENCY MEDICINE

## 2023-01-01 PROCEDURE — G0439 PPPS, SUBSEQ VISIT: HCPCS | Performed by: INTERNAL MEDICINE

## 2023-01-01 RX ORDER — METOPROLOL SUCCINATE 25 MG/1
12.5 TABLET, EXTENDED RELEASE ORAL DAILY
COMMUNITY

## 2023-01-01 RX ORDER — LOSARTAN POTASSIUM 50 MG/1
50 TABLET ORAL DAILY
Qty: 90 TABLET | Refills: 3 | Status: SHIPPED | OUTPATIENT
Start: 2023-01-01

## 2023-01-01 RX ORDER — DILTIAZEM HYDROCHLORIDE 240 MG/1
240 CAPSULE, COATED, EXTENDED RELEASE ORAL DAILY
Qty: 60 CAPSULE | Refills: 3 | Status: SHIPPED | OUTPATIENT
Start: 2023-01-01 | End: 2023-01-01 | Stop reason: SINTOL

## 2023-01-01 RX ORDER — DILTIAZEM HYDROCHLORIDE EXTENDED-RELEASE TABLETS 180 MG/1
180 TABLET, EXTENDED RELEASE ORAL DAILY
Qty: 15 TABLET | Refills: 0 | Status: SHIPPED | OUTPATIENT
Start: 2023-01-01 | End: 2023-01-01

## 2023-01-01 RX ORDER — METOPROLOL SUCCINATE 50 MG/1
50 TABLET, EXTENDED RELEASE ORAL DAILY
Qty: 30 TABLET | Refills: 11 | Status: SHIPPED | OUTPATIENT
Start: 2023-01-01 | End: 2023-01-01

## 2023-01-01 RX ORDER — ATORVASTATIN CALCIUM 10 MG/1
10 TABLET, FILM COATED ORAL AT BEDTIME
Qty: 60 TABLET | Refills: 0 | Status: SHIPPED | OUTPATIENT
Start: 2023-01-01

## 2023-01-01 RX ORDER — DILTIAZEM HYDROCHLORIDE EXTENDED-RELEASE TABLETS 180 MG/1
180 TABLET, EXTENDED RELEASE ORAL DAILY
Qty: 30 TABLET | Refills: 3 | Status: SHIPPED | OUTPATIENT
Start: 2023-01-01 | End: 2023-01-01

## 2023-01-01 RX ORDER — LEVETIRACETAM 500 MG/1
500 TABLET ORAL 2 TIMES DAILY
COMMUNITY
Start: 2023-01-01 | End: 2024-12-23

## 2023-01-01 RX ORDER — ACETAMINOPHEN 325 MG/1
650 TABLET ORAL EVERY 4 HOURS PRN
COMMUNITY
Start: 2023-01-01

## 2023-01-01 RX ORDER — CLOPIDOGREL BISULFATE 75 MG/1
75 TABLET ORAL DAILY
COMMUNITY
Start: 2023-01-01 | End: 2024-01-01

## 2023-01-01 RX ADMIN — SODIUM CHLORIDE 500 ML: 9 INJECTION, SOLUTION INTRAVENOUS at 13:37

## 2023-01-01 RX ADMIN — SODIUM CHLORIDE 500 ML: 9 INJECTION, SOLUTION INTRAVENOUS at 14:43

## 2023-01-01 ASSESSMENT — ENCOUNTER SYMPTOMS
CONSTIPATION: 0
JOINT SWELLING: 0
CONSTIPATION: 0
HEADACHES: 0
EYE PAIN: 0
DYSURIA: 0
MYALGIAS: 0
CHILLS: 0
JOINT SWELLING: 0
BREAST MASS: 0
SORE THROAT: 0
DIARRHEA: 0
FREQUENCY: 0
ABDOMINAL PAIN: 0
SHORTNESS OF BREATH: 0
SORE THROAT: 0
EYE PAIN: 0
WEAKNESS: 0
ARTHRALGIAS: 1
CHILLS: 0
SHORTNESS OF BREATH: 0
FREQUENCY: 0
ABDOMINAL PAIN: 0
HEMATOCHEZIA: 0
PARESTHESIAS: 0
NAUSEA: 0
FEVER: 0
HEARTBURN: 0
DIARRHEA: 0
HEARTBURN: 0
FEVER: 0
BREAST MASS: 0
ARTHRALGIAS: 1
DYSURIA: 0
HEMATURIA: 0
NAUSEA: 0
PARESTHESIAS: 0
HEMATOCHEZIA: 0
DIZZINESS: 0
MYALGIAS: 0
PALPITATIONS: 0
PALPITATIONS: 0
WEAKNESS: 0
DIZZINESS: 0
HEADACHES: 0
HEMATURIA: 0

## 2023-01-01 ASSESSMENT — ACTIVITIES OF DAILY LIVING (ADL)
ADLS_ACUITY_SCORE: 35
CURRENT_FUNCTION: NO ASSISTANCE NEEDED
CURRENT_FUNCTION: NO ASSISTANCE NEEDED

## 2023-05-09 NOTE — PROGRESS NOTES
Office Visit - Follow Up   Kim Valdes   88 year old female    Date of Visit: 5/9/2023    Chief Complaint   Patient presents with     Hypertension        -------------------------------------------------------------------------------------------------------------------------  Assessment and Plan    Follow-up multiple issues.  Kim comes to today's visit accompanied by her  Lon.  They have made a big decision this year to sell their home in [place] and move into senior [facility].  They will be in a 2 bedroom apartment, which includes a solarium.  Kim still drives.  They are actually going to move in this coming Friday, November 11.     She and  Lon moved in the [facility] senior community in [place] December 29, 2022, and they are closing on the sale of their house in [place] on May 1  9, 2023.    Weight loss, probably because of the stress of selling the house they lived in for 20 years, and moving into [facility].    Ms. Valdes told me that it was very stressful to move all their belongings out of the house, conduct the sale, and move into [facility].  She lost about 10 pounds  But now she told me things are settling down  She does the cooking in their new apartment, and they take 1 meal per week offered by [facility]  I told her to not lose anymore weight, I like to see her add back maybe 10 pounds.    [facility] offers exercise classes, but she is hesitant to go to them because she wants to stay close to Lon, who needs frequent monitoring.    Wt Readings from Last 5 Encounters:   05/09/23 53.5 kg (118 lb)   11/08/22 57.2 kg (126 lb 1.6 oz)   04/27/22 57.2 kg (126 lb)   10/26/21 58.7 kg (129 lb 4.8 oz)   04/23/21 59.4 kg (131 lb)     Supraventricular tachycardia   Supraventricular tachycardia on Cartia XT for the last 2 years.  She only gets occasional brief fluttering episodes, nothing sustained.    Cardiac event monitoring from  11/28/2022 to 12/27/2022 (monitored duration 27d 5h 23m).  Baseline rhythm was sinus rhythm with PACs 65bpm.    Reported heart rate range 50 to 164bpm, average 67bpm.  7 symptom triggers (palpitations) correlated to sinus rhythm with first degree AV block, PACs, nonsustained supraventricular tachycardia.  Automated recordings included episodes of apparently sustained regular supraventricular tachycardia (144-164bpm, at least 4 episodes, 12/6/2022, two on 12/12/2022, 12/16/2022), nonsustained supraventricular tachycardia, 1st degree AV block, PACs, PVCs.  No atrial fibrillation.  There were no pauses noted.  SVT episodes (duration, onset, offset) are not well characterized on this monitoring modality.  Supraventricular and ventricular ectopic beat frequency are not reported on this monitoring modality.      Systolic ejection murmur best heard right upper sternal border, which is probably aortic sclerosis, doubt stenosis    Left shoulder pain-- doing better-- after a fall December 2021  Slipped on black ice December 26, 2021, sustaining a contusion of her left upper arm, possibly injuring her shoulder.  As of November 8, 2022, she is recovered good range of motion of the left shoulder, able to raise her arms all the way up over her head.     Advanced lumbar spondylosis and central canal stenosis with Neurogenic claudication and right lower extremity radiculopathic symptoms with subtle findings of distal muscle weakness  [facility] 10/5/2021, received a right sided epidural steroid injection directed at the right L5 nerve root, additional diagnosis of L4-5 spondylolisthesis with severe central canal stenosis and neurogenic claudication, also severe L4-S1 facet arthritis with inflammation of the right L4 and L5 pedicles     Keep using Swiss rubber ball, which might help her do core muscle strengthening exercises for her lower back and abdominals.  EXAM: MR LUMBAR SPINE WO CONTRAST  LOCATION: [facility]  DATE/TIME: 2/11/2020 2:24 PM     IMPRESSION:   1.  Multilevel degenerative disc disease of the lumbar spine with symmetric disc bulges, most pronounced at the L4/L5 level where there is severe spinal canal and bilateral lateral recess narrowing (AP diameter the spinal canal measuring 3 mm).  2.  Advanced bilateral facet arthropathy and bilateral facet joint effusions at L4/L5. Flexion and extension views could be performed to evaluate for dynamic instability.  3.  Multilevel posterolateral disc disease and facet arthropathy with multilevel neural foraminal narrowing, most pronounced at the L4/L5 level where there is moderate to severe right and moderate left neural foraminal narrowing.     Continue with a conservative management strategy of exercise and maximizing mobility.     Cervical spondylosis, with central canal stenosis at C6-C7 level, with mild compression of the cervical spinal cord, but no evidence of myelomalacia by MRI March 5, 2020  EXAM: MR CERVICAL SPINE WO CONTRAST  LOCATION: [facility]  DATE/TIME: 3/17/2020 1:46 PM  1.  Multilevel degenerative disc disease of the cervical spine with disc osteophyte complexes, most pronounced at the C5/C6 level where there is moderate to severe spinal canal narrowing and compression of the cervical spinal cord and at the C6/C7 level where there is moderate spinal canal narrowing and mild compression of the cervical spinal cord. No definite evidence of edema or myelomalacia at these levels.  2.  Multilevel uncovertebral joint disease and facet arthropathy with severe multilevel neural foraminal narrowing at C3/C4-C6/C7 as described.     Hypertension with elevated systolic component    BP Readings from Last 6 Encounters:   05/09/23 110/72   11/08/22 138/70   04/27/22 134/68   10/26/21 138/68   04/23/21 120/80   09/01/20 (!) 144/70     She takes full tablet of the losartan 50/hydrochlorothiazide 12.5.     Current medications are losartan  hydrochlorothiazide as a fixed dose combination pill and also she takes Cartia XT for supraventricular tachycardia suppression, but that also has blood pressure lowering effects.    Touch of prerenal azotemia with slightly elevated creatinine and urea nitrogen on basic metabolic panel November 8, 2022, probably was a bit dehydrated at the time the blood was drawn   Latest Reference Range & Units 11/08/22 11:40   Urea Nitrogen 8.0 - 23.0 mg/dL 28.8 (H)   Creatinine 0.51 - 0.95 mg/dL 1.19 (H)   GFR Estimate >60 mL/min/1.73m2 44 (L)   (H): Data is abnormally high  (L): Data is abnormally low     History of benign monoclonal gammopathy.    Serum protein electrophoresis from specimen drawn October 23, 2019 showed no monoclonal protein.  I think we can stop checking.     Hyperlipidemia on treatment  Continue on atorvastatin 10 milligrams a day     Latest Reference Range & Units 11/08/22 11:40   Cholesterol <200 mg/dL 161   Glucose 70 - 99 mg/dL 108 (H)   HDL Cholesterol >=50 mg/dL 74   LDL Cholesterol Calculated <=100 mg/dL 69   Non HDL Cholesterol <130 mg/dL 87   Triglycerides <150 mg/dL 89     Menopause with history of previous left oophrectomy.  Uterus is still in place.   Latest Reference Range & Units 11/08/22 11:40   TSH 0.30 - 4.20 uIU/mL 2.07     Probably has at least osteopenia, probably osteoporosis, and I would like her to continue taking calcium and vitamin D supplement.  The amount of calcium is 1000 mg of elemental calcium per day.  The form of calcium can be calcium carbonate (for example Tums) or calcium citrate (Citracal or Caltrate)  The amount of the element calcium is reported on the label of these products.  Also over-the-counter vitamin D 2000 units a day.     History of adenomatous colon polyp.  Last colonoscopy was October 2015, and she has stopped colon screening.  The October 2015 colonoscopy showed diverticulosis, no polyps.  She can stop colon screening at this point.     Vulvar lichen  sclerosus, for which she uses topical steroid ointment with good effect.     She has received Prevnar 13 and pneumococcal 23 polysaccharide already.      --------------------------------------------------------------------------------------------------------------------------  History of Present Illness  This 88 year old old     Follow-up multiple issues.  Kim comes to today's visit accompanied by her  Lon.  They have made a big decision this year to sell their home in [place] and move into senior living at [facility].  They will be in a 2 bedroom apartment, which includes a solarium.  Kim still drives.  They are actually going to move in this coming Friday, November 11.     She and  Lon moved in the [facility] senior community in [place] December 29, 2022, and they are closing on the sale of their house in [place] on May 1  9, 2023.    Weight loss, probably because of the stress of selling the house they lived in for 20 years, and moving into [facility].    Ms. Valdes told me that it was very stressful to move all their belongings out of the house, conduct the sale, and move into [facility].  She lost about 10 pounds  But now she told me things are settling down  She does the cooking in their new apartment, and they take 1 meal per week offered by [facility]  I told her to not lose anymore weight, I like to see her add back maybe 10 pounds.    [facility] offers exercise classes, but she is hesitant to go to them because she wants to stay close to Lon, who needs frequent monitoring.    Wt Readings from Last 5 Encounters:   05/09/23 53.5 kg (118 lb)   11/08/22 57.2 kg (126 lb 1.6 oz)   04/27/22 57.2 kg (126 lb)   10/26/21 58.7 kg (129 lb 4.8 oz)   04/23/21 59.4 kg (131 lb)     ---------------------------------------------------------------------------------------------------------------------------    Medications, Allergies, Social, and Problem  "List       Current Outpatient Medications   Medication Sig Dispense Refill     aspirin 81 MG EC tablet [ASPIRIN 81 MG EC TABLET] Take 81 mg by mouth every evening.       atorvastatin (LIPITOR) 10 MG tablet Take 1 tablet (10 mg) by mouth At Bedtime 90 tablet 3     diltiazem ER COATED BEADS (CARDIZEM CD/CARTIA XT) 120 MG 24 hr capsule Take 1 capsule (120 mg) by mouth daily 90 capsule 3     losartan-hydrochlorothiazide (HYZAAR) 50-12.5 MG tablet Take 1 tablet by mouth once daily 90 tablet 3     mometasone (ELOCON) 0.1 % ointment [MOMETASONE (ELOCON) 0.1 % OINTMENT] Apply thin layer QD to affected area when symptoms present, once a week for maintenance       Allergies   Allergen Reactions     Omeprazole Unknown     Social History     Tobacco Use     Smoking status: Never     Smokeless tobacco: Never     Patient Active Problem List   Diagnosis     Symptomatic Menopause     Hypercholesteremia     Adenomatous polyp     SVT (supraventricular tachycardia) (H)     Essential hypertension     Renal insufficiency     Spinal stenosis of lumbar region with neurogenic claudication     Cervical stenosis of spinal canal        Reviewed, reconciled and updated       Physical Exam   General Appearance:       /72 (BP Location: Right arm, Patient Position: Sitting, Cuff Size: Adult Regular)   Pulse 79   Temp 98.5  F (36.9  C)   Resp 16   Ht 1.664 m (5' 5.5\")   Wt 53.5 kg (118 lb)   SpO2 96%   BMI 19.34 kg/m      Ms. Valdes looks good today, although she did lose weight since our last meeting.  Blood pressure is nicely controlled  Lungs clear  Heart with underlying regular rhythm but frequent premature beats which are probably APCs which we have previously documented  2/6 systolic ejection murmur right upper sternal border there is probably aortic sclerosis  Abdomen nontender  Extremities no edema     Additional Information   I spent 20 minutes on this encounter, including reviewing interval history since last visit, " examining the patient, explaining and counseling the issues enumerated in the Assessment and Plan (patient given a copy)     GANGA FRANCIS MD, MD

## 2023-05-09 NOTE — PATIENT INSTRUCTIONS
Follow-up multiple issues.  Kim comes to today's visit accompanied by her  Lon.  They have made a big decision this year to sell their home in [place] and move into senior living at [facility].  They will be in a 2 bedroom apartment, which includes a solarium.  Kim still drives.  They are actually going to move in this coming Friday, November 11.     She and  Lon moved in the [facility] senior community in [place] December 29, 2022, and they are closing on the sale of their house in [place] on May 1  9, 2023.    Weight loss, probably because of the stress of selling the house they lived in for 20 years, and moving into [facility].    Ms. Valdes told me that it was very stressful to move all their belongings out of the house, conduct the sale, and move into [facility].  She lost about 10 pounds  But now she told me things are settling down  She does the cooking in their new apartment, and they take 1 meal per week offered by [facility]  I told her to not lose anymore weight, I like to see her add back maybe 10 pounds.    [facility] offers exercise classes, but she is hesitant to go to them because she wants to stay close to Lon, who needs frequent monitoring.    Wt Readings from Last 5 Encounters:   05/09/23 53.5 kg (118 lb)   11/08/22 57.2 kg (126 lb 1.6 oz)   04/27/22 57.2 kg (126 lb)   10/26/21 58.7 kg (129 lb 4.8 oz)   04/23/21 59.4 kg (131 lb)     Supraventricular tachycardia   Supraventricular tachycardia on Cartia XT for the last 2 years.  She only gets occasional brief fluttering episodes, nothing sustained.    Cardiac event monitoring from 11/28/2022 to 12/27/2022 (monitored duration 27d 5h 23m).  Baseline rhythm was sinus rhythm with PACs 65bpm.    Reported heart rate range 50 to 164bpm, average 67bpm.  7 symptom triggers (palpitations) correlated to sinus rhythm with first degree AV block, PACs, nonsustained supraventricular  tachycardia.  Automated recordings included episodes of apparently sustained regular supraventricular tachycardia (144-164bpm, at least 4 episodes, 12/6/2022, two on 12/12/2022, 12/16/2022), nonsustained supraventricular tachycardia, 1st degree AV block, PACs, PVCs.  No atrial fibrillation.  There were no pauses noted.  SVT episodes (duration, onset, offset) are not well characterized on this monitoring modality.  Supraventricular and ventricular ectopic beat frequency are not reported on this monitoring modality.      Systolic ejection murmur best heard right upper sternal border, which is probably aortic sclerosis, doubt stenosis    Left shoulder pain-- doing better-- after a fall December 2021  Slipped on black ice December 26, 2021, sustaining a contusion of her left upper arm, possibly injuring her shoulder.  As of November 8, 2022, she is recovered good range of motion of the left shoulder, able to raise her arms all the way up over her head.     Advanced lumbar spondylosis and central canal stenosis with Neurogenic claudication and right lower extremity radiculopathic symptoms with subtle findings of distal muscle weakness  [facility] 10/5/2021, received a right sided epidural steroid injection directed at the right L5 nerve root, additional diagnosis of L4-5 spondylolisthesis with severe central canal stenosis and neurogenic claudication, also severe L4-S1 facet arthritis with inflammation of the right L4 and L5 pedicles     Keep using Swiss rubber ball, which might help her do core muscle strengthening exercises for her lower back and abdominals.  EXAM: MR LUMBAR SPINE WO CONTRAST  LOCATION: [facility]  DATE/TIME: 2/11/2020 2:24 PM     IMPRESSION:   1.  Multilevel degenerative disc disease of the lumbar spine with symmetric disc bulges, most pronounced at the L4/L5 level where there is severe spinal canal and bilateral lateral recess narrowing (AP diameter the spinal canal measuring 3  mm).  2.  Advanced bilateral facet arthropathy and bilateral facet joint effusions at L4/L5. Flexion and extension views could be performed to evaluate for dynamic instability.  3.  Multilevel posterolateral disc disease and facet arthropathy with multilevel neural foraminal narrowing, most pronounced at the L4/L5 level where there is moderate to severe right and moderate left neural foraminal narrowing.     Continue with a conservative management strategy of exercise and maximizing mobility.     Cervical spondylosis, with central canal stenosis at C6-C7 level, with mild compression of the cervical spinal cord, but no evidence of myelomalacia by MRI March 5, 2020  EXAM: MR CERVICAL SPINE WO CONTRAST  LOCATION: [facility]  DATE/TIME: 3/17/2020 1:46 PM  1.  Multilevel degenerative disc disease of the cervical spine with disc osteophyte complexes, most pronounced at the C5/C6 level where there is moderate to severe spinal canal narrowing and compression of the cervical spinal cord and at the C6/C7 level where there is moderate spinal canal narrowing and mild compression of the cervical spinal cord. No definite evidence of edema or myelomalacia at these levels.  2.  Multilevel uncovertebral joint disease and facet arthropathy with severe multilevel neural foraminal narrowing at C3/C4-C6/C7 as described.     Hypertension with elevated systolic component    BP Readings from Last 6 Encounters:   05/09/23 110/72   11/08/22 138/70   04/27/22 134/68   10/26/21 138/68   04/23/21 120/80   09/01/20 (!) 144/70     She takes full tablet of the losartan 50/hydrochlorothiazide 12.5.     Current medications are losartan hydrochlorothiazide as a fixed dose combination pill and also she takes Cartia XT for supraventricular tachycardia suppression, but that also has blood pressure lowering effects.    Touch of prerenal azotemia with slightly elevated creatinine and urea nitrogen on basic metabolic panel November 8, 2022,  probably was a bit dehydrated at the time the blood was drawn   Latest Reference Range & Units 11/08/22 11:40   Urea Nitrogen 8.0 - 23.0 mg/dL 28.8 (H)   Creatinine 0.51 - 0.95 mg/dL 1.19 (H)   GFR Estimate >60 mL/min/1.73m2 44 (L)   (H): Data is abnormally high  (L): Data is abnormally low     History of benign monoclonal gammopathy.    Serum protein electrophoresis from specimen drawn October 23, 2019 showed no monoclonal protein.  I think we can stop checking.     Hyperlipidemia on treatment  Continue on atorvastatin 10 milligrams a day     Latest Reference Range & Units 11/08/22 11:40   Cholesterol <200 mg/dL 161   Glucose 70 - 99 mg/dL 108 (H)   HDL Cholesterol >=50 mg/dL 74   LDL Cholesterol Calculated <=100 mg/dL 69   Non HDL Cholesterol <130 mg/dL 87   Triglycerides <150 mg/dL 89     Menopause with history of previous left oophrectomy.  Uterus is still in place.   Latest Reference Range & Units 11/08/22 11:40   TSH 0.30 - 4.20 uIU/mL 2.07     Probably has at least osteopenia, probably osteoporosis, and I would like her to continue taking calcium and vitamin D supplement.  The amount of calcium is 1000 mg of elemental calcium per day.  The form of calcium can be calcium carbonate (for example Tums) or calcium citrate (Citracal or Caltrate)  The amount of the element calcium is reported on the label of these products.  Also over-the-counter vitamin D 2000 units a day.     History of adenomatous colon polyp.  Last colonoscopy was October 2015, and she has stopped colon screening.  The October 2015 colonoscopy showed diverticulosis, no polyps.  She can stop colon screening at this point.     Vulvar lichen sclerosus, for which she uses topical steroid ointment with good effect.     She has received Prevnar 13 and pneumococcal 23 polysaccharide already.

## 2023-08-21 NOTE — ED TRIAGE NOTES
Patient presents to ED with complaints of tachycardia since 0900. Denies pain or SOB but does report hypotension at home. Normotensive in triage.    Triage Assessment       Row Name 08/21/23 1307       Triage Assessment (Adult)    Airway WDL WDL       Skin Circulation/Temperature WDL    Skin Circulation/Temperature WDL WDL       Cardiac WDL    Cardiac WDL X  tachycardia

## 2023-08-21 NOTE — ED PROVIDER NOTES
EMERGENCY DEPARTMENT ENCOUNTER      NAME: Kim Valdes  AGE: 88 year old female  YOB: 1935  MRN: [id number]  EVALUATION DATE & TIME: 8/21/2023  1:14 PM    PCP: Bryan Way    ED PROVIDER: Gonzalo Dukes M.D.      Chief Complaint   Patient presents with    Tachycardia         FINAL IMPRESSION:  1. Tachycardia          ED COURSE & MEDICAL DECISION MAKING:    Pertinent Labs & Imaging studies reviewed. (See chart for details)  88 year old female presents to the Emergency Department for evaluation of palpitations. Patient appears non toxic with stable vitals signs, patient is tachycardic but afebrile with no hypoxia or increased work of breathing.  Lungs are clear, patient is tachycardic but otherwise afebrile, she denies chest pain, pleurisy, no reports of any ripping or tearing chest discomfort the back or shoulders.  Review the medical record shows progress note with primary care on 5/9/2023, per this note from Dr. Way patient has history of supraventricular tachycardia currently on diltiazem for the last 2 years, did have cardiac event monitoring from November 28, 2022 to December 27, 2022.  Concern for SVT, dysrhythmia, electrode abnormality, considered but with no chest pain, shortness of breath, clear lungs, certainly nothing to suggest ACS, PE, dissection, pneumonia, CHF.  We did obtain screening labs here, did note creatinine of 1.52 and the patient was given IV fluids but no electrolyte abnormalities, troponin negative and again ECG nonischemic with certainly nothing just cardiac ischemia.  Considered chest x-ray but again lungs are clear and the patient denies any other chest or pulmonary complaints and no indication for emergent imaging at this time.  While awaiting cardiology consultation patient spontaneously converted to normal sinus rhythm.  Did discuss patient case and findings with cardiology.  At this time cardiology recommended slight increase in the patient's diltiazem to  180 mg daily and then close follow-up with primary care and cardiology as indicated.  Patient states that she does have follow-up with her primary care provider tomorrow and I felt it was reasonable to have the patient first follow-up with primary care tomorrow, discussed findings of medication changes here today and then continued outpatient management by primary care or cardiology as indicated.  My repeat exam patient is in normal sinus rhythm, denies symptoms and exam is benign.  For all these reasons feel she is safe for discharge and close follow-up.  Discussed all these findings recommendations with the patient felt reassured, will discharge.  Return precautions provided.    2:05 PM I introduced myself to the patient, obtained patient history, performed a physical exam, and discussed plan for ED workup including potential diagnostic laboratory/imaging studies and interventions.   2:33 PM patient converted on her own.   3:06 PM I spoke on the phone with Dr. Beach about the patient and recommendations.   3:05 PM repeat exam is benign. We discussed the plan for discharge and the patient is agreeable. Reviewed supportive cares, symptomatic treatment, outpatient follow up, and reasons to return to the Emergency Department. Patient to be discharged by ED RN.          Medical Decision Making    History:  Supplemental history from: Documented in chart, if applicable  External Record(s) reviewed: Documented in chart, if applicable.    Work Up:  Chart documentation includes differential considered and any EKGs or imaging independently interpreted by provider, where specified.  In additional to work up documented, I considered the following work up: Documented in chart, if applicable.    External consultation:  Discussion of management with another provider: Documented in chart, if applicable    Complicating factors:  Care impacted by chronic illness: Hypertension and Other: Hypercholesteremia    Care affected by  social determinants of health: N/A    Disposition considerations: Discharge.  I recommended patient dose adjust her diltiazem from 120 to 180 mg daily.  See documentation for further details.        At the conclusion of the encounter I discussed the results of all of the tests and the disposition. The questions were answered and return precautions provided. The patient or family acknowledged understanding and was agreeable with the care plan.         MEDICATIONS GIVEN IN THE EMERGENCY:  Medications   0.9% sodium chloride BOLUS (0 mLs Intravenous Stopped 8/21/23 1442)   0.9% sodium chloride BOLUS (0 mLs Intravenous Stopped 8/21/23 1505)       NEW PRESCRIPTIONS STARTED AT TODAY'S ER VISIT  Discharge Medication List as of 8/21/2023  3:06 PM        START taking these medications    Details   diltiazem ER (CARDIAZEM LA) 180 MG 24 hr tablet Take 1 tablet (180 mg) by mouth daily for 15 days, Disp-15 tablet, R-0, Local Print                  =================================================================    HPI    Patient information was obtained from: Patient    Use of Intrepreter: N/A     Kim Valdes is a 88 year old female who presents to the ED via walk in with a history of HTN, Hypercholesteremia and SVT for evaluation of tachycardia     Per chart review, the patient was seen at [facility] on 9/14/23 presenting for evaluation of heart palpitations. 3 days ago she developed sustained heart racing. She came to the emergency room. She was found to be in narrow QRS complex tachycardia which was irregular. She failed vagal maneuvers. She was given single dose of adenosine which converted her back to sinus rhythm. EKG after conversion showed normal sinus rhythm without significant abnormalities. The patient was prescribed diltiazem. The patient was sent home.    The patient reports that she has been having episodes of fast heart rate that last about 10-15 minutes for a couple of years now. The episodes have  become more frequent than what they were in the past. Today she had an episode that started at 0930 this morning and has been constant since. She states that her symptoms are similar to what she has experienced in the past. Denies chest pain, fever, shortness of breath, syncope, and problems urinating.      She also note that she has seen a cardiologist for this in the past and has been on diltiazem since.       REVIEW OF SYSTEMS   Constitutional:  Denies fever, chills  Respiratory:  Denies productive cough or increased work of breathing  Cardiovascular: Endorses palpitations. Denies chest pain  GI:  Denies abdominal pain, nausea, vomiting, or change in bowel or bladder habits   Musculoskeletal:  Denies any new muscle/joint swelling  Skin:  Denies rash   Neurologic:  Denies focal weakness  All systems negative except as marked.     PAST MEDICAL HISTORY:  Past Medical History:   Diagnosis Date    Cholecystitis, unspecified     Created by Conversion        PAST SURGICAL HISTORY:  Past Surgical History:   Procedure Laterality Date    APPENDECTOMY  age 13    LAPAROSCOPIC CHOLECYSTECTOMY  mid 60's    OOPHORECTOMY Left age 61         CURRENT MEDICATIONS:    Prior to Admission medications    Medication Sig Start Date End Date Taking? Authorizing Provider   aspirin 81 MG EC tablet [ASPIRIN 81 MG EC TABLET] Take 81 mg by mouth every evening. 9/10/15   Provider, Historical   atorvastatin (LIPITOR) 10 MG tablet Take 1 tablet (10 mg) by mouth At Bedtime 11/3/22   Bryan Way MD   diltiazem ER COATED BEADS (CARDIZEM CD/CARTIA XT) 120 MG 24 hr capsule Take 1 capsule (120 mg) by mouth daily 10/6/22   Bryan Way MD   losartan-hydrochlorothiazide (HYZAAR) 50-12.5 MG tablet Take 1 tablet by mouth once daily 9/16/22   Bryan Way MD   mometasone (ELOCON) 0.1 % ointment [MOMETASONE (ELOCON) 0.1 % OINTMENT] Apply thin layer QD to affected area when symptoms present, once a week for maintenance 7/16/19    "Provider, Historical        ALLERGIES:  Allergies   Allergen Reactions    Omeprazole Unknown       FAMILY HISTORY:  Family History   Problem Relation Age of Onset    Coronary Artery Disease Father     Cerebrovascular Disease Mother        SOCIAL HISTORY:   Social History     Socioeconomic History    Marital status:    Tobacco Use    Smoking status: Never    Smokeless tobacco: Never       VITALS:  Patient Vitals for the past 24 hrs:   BP Temp Temp src Pulse Resp SpO2 Height Weight   08/21/23 1500 124/58 -- -- 52 -- 99 % -- --   08/21/23 1430 129/82 -- -- 60 -- 94 % -- --   08/21/23 1415 109/65 -- -- (!) 121 -- 99 % -- --   08/21/23 1400 96/55 -- -- (!) 121 -- 98 % -- --   08/21/23 1345 98/60 -- -- (!) 125 -- 93 % -- --   08/21/23 1331 99/83 -- -- (!) 128 -- 96 % -- --   08/21/23 1322 105/61 -- -- (!) 128 -- -- -- --   08/21/23 1306 123/61 98.2  F (36.8  C) Temporal (!) 134 20 96 % 1.676 m (5' 6\") 54 kg (119 lb)        PHYSICAL EXAM    Constitutional:  Awake, alert, in no apparent distress  HENT:  Normocephalic, Atraumatic. Bilateral external ears normal. Oropharynx moist. Nose normal. Neck- Normal range of motion with no guarding, No midline cervical tenderness, Supple, No stridor.   Eyes:  PERRL, EOMI with no signs of entrapment, Conjunctiva normal, No discharge.   Respiratory:  Normal breath sounds, No respiratory distress, No wheezing.    Cardiovascular:  tachycardia, Normal rhythm, No appreciable rubs or gallops.   GI:  Soft, No tenderness, No distension, No palpable masses  Musculoskeletal:  Intact distal pulses, No edema. Good range of motion in all major joints. No tenderness to palpation or major deformities noted.  Integument:  Warm, Dry, No erythema, No rash.   Neurologic:  Alert & oriented, Normal motor function, Normal sensory function, No focal deficits noted.   Psychiatric:  Affect normal, Judgment normal, Mood normal.     LAB:  All pertinent labs reviewed and interpreted.  Results for orders " placed or performed during the hospital encounter of 08/21/23   Extra Blue Top Tube   Result Value Ref Range    Hold Specimen JIC    Extra Red Top Tube   Result Value Ref Range    Hold Specimen JIC    Extra Green Top (Lithium Heparin) Tube   Result Value Ref Range    Hold Specimen JIC    Extra Purple Top Tube   Result Value Ref Range    Hold Specimen     Basic metabolic panel   Result Value Ref Range    Sodium 140 136 - 145 mmol/L    Potassium 4.7 3.5 - 5.0 mmol/L    Chloride 104 98 - 107 mmol/L    Carbon Dioxide (CO2) 26 22 - 31 mmol/L    Anion Gap 10 5 - 18 mmol/L    Urea Nitrogen 32 (H) 8 - 28 mg/dL    Creatinine 1.52 (H) 0.60 - 1.10 mg/dL    Calcium 9.5 8.5 - 10.5 mg/dL    Glucose 144 (H) 70 - 125 mg/dL    GFR Estimate 33 (L) >60 mL/min/1.73m2   Result Value Ref Range    Troponin I 0.02 0.00 - 0.29 ng/mL   Result Value Ref Range    Magnesium 2.0 1.8 - 2.6 mg/dL   Hepatic function panel   Result Value Ref Range    Bilirubin Total 0.6 0.0 - 1.0 mg/dL    Bilirubin Direct 0.2 <=0.5 mg/dL    Protein Total 7.4 6.0 - 8.0 g/dL    Albumin 3.8 3.5 - 5.0 g/dL    Alkaline Phosphatase 74 45 - 120 U/L    AST 29 0 - 40 U/L    ALT 19 0 - 45 U/L   TSH with free T4 reflex   Result Value Ref Range    TSH 1.60 0.30 - 5.00 uIU/mL   CBC with platelets and differential   Result Value Ref Range    WBC Count 6.7 4.0 - 11.0 10e3/uL    RBC Count 4.09 3.80 - 5.20 10e6/uL    Hemoglobin 13.4 11.7 - 15.7 g/dL    Hematocrit 39.8 35.0 - 47.0 %    MCV 97 78 - 100 fL    MCH 32.8 26.5 - 33.0 pg    MCHC 33.7 31.5 - 36.5 g/dL    RDW 14.6 10.0 - 15.0 %    Platelet Count 193 150 - 450 10e3/uL    % Neutrophils 63 %    % Lymphocytes 23 %    % Monocytes 11 %    % Eosinophils 2 %    % Basophils 1 %    % Immature Granulocytes 0 %    NRBCs per 100 WBC 0 <1 /100    Absolute Neutrophils 4.2 1.6 - 8.3 10e3/uL    Absolute Lymphocytes 1.6 0.8 - 5.3 10e3/uL    Absolute Monocytes 0.7 0.0 - 1.3 10e3/uL    Absolute Eosinophils 0.1 0.0 - 0.7 10e3/uL    Absolute  Basophils 0.0 0.0 - 0.2 10e3/uL    Absolute Immature Granulocytes 0.0 <=0.4 10e3/uL    Absolute NRBCs 0.0 10e3/uL   ECG 12-LEAD WITH MUSE (LHE)   Result Value Ref Range    Systolic Blood Pressure  mmHg    Diastolic Blood Pressure  mmHg    Ventricular Rate 129 BPM    Atrial Rate 105 BPM    HI Interval  ms    QRS Duration 72 ms     ms    QTc 451 ms    P Axis  degrees    R AXIS 35 degrees    T Axis 63 degrees    Interpretation ECG       Accelerated Junctional rhythm with retrograde conduction  Nonspecific ST abnormality  Abnormal ECG  When compared with ECG of 26-OCT-2021 12:23,  Junctional rhythm has replaced Sinus rhythm  Vent. rate has increased BY  66 BPM  ST now depressed in Inferior leads  ST now depressed in Lateral leads  Confirmed by SEE ED PROVIDER NOTE FOR, ECG INTERPRETATION ([id number]),  GERALDINE HUSTON ([id number]) on 8/21/2023 3:12:33 PM     ECG 12-LEAD WITH MUSE (LHE)   Result Value Ref Range    Systolic Blood Pressure 109 mmHg    Diastolic Blood Pressure 65 mmHg    Ventricular Rate 62 BPM    Atrial Rate 62 BPM    HI Interval 194 ms    QRS Duration 80 ms     ms    QTc 412 ms    P Axis 63 degrees    R AXIS 17 degrees    T Axis 57 degrees    Interpretation ECG       Sinus rhythm  Normal ECG  When compared with ECG of 21-AUG-2023 13:09,  Sinus rhythm has replaced Junctional rhythm  Vent. rate has decreased BY  67 BPM  ST no longer depressed in Inferior leads  ST no longer depressed in Lateral leads  Confirmed by SEE ED PROVIDER NOTE FOR, ECG INTERPRETATION ([id number]),  GERALDINE HUSTON ([id number]) on 8/21/2023 3:11:35 PM         RADIOLOGY:  No orders to display          EKG:    Accelerated junctional rhythm with retrograde conduction, no specific ST acute ischemic changes, no concerning interval prolongation, when compared to ECG of October 26, 2021, junctional rhythm has replaced sinus rhythm    Repeat ECG shows sinus rhythm, no specific ST acute ischemic changes, no concerning dysrhythmias or  interval prolongation, when compared to ECG from earlier today sinus rhythm has not replaced a junctional rhythm  I have independently reviewed and interpreted the EKG(s) documented above.        I, Elke Vraela, am serving as a scribe to document services personally performed by Gonzalo Dukes MD, based on my observation and the provider's statements to me. I, Gonzalo Dukes MD attest that Elke Varela is acting in a scribe capacity, has observed my performance of the services and has documented them in accordance with my direction.    Gonzalo Dukes M.D.  Emergency Medicine  [facility] EMERGENCY ROOM  [facility] [facility] [id number] [phone]  Dept: [phone]     Gonzalo Dukes MD  08/21/23 2226

## 2023-08-21 NOTE — TELEPHONE ENCOUNTER
"Pt calls to report heart rate 144 at rest.  Current BP 74/52.    Discussed potentially calling 911.  However pt states \"Feeling fine.\"  Denies any faintness, dizziness or weakness.  Discussed ED eval is necessary nonetheless.  History SVT.  Pt agrees; states \"Dr said it was okay if it happens for a few minutes.\"  However, now \"ongoing three hours.\"    Reports drinking some water.  Now rechecking BP and pulse ...  Current /65.  Heart rate 149.    Encouraged pt to seek prompt ED eval.  Discussed current situational logistics.  Pt intends to drive self.  Understands she may call 911 at any point enroute if needed.    Akanksha ALEMAN [facility] Nurse Advisor     Reason for Disposition   Heart beating very rapidly (e.g., > 140 / minute) and present now  (Exception: During exercise.)    Additional Information   Negative: Passed out (i.e., lost consciousness, collapsed and was not responding)   Negative: Shock suspected (e.g., cold/pale/clammy skin, too weak to stand, low BP, rapid pulse)   Negative: Difficult to awaken or acting confused (e.g., disoriented, slurred speech)   Negative: Visible sweat on face or sweat dripping down face   Negative: Unable to walk, or can only walk with assistance (e.g., requires support)   Negative: Received SHOCK from implantable cardiac defibrillator and has persisting symptoms (i.e., palpitations, lightheadedness)   Negative: Dizziness, lightheadedness, or weakness and heart beating very rapidly (e.g., > 140 / minute)   Negative: Dizziness, lightheadedness, or weakness and heart beating very slowly (e.g., < 50 / minute)   Negative: Sounds like a life-threatening emergency to the triager   Negative: Chest pain   Negative: Difficulty breathing   Negative: Dizziness, lightheadedness, or weakness    Protocols used: Heart Rate and Heartbeat [id number]-QC    "

## 2023-08-22 NOTE — PATIENT INSTRUCTIONS
Follow-up after emergency department visit yesterday August 21, 2023 because of tachycardia    Kim comes to today's visit accompanied by her  Lon.  They sold their home in [place] and moved into senior living at [facility].  They will be in a 2 bedroom apartment, which includes a solarium.  Kim still drives.  She and  Lon moved in the [facility] senior community in [place]    More than 2 hours of supraventricular tachycardia documented August 21, 2023, furthermore Ms. Valdes reports that symptoms of palpitation are becoming more frequent  I agree with the increase of her diltiazem dose, going from 120 to 180 mg of the sustained-release taken daily (she will  that prescription today)  I am stopping the hydrochlorothiazide component of what had been losartan hydrochlorothiazide, but we will continue her on losartan, because she had elevated kidney function markers seen in the emergency department, and could have been a bit dehydrated.  Furthermore she needs a little bit more room to tolerate additional medication that may have blood pressure lowering effects  We will also order for her echocardiogram and cardiology clinic consultation, for consideration of antiarrhythmic medication (either daily dose or pill in a pocket)    8-, 13:09  Accelerated Junctional rhythm with retrograde conduction  Nonspecific ST abnormality  Abnormal ECG  When compared with ECG of 26-OCT-2021 12:23,  Junctional rhythm has replaced Sinus rhythm  Vent. rate has increased BY  66 BPM  ST now depressed in Inferior leads  ST now depressed in Lateral leads  Confirmed by SEE ED PROVIDER NOTE FOR, ECG INTERPRETATION ([id number]),  GERALDINE HUSTON ([id number]) on 8/21/2023 3:12:33 PM    8-, 14:22  Sinus rhythm  Normal ECG  When compared with ECG of 21-AUG-2023 13:09,  Sinus rhythm has replaced Junctional rhythm  Vent. rate has decreased BY  67 BPM  ST no longer depressed in Inferior  leads  ST no longer depressed in Lateral leads  Confirmed by SEE ED PROVIDER NOTE FOR, ECG INTERPRETATION ([id number]),  GERALDINE HUSTON ([id number]) on 8/21/2023 3:11:35 PM    Cardiac event monitoring from 11/28/2022 to 12/27/2022 (monitored duration 27d 5h 23m).  Baseline rhythm was sinus rhythm with PACs 65bpm.    Reported heart rate range 50 to 164bpm, average 67bpm.  7 symptom triggers (palpitations) correlated to sinus rhythm with first degree AV block, PACs, nonsustained supraventricular tachycardia.  Automated recordings included episodes of apparently sustained regular supraventricular tachycardia (144-164bpm, at least 4 episodes, 12/6/2022, two on 12/12/2022, 12/16/2022), nonsustained supraventricular tachycardia, 1st degree AV block, PACs, PVCs.  No atrial fibrillation.  There were no pauses noted.  SVT episodes (duration, onset, offset) are not well characterized on this monitoring modality.  Supraventricular and ventricular ectopic beat frequency are not reported on this monitoring modality.       Systolic ejection murmur best heard right upper sternal border, which is probably aortic sclerosis, doubt stenosis    Hypertension with elevated systolic component  As of August 22, 2023, stopping the hydrochlorothiazide component, but keeping her on losartan 50 mg a day    Diltiazem extended release dose increasing from 120 to 180 mg a day as of August 22, 2023, in order to better suppress SVT    Prerenal azotemia --which is the reason for stopping the hydrochlorothiazide  8-  Creatinine 0.60 - 1.10 mg/dL 1.52 High      Urea Nitrogen 8 - 28 mg/dL 32 High          Latest Reference Range & Units 11/08/22 11:40   Urea Nitrogen 8.0 - 23.0 mg/dL 28.8 (H)   Creatinine 0.51 - 0.95 mg/dL 1.19 (H)   GFR Estimate >60 mL/min/1.73m2 44 (L)     Weight loss, probably because of the stress of selling the house they lived in for 20 years, and moving into [facility].     Wt Readings from Last 5 Encounters:   08/22/23 54  kg (119 lb)   08/21/23 54 kg (119 lb)   05/09/23 53.5 kg (118 lb)   11/08/22 57.2 kg (126 lb 1.6 oz)   04/27/22 57.2 kg (126 lb)     She does the cooking in their new apartment, and they take 1 meal per week offered by [facility]  I told her to not lose anymore weight, I like to see her add back maybe 10 pounds.     [facility] offers exercise classes, but she is hesitant to go to them because she wants to stay close to Lon, who needs frequent monitoring.     Left shoulder pain-- doing better-- after a fall December 2021  Slipped on black ice December 26, 2021, sustaining a contusion of her left upper arm, possibly injuring her shoulder.  As of November 8, 2022, she recovered good range of motion of the left shoulder, able to raise her arms all the way up over her head.     Advanced lumbar spondylosis and central canal stenosis with Neurogenic claudication and right lower extremity radiculopathic symptoms with subtle findings of distal muscle weakness  [facility] 10/5/2021, received a right sided epidural steroid injection directed at the right L5 nerve root, additional diagnosis of L4-5 spondylolisthesis with severe central canal stenosis and neurogenic claudication, also severe L4-S1 facet arthritis with inflammation of the right L4 and L5 pedicles     Keep using Swiss rubber ball, which might help her do core muscle strengthening exercises for her lower back and abdominals.  EXAM: MR LUMBAR SPINE WO CONTRAST  LOCATION: [facility]  DATE/TIME: 2/11/2020 2:24 PM     IMPRESSION:   1.  Multilevel degenerative disc disease of the lumbar spine with symmetric disc bulges, most pronounced at the L4/L5 level where there is severe spinal canal and bilateral lateral recess narrowing (AP diameter the spinal canal measuring 3 mm).  2.  Advanced bilateral facet arthropathy and bilateral facet joint effusions at L4/L5. Flexion and extension views could be performed to evaluate for dynamic instability.  3.   Multilevel posterolateral disc disease and facet arthropathy with multilevel neural foraminal narrowing, most pronounced at the L4/L5 level where there is moderate to severe right and moderate left neural foraminal narrowing.     Continue with a conservative management strategy of exercise and maximizing mobility.     Cervical spondylosis, with central canal stenosis at C6-C7 level, with mild compression of the cervical spinal cord, but no evidence of myelomalacia by MRI March 5, 2020  EXAM: MR CERVICAL SPINE WO CONTRAST  LOCATION: [facility]  DATE/TIME: 3/17/2020 1:46 PM  1.  Multilevel degenerative disc disease of the cervical spine with disc osteophyte complexes, most pronounced at the C5/C6 level where there is moderate to severe spinal canal narrowing and compression of the cervical spinal cord and at the C6/C7 level where there is moderate spinal canal narrowing and mild compression of the cervical spinal cord. No definite evidence of edema or myelomalacia at these levels.  2.  Multilevel uncovertebral joint disease and facet arthropathy with severe multilevel neural foraminal narrowing at C3/C4-C6/C7 as described.      History of benign monoclonal gammopathy.    Serum protein electrophoresis from specimen drawn October 23, 2019 showed no monoclonal protein.  I think we can stop checking.     Hyperlipidemia on treatment  Continue on atorvastatin 10 milligrams a day       Latest Reference Range & Units 11/08/22 11:40   Cholesterol <200 mg/dL 161   Glucose 70 - 99 mg/dL 108 (H)   HDL Cholesterol >=50 mg/dL 74   LDL Cholesterol Calculated <=100 mg/dL 69   Non HDL Cholesterol <130 mg/dL 87   Triglycerides <150 mg/dL 89      Menopause with history of previous left oophrectomy.  Uterus is still in place.    Latest Reference Range & Units 11/08/22 11:40   TSH 0.30 - 4.20 uIU/mL 2.07      Probably has at least osteopenia, probably osteoporosis, and I would like her to continue taking calcium and vitamin D  supplement.  The amount of calcium is 1000 mg of elemental calcium per day.  The form of calcium can be calcium carbonate (for example Tums) or calcium citrate (Citracal or Caltrate)  The amount of the element calcium is reported on the label of these products.  Also over-the-counter vitamin D 2000 units a day.     History of adenomatous colon polyp.  Last colonoscopy was October 2015, and she has stopped colon screening.  The October 2015 colonoscopy showed diverticulosis, no polyps.  She can stop colon screening at this point.     Vulvar lichen sclerosus, for which she uses topical steroid ointment with good effect.     She has received Prevnar 13 and pneumococcal 23 polysaccharide already.

## 2023-08-22 NOTE — TELEPHONE ENCOUNTER
Pharmacy called and new script is not available in tablet:     Disp Refills Start End PINA   diltiazem ER (CARDIAZEM LA) 180 MG 24 hr tablet 30 tablet 3 8/22/2023  No   Sig - Route: Take 1 tablet (180 mg) by mouth daily - Oral   Sent to pharmacy as: dilTIAZem HCl  MG Oral Tablet Extended Release 24 Hour (CARDIAZEM LA)   Class: E-Prescribe   Notes to Pharmacy: Dose increase     Notes did not indicate need for tablet and with ER formulation not appropriate to split or crush.    RN gave verbal ok to change to capsule formulation.    TEGAN Carlson  [facility]

## 2023-08-22 NOTE — PROGRESS NOTES
Office Visit - Follow Up   Kim Valdes   88 year old female    Date of Visit: 8/22/2023    Chief Complaint   Patient presents with    Follow Up     ER- follow up- tachycardia        -------------------------------------------------------------------------------------------------------------------------  Assessment and Plan    Follow-up after emergency department visit yesterday August 21, 2023 because of tachycardia    Kim comes to today's visit accompanied by her  Lon.  They sold their home in [place] and moved into senior living at [facility].  They will be in a 2 bedroom apartment, which includes a solarium.  Kim still drives.  She and  Lon moved in the [facility] senior community in [place]    More than 2 hours of supraventricular tachycardia documented August 21, 2023, furthermore Ms. Valdes reports that symptoms of palpitation are becoming more frequent  I agree with the increase of her diltiazem dose, going from 120 to 180 mg of the sustained-release taken daily (she will  that prescription today)  I am stopping the hydrochlorothiazide component of what had been losartan hydrochlorothiazide, but we will continue her on losartan, because she had elevated kidney function markers seen in the emergency department, and could have been a bit dehydrated.  Furthermore she needs a little bit more room to tolerate additional medication that may have blood pressure lowering effects  We will also order for her echocardiogram and cardiology clinic consultation, for consideration of antiarrhythmic medication (either daily dose or pill in a pocket)    8-, 13:09  Accelerated Junctional rhythm with retrograde conduction  Nonspecific ST abnormality  Abnormal ECG  When compared with ECG of 26-OCT-2021 12:23,  Junctional rhythm has replaced Sinus rhythm  Vent. rate has increased BY  66 BPM  ST now depressed in Inferior leads  ST now depressed in Lateral  leads  Confirmed by SEE ED PROVIDER NOTE FOR, ECG INTERPRETATION ([id number]),  GERALDINE HUSTON ([id number]) on 8/21/2023 3:12:33 PM    8-, 14:22  Sinus rhythm  Normal ECG  When compared with ECG of 21-AUG-2023 13:09,  Sinus rhythm has replaced Junctional rhythm  Vent. rate has decreased BY  67 BPM  ST no longer depressed in Inferior leads  ST no longer depressed in Lateral leads  Confirmed by SEE ED PROVIDER NOTE FOR, ECG INTERPRETATION ([id number]),  GERALDINE HUSTON ([id number]) on 8/21/2023 3:11:35 PM    Cardiac event monitoring from 11/28/2022 to 12/27/2022 (monitored duration 27d 5h 23m).  Baseline rhythm was sinus rhythm with PACs 65bpm.    Reported heart rate range 50 to 164bpm, average 67bpm.  7 symptom triggers (palpitations) correlated to sinus rhythm with first degree AV block, PACs, nonsustained supraventricular tachycardia.  Automated recordings included episodes of apparently sustained regular supraventricular tachycardia (144-164bpm, at least 4 episodes, 12/6/2022, two on 12/12/2022, 12/16/2022), nonsustained supraventricular tachycardia, 1st degree AV block, PACs, PVCs.  No atrial fibrillation.  There were no pauses noted.  SVT episodes (duration, onset, offset) are not well characterized on this monitoring modality.  Supraventricular and ventricular ectopic beat frequency are not reported on this monitoring modality.       Systolic ejection murmur best heard right upper sternal border, which is probably aortic sclerosis, doubt stenosis    Hypertension with elevated systolic component  As of August 22, 2023, stopping the hydrochlorothiazide component, but keeping her on losartan 50 mg a day    Diltiazem extended release dose increasing from 120 to 180 mg a day as of August 22, 2023, in order to better suppress SVT    Prerenal azotemia --which is the reason for stopping the hydrochlorothiazide  8-  Creatinine 0.60 - 1.10 mg/dL 1.52 High      Urea Nitrogen 8 - 28 mg/dL 32 High          Latest  Reference Range & Units 11/08/22 11:40   Urea Nitrogen 8.0 - 23.0 mg/dL 28.8 (H)   Creatinine 0.51 - 0.95 mg/dL 1.19 (H)   GFR Estimate >60 mL/min/1.73m2 44 (L)     Weight loss, probably because of the stress of selling the house they lived in for 20 years, and moving into [facility].     Wt Readings from Last 5 Encounters:   08/22/23 54 kg (119 lb)   08/21/23 54 kg (119 lb)   05/09/23 53.5 kg (118 lb)   11/08/22 57.2 kg (126 lb 1.6 oz)   04/27/22 57.2 kg (126 lb)     She does the cooking in their new apartment, and they take 1 meal per week offered by [facility]  I told her to not lose anymore weight, I like to see her add back maybe 10 pounds.     [facility] offers exercise classes, but she is hesitant to go to them because she wants to stay close to Lon, who needs frequent monitoring.     Left shoulder pain-- doing better-- after a fall December 2021  Slipped on black ice December 26, 2021, sustaining a contusion of her left upper arm, possibly injuring her shoulder.  As of November 8, 2022, she recovered good range of motion of the left shoulder, able to raise her arms all the way up over her head.     Advanced lumbar spondylosis and central canal stenosis with Neurogenic claudication and right lower extremity radiculopathic symptoms with subtle findings of distal muscle weakness  [facility] 10/5/2021, received a right sided epidural steroid injection directed at the right L5 nerve root, additional diagnosis of L4-5 spondylolisthesis with severe central canal stenosis and neurogenic claudication, also severe L4-S1 facet arthritis with inflammation of the right L4 and L5 pedicles     Keep using Swiss rubber ball, which might help her do core muscle strengthening exercises for her lower back and abdominals.  EXAM: MR LUMBAR SPINE WO CONTRAST  LOCATION: [facility]  DATE/TIME: 2/11/2020 2:24 PM     IMPRESSION:   1.  Multilevel degenerative disc disease of the lumbar spine with symmetric  disc bulges, most pronounced at the L4/L5 level where there is severe spinal canal and bilateral lateral recess narrowing (AP diameter the spinal canal measuring 3 mm).  2.  Advanced bilateral facet arthropathy and bilateral facet joint effusions at L4/L5. Flexion and extension views could be performed to evaluate for dynamic instability.  3.  Multilevel posterolateral disc disease and facet arthropathy with multilevel neural foraminal narrowing, most pronounced at the L4/L5 level where there is moderate to severe right and moderate left neural foraminal narrowing.     Continue with a conservative management strategy of exercise and maximizing mobility.     Cervical spondylosis, with central canal stenosis at C6-C7 level, with mild compression of the cervical spinal cord, but no evidence of myelomalacia by MRI March 5, 2020  EXAM: MR CERVICAL SPINE WO CONTRAST  LOCATION: [facility]  DATE/TIME: 3/17/2020 1:46 PM  1.  Multilevel degenerative disc disease of the cervical spine with disc osteophyte complexes, most pronounced at the C5/C6 level where there is moderate to severe spinal canal narrowing and compression of the cervical spinal cord and at the C6/C7 level where there is moderate spinal canal narrowing and mild compression of the cervical spinal cord. No definite evidence of edema or myelomalacia at these levels.  2.  Multilevel uncovertebral joint disease and facet arthropathy with severe multilevel neural foraminal narrowing at C3/C4-C6/C7 as described.      History of benign monoclonal gammopathy.    Serum protein electrophoresis from specimen drawn October 23, 2019 showed no monoclonal protein.  I think we can stop checking.     Hyperlipidemia on treatment  Continue on atorvastatin 10 milligrams a day       Latest Reference Range & Units 11/08/22 11:40   Cholesterol <200 mg/dL 161   Glucose 70 - 99 mg/dL 108 (H)   HDL Cholesterol >=50 mg/dL 74   LDL Cholesterol Calculated <=100 mg/dL 69   Non HDL  Cholesterol <130 mg/dL 87   Triglycerides <150 mg/dL 89      Menopause with history of previous left oophrectomy.  Uterus is still in place.    Latest Reference Range & Units 11/08/22 11:40   TSH 0.30 - 4.20 uIU/mL 2.07      Probably has at least osteopenia, probably osteoporosis, and I would like her to continue taking calcium and vitamin D supplement.  The amount of calcium is 1000 mg of elemental calcium per day.  The form of calcium can be calcium carbonate (for example Tums) or calcium citrate (Citracal or Caltrate)  The amount of the element calcium is reported on the label of these products.  Also over-the-counter vitamin D 2000 units a day.     History of adenomatous colon polyp.  Last colonoscopy was October 2015, and she has stopped colon screening.  The October 2015 colonoscopy showed diverticulosis, no polyps.  She can stop colon screening at this point.     Vulvar lichen sclerosus, for which she uses topical steroid ointment with good effect.     She has received Prevnar 13 and pneumococcal 23 polysaccharide already.      --------------------------------------------------------------------------------------------------------------------------  History of Present Illness  This 88 year old old     Follow-up after emergency department visit yesterday August 21, 2023 because of tachycardia    Kim comes to today's visit accompanied by her  Lon.  They sold their home in [place] and moved into senior living at [facility].  They will be in a 2 bedroom apartment, which includes a solarium.  Kim still drives.  She and  Lon moved in the [facility] senior community in [place]    More than 2 hours of supraventricular tachycardia documented August 21, 2023, furthermore Ms. Valdes reports that symptoms of palpitation are becoming more frequent  I agree with the increase of her diltiazem dose, going from 120 to 180 mg of the sustained-release taken daily (she will   that prescription today)  I am stopping the hydrochlorothiazide component of what had been losartan hydrochlorothiazide, but we will continue her on losartan, because she had elevated kidney function markers seen in the emergency department, and could have been a bit dehydrated.  Furthermore she needs a little bit more room to tolerate additional medication that may have blood pressure lowering effects  We will also order for her echocardiogram and cardiology clinic consultation, for consideration of antiarrhythmic medication (either daily dose or pill in a pocket)    8-, 13:09  Accelerated Junctional rhythm with retrograde conduction  Nonspecific ST abnormality  Abnormal ECG  When compared with ECG of 26-OCT-2021 12:23,  Junctional rhythm has replaced Sinus rhythm  Vent. rate has increased BY  66 BPM  ST now depressed in Inferior leads  ST now depressed in Lateral leads  Confirmed by SEE ED PROVIDER NOTE FOR, ECG INTERPRETATION ([id number]),  GERALDINE HUSTON ([id number]) on 8/21/2023 3:12:33 PM    8-, 14:22  Sinus rhythm  Normal ECG  When compared with ECG of 21-AUG-2023 13:09,  Sinus rhythm has replaced Junctional rhythm  Vent. rate has decreased BY  67 BPM  ST no longer depressed in Inferior leads  ST no longer depressed in Lateral leads  Confirmed by SEE ED PROVIDER NOTE FOR, ECG INTERPRETATION ([id number]),  GERALDINE HUSTON ([id number]) on 8/21/2023 3:11:35 PM    Wt Readings from Last 3 Encounters:   08/22/23 54 kg (119 lb)   08/21/23 54 kg (119 lb)   05/09/23 53.5 kg (118 lb)     BP Readings from Last 3 Encounters:   08/22/23 128/58   08/21/23 124/58   05/09/23 110/72     ---------------------------------------------------------------------------------------------------------------------------    Medications, Allergies, Social, and Problem List   MED REC REQUIRED  Post Medication Reconciliation Status: discharge medications reconciled, continue medications without change      Current Outpatient  "Medications   Medication Sig Dispense Refill    aspirin 81 MG EC tablet [ASPIRIN 81 MG EC TABLET] Take 81 mg by mouth every evening.      atorvastatin (LIPITOR) 10 MG tablet Take 1 tablet (10 mg) by mouth At Bedtime 90 tablet 3    diltiazem ER COATED BEADS (CARDIZEM CD/CARTIA XT) 120 MG 24 hr capsule Take 1 capsule (120 mg) by mouth daily 90 capsule 3    losartan-hydrochlorothiazide (HYZAAR) 50-12.5 MG tablet Take 1 tablet by mouth once daily 90 tablet 3    mometasone (ELOCON) 0.1 % ointment [MOMETASONE (ELOCON) 0.1 % OINTMENT] Apply thin layer QD to affected area when symptoms present, once a week for maintenance      diltiazem ER (CARDIAZEM LA) 180 MG 24 hr tablet Take 1 tablet (180 mg) by mouth daily for 15 days (Patient not taking: Reported on 8/22/2023) 15 tablet 0     Allergies   Allergen Reactions    Omeprazole Unknown     Social History     Tobacco Use    Smoking status: Never    Smokeless tobacco: Never   Vaping Use    Vaping Use: Never used     Patient Active Problem List   Diagnosis    Symptomatic Menopause    Hypercholesteremia    Adenomatous polyp    SVT (supraventricular tachycardia) (H)    Essential hypertension    Renal insufficiency    Spinal stenosis of lumbar region with neurogenic claudication    Cervical stenosis of spinal canal        Reviewed, reconciled and updated       Physical Exam   General Appearance:       /58 (BP Location: Right arm, Patient Position: Sitting)   Pulse 56   Temp 98.1  F (36.7  C)   Resp 20   Ht 1.676 m (5' 6\")   Wt 54 kg (119 lb)   SpO2 95%   BMI 19.21 kg/m      Appears well.  Heart rhythm is regular today.  Lungs clear.  No edema.     Additional Information   I spent 30 minutes on this encounter, including reviewing interval history since last visit, examining the patient, explaining and counseling the issues enumerated in the Assessment and Plan (patient given a copy), ordering indicated tests, ordering prescriptions       GANGA FRANCIS MD, MD    "

## 2023-09-19 NOTE — PATIENT INSTRUCTIONS
"Increase diltiazem to 240 mg daily  Try using the \"vagal maneuvers\" we discussed to interrupt the episodes.  Coughing forcefully, straining as if you were moving her bowels, putting her face a bowl of ice water for 10 seconds are options.  We will schedule you to meet with an electrophysiologist to discuss the ablation procedure  "

## 2023-09-19 NOTE — LETTER
9/19/2023    GANGA FRANCIS MD  [address] [place] [address]    RE: Kim Valdes       Dear Colleague,     I had the pleasure of seeing Kim Valdes in the [facility].    CARDIOLOGY RAPID ACCESS CLINIC CONSULT NOTE     Assessment/Plan:   1.  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  Recurrent episodes on increased dose of diltiazem suggest she would be best treated with an ablation.  Previously failed atenolol.  Try increasing diltiazem to 240 mg daily, schedule EP evaluation.  She is agreeable to ablation procedure at this time.  2.  Hypertension, with less than ideal control today.  May be improved on increased diltiazem.       History of Present Illness:     It is my pleasure to see Kim Valdes at the [facility] RAPID ACCESS clinic for evaluation of supraventricular tachycardia.  She is accompanied by her  Lon who suffers from dementia.    Kim Valdes is a 88 year old female with a past medical history of hypertension, and paroxysmal supraventricular tachycardia.  These episodes date back for a couple of decades.  She was hospitalized in 2015 after a prolonged episode.  At that time atenolol was discontinued, and switched to diltiazem.  An ablation procedure was offered at that time, but declined.  Since then she has had occasional episodes of palpitation.  These were noted on a 30-day monitor last November to include 4 episodes.  She feels that they occur a couple times a month at this point and usually last only a few minutes.  They frequently occur when she is hurrying or moving fast.  They are frightening, with a sensation of her heart pounding.  They have not awakened her from her sleep.  There is no associated chest pain or lightheadedness.  She has had no associated falls or syncope.  She generally lays down and rests until the episodes resolve.      Last 6 months she and her  moved into a  assisted living setting, she no longer walks stairs.  She does do water aerobics occasionally and walks without difficulty.  Her activity tolerance is stable.  She has no chest pain or shortness of breath.  Last month she had an episode lasting for hours, possibly the longest episode she has experienced.  At that time the episode spontaneously resolved in the emergency room without treatment.  Diltiazem was increased to 180 mg daily.  Over the last month she has had 2 more episodes, each brief and about 5 minutes.  She does not recall ever having tried vagal maneuvers for these episodes.    Past Medical History:     Patient Active Problem List   Diagnosis    Symptomatic Menopause    Hypercholesteremia    Adenomatous polyp    SVT (supraventricular tachycardia) (H)    Essential hypertension    Renal insufficiency    Spinal stenosis of lumbar region with neurogenic claudication    Cervical stenosis of spinal canal       Past Surgical History:     Past Surgical History:   Procedure Laterality Date    APPENDECTOMY  age 13    LAPAROSCOPIC CHOLECYSTECTOMY  mid 60's    OOPHORECTOMY Left age 61       Family History:     Family History   Problem Relation Age of Onset    Coronary Artery Disease Father     Cerebrovascular Disease Mother      Family history reviewed and is not pertinent to the chief complaint or presenting problem    Social History:    reports that she has never smoked. She has never used smokeless tobacco.    Exercise: Walking, occasional swimming.  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      atorvastatin (LIPITOR) 10 MG tablet Take 1 tablet (10 mg) by mouth At Bedtime 90 tablet 3    diltiazem ER (CARDIAZEM LA) 180 MG 24 hr tablet Take 1 tablet (180 mg) by mouth daily 30 tablet 3    losartan (COZAAR) 50 MG tablet Take 1 tablet (50  mg) by mouth daily 90 tablet 3    mometasone (ELOCON) 0.1 % ointment [MOMETASONE (ELOCON) 0.1 % OINTMENT] Apply thin layer QD to affected area when symptoms present, once a week for maintenance         Allergies:   Omeprazole    Review of Systems:     Positive for rapid irregular heartbeat.  12 point review of systems otherwise negative     Please refer above for cardiac ROS details.       Objective:      Physical Exam  54 kg (119 lb)     Body mass index is 19.21 kg/m .  BP (!) 160/55 (BP Location: Right arm, Patient Position: Sitting, Cuff Size: Adult Regular)   Pulse 70   Resp 16   Wt 54 kg (119 lb)   SpO2 97%   BMI 19.21 kg/m          General Appearance : Thin ,Alert, No acute distress  HEENT: No Scleral icterus; the mucous membranes were pink and moist.  Conjunctivae not injected  Neck:  No cervical bruits, jugular venous distention, or thyromegaly   Chest: The spine was moderately kyphotic  Lungs: Respirations unlabored; the lungs are clear to auscultation.  No wheezing   Cardiovascular:   Normal point of maximal impulse.  Auscultation reveals Giller first and second heart sounds with no murmurs, rubs, or gallops.  Carotid, radial, and dorsalis pedal pulses are intact and symmetric.    Abdomen: No organomegaly, masses, bruits, or tenderness. Bowels sounds are present  Extremities: No edema.  Skin: No xanthelasma. Warm, Dry.  Musculoskeletal: No tenderness.  Neurologic: Alert and oriented ×3. Speech is fluent.      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  Follow-up study shows normal sinus rhythm    Cardiac event monitoring from 11/28/2022 to 12/27/2022 (monitored duration 27d 5h 23m).  Baseline rhythm was sinus rhythm with PACs 65bpm.    Reported heart rate range 50 to 164bpm, average 67bpm.  7 symptom triggers (palpitations) correlated to sinus rhythm with first degree AV block, PACs, nonsustained supraventricular  tachycardia.  Automated recordings included episodes of apparently sustained regular supraventricular tachycardia (144-164bpm, at least 4 episodes, 12/6/2022, two on 12/12/2022, 12/16/2022), nonsustained supraventricular tachycardia, 1st degree AV block, PACs, PVCs.  No atrial fibrillation.  There were no pauses noted.  SVT episodes (duration, onset, offset) are not well characterized on this monitoring modality.  Supraventricular and ventricular ectopic beat frequency are not reported on this monitoring modality.      Cardiac Imaging Studies:  Echocardiogram 8/2023:  Left ventricular size, wall motion and function are normal. The ejection  fraction is 60-65%.  Normal right ventricle size and systolic function.  There is mild (1+) aortic regurgitation.  There is mild (1+) mitral regurgitation.    Lab Review   Lab Results   Component Value Date     08/21/2023    CO2 26 08/21/2023    BUN 32 08/21/2023     Lab Results   Component Value Date    WBC 6.7 08/21/2023    HGB 13.4 08/21/2023    HCT 39.8 08/21/2023    MCV 97 08/21/2023     08/21/2023     Lab Results   Component Value Date    CHOL 161 11/08/2022    TRIG 89 11/08/2022    HDL 74 11/08/2022     Lab Results   Component Value Date    TROPONINI 0.02 08/21/2023     No results found for: BNP  Lab Results   Component Value Date    TSH 1.60 08/21/2023       Dov Sky MD, MD [facility]      This note created using Dragon voice recognition software. Sound alike errors may have escaped editing.       Thank you for allowing me to participate in the care of your patient.      Sincerely,     Dov Sky MD     [facility]  cc:   Gonzalo Dukes MD  [facility] [place] [address]

## 2023-09-19 NOTE — PROGRESS NOTES
CARDIOLOGY RAPID ACCESS CLINIC CONSULT NOTE     Assessment/Plan:   1.  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  Recurrent episodes on increased dose of diltiazem suggest she would be best treated with an ablation.  Previously failed atenolol.  Try increasing diltiazem to 240 mg daily, schedule EP evaluation.  She is agreeable to ablation procedure at this time.  2.  Hypertension, with less than ideal control today.  May be improved on increased diltiazem.       History of Present Illness:     It is my pleasure to see Kim Valdes at the [facility] RAPID ACCESS clinic for evaluation of supraventricular tachycardia.  She is accompanied by her  Lon who suffers from dementia.    Kim Valdes is a 88 year old female with a past medical history of hypertension, and paroxysmal supraventricular tachycardia.  These episodes date back for a couple of decades.  She was hospitalized in 2015 after a prolonged episode.  At that time atenolol was discontinued, and switched to diltiazem.  An ablation procedure was offered at that time, but declined.  Since then she has had occasional episodes of palpitation.  These were noted on a 30-day monitor last November to include 4 episodes.  She feels that they occur a couple times a month at this point and usually last only a few minutes.  They frequently occur when she is hurrying or moving fast.  They are frightening, with a sensation of her heart pounding.  They have not awakened her from her sleep.  There is no associated chest pain or lightheadedness.  She has had no associated falls or syncope.  She generally lays down and rests until the episodes resolve.      Last 6 months she and her  moved into a assisted living setting, she no longer walks stairs.  She does do water aerobics occasionally and walks without difficulty.  Her activity tolerance is stable.  She has no chest pain or shortness of  breath.  Last month she had an episode lasting for hours, possibly the longest episode she has experienced.  At that time the episode spontaneously resolved in the emergency room without treatment.  Diltiazem was increased to 180 mg daily.  Over the last month she has had 2 more episodes, each brief and about 5 minutes.  She does not recall ever having tried vagal maneuvers for these episodes.    Past Medical History:     Patient Active Problem List   Diagnosis    Symptomatic Menopause    Hypercholesteremia    Adenomatous polyp    SVT (supraventricular tachycardia) (H)    Essential hypertension    Renal insufficiency    Spinal stenosis of lumbar region with neurogenic claudication    Cervical stenosis of spinal canal       Past Surgical History:     Past Surgical History:   Procedure Laterality Date    APPENDECTOMY  age 13    LAPAROSCOPIC CHOLECYSTECTOMY  mid 60's    OOPHORECTOMY Left age 61       Family History:     Family History   Problem Relation Age of Onset    Coronary Artery Disease Father     Cerebrovascular Disease Mother      Family history reviewed and is not pertinent to the chief complaint or presenting problem    Social History:    reports that she has never smoked. She has never used smokeless tobacco.    Exercise: Walking, occasional swimming.  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      atorvastatin (LIPITOR) 10 MG tablet Take 1 tablet (10 mg) by mouth At Bedtime 90 tablet 3    diltiazem ER (CARDIAZEM LA) 180 MG 24 hr tablet Take 1 tablet (180 mg) by mouth daily 30 tablet 3    losartan (COZAAR) 50 MG tablet Take 1 tablet (50 mg) by mouth daily 90 tablet 3    mometasone (ELOCON) 0.1 % ointment [MOMETASONE (ELOCON) 0.1 % OINTMENT] Apply thin layer QD to affected area when symptoms present, once a week for maintenance          Allergies:   Omeprazole    Review of Systems:     Positive for rapid irregular heartbeat.  12 point review of systems otherwise negative     Please refer above for cardiac ROS details.       Objective:      Physical Exam  54 kg (119 lb)     Body mass index is 19.21 kg/m .  BP (!) 160/55 (BP Location: Right arm, Patient Position: Sitting, Cuff Size: Adult Regular)   Pulse 70   Resp 16   Wt 54 kg (119 lb)   SpO2 97%   BMI 19.21 kg/m          General Appearance : Thin ,Alert, No acute distress  HEENT: No Scleral icterus; the mucous membranes were pink and moist.  Conjunctivae not injected  Neck:  No cervical bruits, jugular venous distention, or thyromegaly   Chest: The spine was moderately kyphotic  Lungs: Respirations unlabored; the lungs are clear to auscultation.  No wheezing   Cardiovascular:   Normal point of maximal impulse.  Auscultation reveals Giller first and second heart sounds with no murmurs, rubs, or gallops.  Carotid, radial, and dorsalis pedal pulses are intact and symmetric.    Abdomen: No organomegaly, masses, bruits, or tenderness. Bowels sounds are present  Extremities: No edema.  Skin: No xanthelasma. Warm, Dry.  Musculoskeletal: No tenderness.  Neurologic: Alert and oriented ×3. Speech is fluent.      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  Follow-up study shows normal sinus rhythm    Cardiac event monitoring from 11/28/2022 to 12/27/2022 (monitored duration 27d 5h 23m).  Baseline rhythm was sinus rhythm with PACs 65bpm.    Reported heart rate range 50 to 164bpm, average 67bpm.  7 symptom triggers (palpitations) correlated to sinus rhythm with first degree AV block, PACs, nonsustained supraventricular tachycardia.  Automated recordings included episodes of apparently sustained regular supraventricular tachycardia (144-164bpm, at least 4 episodes, 12/6/2022, two on 12/12/2022, 12/16/2022), nonsustained  supraventricular tachycardia, 1st degree AV block, PACs, PVCs.  No atrial fibrillation.  There were no pauses noted.  SVT episodes (duration, onset, offset) are not well characterized on this monitoring modality.  Supraventricular and ventricular ectopic beat frequency are not reported on this monitoring modality.      Cardiac Imaging Studies:  Echocardiogram 8/2023:  Left ventricular size, wall motion and function are normal. The ejection  fraction is 60-65%.  Normal right ventricle size and systolic function.  There is mild (1+) aortic regurgitation.  There is mild (1+) mitral regurgitation.    Lab Review   Lab Results   Component Value Date     08/21/2023    CO2 26 08/21/2023    BUN 32 08/21/2023     Lab Results   Component Value Date    WBC 6.7 08/21/2023    HGB 13.4 08/21/2023    HCT 39.8 08/21/2023    MCV 97 08/21/2023     08/21/2023     Lab Results   Component Value Date    CHOL 161 11/08/2022    TRIG 89 11/08/2022    HDL 74 11/08/2022     Lab Results   Component Value Date    TROPONINI 0.02 08/21/2023     No results found for: BNP  Lab Results   Component Value Date    TSH 1.60 08/21/2023       Dov Sky MD, MD [facility]      This note created using Dragon voice recognition software. Sound alike errors may have escaped editing.

## 2023-10-02 NOTE — LETTER
10/2/2023    BRAYN WAY MD  [address] [place] [address]    RE: Kim Valdes       Dear Colleague,     I had the pleasure of seeing Kim Valdes in the [facility].     [facility]  Cardiac Electrophysiology  [address]  [place] [address]   Office: [phone]  Fax: [phone]     Cardiac Electrophysiology Consultation    Patient: Kim Valdes   : 1935     Referring Provider: Dov Sky MD  Primary Care Provider: Bryan Way MD    CHIEF COMPLAINT/REASON FOR VISIT  Supraventricular tachycardia    Assessment/Recommendations   Kim Valdes is a 88 year old female with supraventricular tachycardia, HTN referred by Dr. Sky for consultation regarding SVT.    Supraventricular tachycardia - symptomatic with palpitations.  Likely AVNRT, though AVRT and AT are possible.  We reviewed SVT mechanisms and reviewed treatment options including electrophysiology study and ablation vs continued medical therapy.  We reviewed the nature of EP studies and ablation for SVT, success rates depending on inducibility and specific SVT mechanism, procedural risks (including groin hematoma, tamponade, heart block, stroke) and recovery expectations.  - she will take some time to consider options, and will be in touch with any questions or decision as to how she would like to proceed  - continue diltiazem ER 240mg daily.  She will contact us if her lower extremity swelling worsens.    Follow up: as above         History of Present Illness   Kim Valdes is a 88 year old female with supraventricular tachycardia, HTN referred by Dr. Sky for consultation regarding SVT.    Mrs. Valdes notes episodes of since since around .  She notes episodes occurring unpredictably, but often activity, and lasting usually between 15-30min, and usually resolve with resting and drinking water.  She underwent ER visit 2023 with SVT with note  "of spontaneous conversion to SR.  She had been on diltiazem ER - the dose was increased to 240mg daily after her ER visit.  She has noted increased lower extremity swelling since the dose increase.    She denies chest pain, syncope.  She is seen today with her  who has dementia, and her granddaughter.       Physical Examination  Review of Systems   VITALS: BP (!) 156/50 (BP Location: Right arm, Patient Position: Sitting, Cuff Size: Adult Regular)   Pulse 63   Resp 16   Ht 1.676 m (5' 6\")   Wt 54.4 kg (120 lb)   SpO2 98%   BMI 19.37 kg/m    Wt Readings from Last 3 Encounters:   09/19/23 54 kg (119 lb)   08/22/23 54 kg (119 lb)   08/21/23 54 kg (119 lb)     CONSTITUTIONAL: well nourished, comfortable, no distress  EYES:  Conjunctivae pink, sclerae clear.    E/N/T:  Oral mucosa pink  RESPIRATORY:  Respiratory effort is normal  CARDIOVASCULAR:  normal S1 and S2  GASTROINTESTINAL:  Abdomen without masses or tenderness  EXTREMITIES:  No clubbing or cyanosis.    MUSCULOSKELETAL:  Overall grossly normal muscle strength  SKIN:  Overall, skin warm and dry, no lesions.  NEURO/PSYCH:  Oriented x 3 with normal affect.   Constitutional:  No weight loss or loss of appetite    Eyes:  No difficulty with vision, no double vision, no dry eyes  ENT:  No sore throat, difficulty swallowing; changes in hearing or tinnitus  Cardiovascular: As detailed above  Respiratory:  No cough  Musculoskeletal  No joint pain, muscle aches  Neurologic:  No syncope, lightheadedness, fainting spells   Hematologic: No easy bruising, excessive bleeding tendency   Gastrointestinal:  No jaundice, abdominal pain or abdominal bloating  Genitourinary: No changes in urinary habits, no trouble urinating    Psychiatric: No anxiety or depression      Medical History  Surgical History   Past Medical History:   Diagnosis Date    Cholecystitis, unspecified     Created by Conversion     Past Surgical History:   Procedure Laterality Date    APPENDECTOMY  age " 13    LAPAROSCOPIC CHOLECYSTECTOMY  mid 60's    OOPHORECTOMY Left age 61         Family History Social History   Family History   Problem Relation Age of Onset    Coronary Artery Disease Father     Cerebrovascular Disease Mother         Social History     Tobacco Use    Smoking status: Never    Smokeless tobacco: Never   Vaping Use    Vaping Use: Never used         Medications  Allergies     Current Outpatient Medications:     aspirin 81 MG EC tablet, [ASPIRIN 81 MG EC TABLET] Take 81 mg by mouth every evening., Disp: , Rfl:     atorvastatin (LIPITOR) 10 MG tablet, Take 1 tablet (10 mg) by mouth At Bedtime, Disp: 90 tablet, Rfl: 3    diltiazem ER COATED BEADS (CARDIZEM CD/CARTIA XT) 240 MG 24 hr capsule, Take 1 capsule (240 mg) by mouth daily, Disp: 60 capsule, Rfl: 3    losartan (COZAAR) 50 MG tablet, Take 1 tablet (50 mg) by mouth daily, Disp: 90 tablet, Rfl: 3    mometasone (ELOCON) 0.1 % ointment, [MOMETASONE (ELOCON) 0.1 % OINTMENT] Apply thin layer QD to affected area when symptoms present, once a week for maintenance, Disp: , Rfl:      Allergies   Allergen Reactions    Omeprazole Unknown          Lab Results    Chemistry CBC Cardiac Enzymes/BNP/TSH/INR   Recent Labs   Lab Test 08/21/23  1324      POTASSIUM 4.7   CHLORIDE 104   CO2 26   *   BUN 32*   CR 1.52*   GFRESTIMATED 33*   ROSENDA 9.5     Recent Labs   Lab Test 08/21/23  1324 11/08/22  1140 10/26/21  1252   CR 1.52* 1.19* 1.11*          Recent Labs   Lab Test 08/21/23  1324   WBC 6.7   HGB 13.4   HCT 39.8   MCV 97        Recent Labs   Lab Test 08/21/23  1324 11/08/22  1140 10/26/21  1252   HGB 13.4 13.9 14.1    Recent Labs   Lab Test 08/21/23  1324   TROPONINI 0.02     No results for input(s): BNP, NTBNPI, NTBNP in the last 54963 hours.  Recent Labs   Lab Test 08/21/23  1324   TSH 1.60     No results for input(s): INR in the last 46420 hours.      Data Review    ECGs (tracings independently reviewed)  8/21/2023 - SR 62bpm, DC  194ms  8/21/2023 - short RP NCT 129bpm    Cardiac event monitoring from 11/28/2022 to 12/27/2022 (monitored duration 27d 5h 23m) (independently reviewed)  Baseline rhythm was sinus rhythm with PACs 65bpm.    Reported heart rate range 50 to 164bpm, average 67bpm.  7 symptom triggers (palpitations) correlated to sinus rhythm with first degree AV block, PACs, nonsustained supraventricular tachycardia.  Automated recordings included episodes of apparently sustained regular supraventricular tachycardia (144-164bpm, at least 4 episodes, 12/6/2022, two on 12/12/2022, 12/16/2022), nonsustained supraventricular tachycardia, 1st degree AV block, PACs, PVCs.  No atrial fibrillation.  There were no pauses noted.  SVT episodes (duration, onset, offset) are not well characterized on this monitoring modality.  Supraventricular and ventricular ectopic beat frequency are not reported on this monitoring modality.        8/28/2023 TTE  Left ventricular size, wall motion and function are normal. The ejection  fraction is 60-65%.  Normal right ventricle size and systolic function.  There is mild (1+) aortic regurgitation.  There is mild (1+) mitral regurgitation.       Cc: Dov Sky MD, Bryan Way MD Amila Dilusha William, MD  10/2/2023  3:02 PM        Thank you for allowing me to participate in the care of your patient.      Sincerely,     Sina Drew MD     [facility]  cc:   No referring provider defined for this encounter.

## 2023-10-02 NOTE — PATIENT INSTRUCTIONS
[facility]  Cardiac Electrophysiology  [address]  [place] [address]   Office: [phone]  Fax: [phone]       Thank you for seeing us in clinic today - it is a pleasure to be a part of your care team.  Below is a summary of our plan from today's visit.      You have had supraventricular tachycardia.  We reviewed SVT mechanisms and reviewed treatment options including electrophysiology study and ablation vs continued medical therapy.  We reviewed the nature of EP studies and ablation for SVT, success rates depending on inducibility and specific SVT mechanism, procedural risks and recovery expectations.  We will plan for the following:  - consider options, and let us know with questions or decision as to how you would like to proceed  - continue diltiazem ER 240mg daily for now - let us know if your swelling worsens    Please do not hesitate to be in touch with our office at [phone] with any questions that may arise.      Thank you for trusting us with your care,    Sina Drew MD  Clinical Cardiac Electrophysiology  [facility]  [address]  [place] [address]   Office: [phone]  Fax: [phone]

## 2023-10-02 NOTE — PROGRESS NOTES
[facility]  Cardiac Electrophysiology  [address]  [place] [address]   Office: [phone]  Fax: [phone]     Cardiac Electrophysiology Consultation    Patient: Kim Valdes   : 1935     Referring Provider: Dov Sky MD  Primary Care Provider: Bryan Way MD    CHIEF COMPLAINT/REASON FOR VISIT  Supraventricular tachycardia    Assessment/Recommendations   Kim Valdes is a 88 year old female with supraventricular tachycardia, HTN referred by Dr. Sky for consultation regarding SVT.    Supraventricular tachycardia - symptomatic with palpitations.  Likely AVNRT, though AVRT and AT are possible.  We reviewed SVT mechanisms and reviewed treatment options including electrophysiology study and ablation vs continued medical therapy.  We reviewed the nature of EP studies and ablation for SVT, success rates depending on inducibility and specific SVT mechanism, procedural risks (including groin hematoma, tamponade, heart block, stroke) and recovery expectations.  - she will take some time to consider options, and will be in touch with any questions or decision as to how she would like to proceed  - continue diltiazem ER 240mg daily.  She will contact us if her lower extremity swelling worsens.    Follow up: as above         History of Present Illness   Kim Valdes is a 88 year old female with supraventricular tachycardia, HTN referred by Dr. Sky for consultation regarding SVT.    Mrs. Valdes notes episodes of since since around .  She notes episodes occurring unpredictably, but often activity, and lasting usually between 15-30min, and usually resolve with resting and drinking water.  She underwent ER visit 2023 with SVT with note of spontaneous conversion to SR.  She had been on diltiazem ER - the dose was increased to 240mg daily after her ER visit.  She has noted increased lower extremity swelling since the dose increase.    She  "denies chest pain, syncope.  She is seen today with her  who has dementia, and her granddaughter.       Physical Examination  Review of Systems   VITALS: BP (!) 156/50 (BP Location: Right arm, Patient Position: Sitting, Cuff Size: Adult Regular)   Pulse 63   Resp 16   Ht 1.676 m (5' 6\")   Wt 54.4 kg (120 lb)   SpO2 98%   BMI 19.37 kg/m    Wt Readings from Last 3 Encounters:   09/19/23 54 kg (119 lb)   08/22/23 54 kg (119 lb)   08/21/23 54 kg (119 lb)     CONSTITUTIONAL: well nourished, comfortable, no distress  EYES:  Conjunctivae pink, sclerae clear.    E/N/T:  Oral mucosa pink  RESPIRATORY:  Respiratory effort is normal  CARDIOVASCULAR:  normal S1 and S2  GASTROINTESTINAL:  Abdomen without masses or tenderness  EXTREMITIES:  No clubbing or cyanosis.    MUSCULOSKELETAL:  Overall grossly normal muscle strength  SKIN:  Overall, skin warm and dry, no lesions.  NEURO/PSYCH:  Oriented x 3 with normal affect.   Constitutional:  No weight loss or loss of appetite    Eyes:  No difficulty with vision, no double vision, no dry eyes  ENT:  No sore throat, difficulty swallowing; changes in hearing or tinnitus  Cardiovascular: As detailed above  Respiratory:  No cough  Musculoskeletal  No joint pain, muscle aches  Neurologic:  No syncope, lightheadedness, fainting spells   Hematologic: No easy bruising, excessive bleeding tendency   Gastrointestinal:  No jaundice, abdominal pain or abdominal bloating  Genitourinary: No changes in urinary habits, no trouble urinating    Psychiatric: No anxiety or depression      Medical History  Surgical History   Past Medical History:   Diagnosis Date    Cholecystitis, unspecified     Created by Conversion     Past Surgical History:   Procedure Laterality Date    APPENDECTOMY  age 13    LAPAROSCOPIC CHOLECYSTECTOMY  mid 60's    OOPHORECTOMY Left age 61         Family History Social History   Family History   Problem Relation Age of Onset    Coronary Artery Disease Father     " Cerebrovascular Disease Mother         Social History     Tobacco Use    Smoking status: Never    Smokeless tobacco: Never   Vaping Use    Vaping Use: Never used         Medications  Allergies     Current Outpatient Medications:     aspirin 81 MG EC tablet, [ASPIRIN 81 MG EC TABLET] Take 81 mg by mouth every evening., Disp: , Rfl:     atorvastatin (LIPITOR) 10 MG tablet, Take 1 tablet (10 mg) by mouth At Bedtime, Disp: 90 tablet, Rfl: 3    diltiazem ER COATED BEADS (CARDIZEM CD/CARTIA XT) 240 MG 24 hr capsule, Take 1 capsule (240 mg) by mouth daily, Disp: 60 capsule, Rfl: 3    losartan (COZAAR) 50 MG tablet, Take 1 tablet (50 mg) by mouth daily, Disp: 90 tablet, Rfl: 3    mometasone (ELOCON) 0.1 % ointment, [MOMETASONE (ELOCON) 0.1 % OINTMENT] Apply thin layer QD to affected area when symptoms present, once a week for maintenance, Disp: , Rfl:      Allergies   Allergen Reactions    Omeprazole Unknown          Lab Results    Chemistry CBC Cardiac Enzymes/BNP/TSH/INR   Recent Labs   Lab Test 08/21/23  1324      POTASSIUM 4.7   CHLORIDE 104   CO2 26   *   BUN 32*   CR 1.52*   GFRESTIMATED 33*   ROSENDA 9.5     Recent Labs   Lab Test 08/21/23  1324 11/08/22  1140 10/26/21  1252   CR 1.52* 1.19* 1.11*          Recent Labs   Lab Test 08/21/23  1324   WBC 6.7   HGB 13.4   HCT 39.8   MCV 97        Recent Labs   Lab Test 08/21/23  1324 11/08/22  1140 10/26/21  1252   HGB 13.4 13.9 14.1    Recent Labs   Lab Test 08/21/23  1324   TROPONINI 0.02     No results for input(s): BNP, NTBNPI, NTBNP in the last 60706 hours.  Recent Labs   Lab Test 08/21/23  1324   TSH 1.60     No results for input(s): INR in the last 63131 hours.      Data Review    ECGs (tracings independently reviewed)  8/21/2023 - SR 62bpm, MA 194ms  8/21/2023 - short RP NCT 129bpm    Cardiac event monitoring from 11/28/2022 to 12/27/2022 (monitored duration 27d 5h 23m) (independently reviewed)  Baseline rhythm was sinus rhythm with PACs 65bpm.     Reported heart rate range 50 to 164bpm, average 67bpm.  7 symptom triggers (palpitations) correlated to sinus rhythm with first degree AV block, PACs, nonsustained supraventricular tachycardia.  Automated recordings included episodes of apparently sustained regular supraventricular tachycardia (144-164bpm, at least 4 episodes, 12/6/2022, two on 12/12/2022, 12/16/2022), nonsustained supraventricular tachycardia, 1st degree AV block, PACs, PVCs.  No atrial fibrillation.  There were no pauses noted.  SVT episodes (duration, onset, offset) are not well characterized on this monitoring modality.  Supraventricular and ventricular ectopic beat frequency are not reported on this monitoring modality.        8/28/2023 TTE  Left ventricular size, wall motion and function are normal. The ejection  fraction is 60-65%.  Normal right ventricle size and systolic function.  There is mild (1+) aortic regurgitation.  There is mild (1+) mitral regurgitation.       Cc: Dov Sky MD, Bryan Way MD Amila Dilusha William, MD  10/2/2023  3:02 PM

## 2023-10-09 NOTE — TELEPHONE ENCOUNTER
[facility] Call Center    Phone Message    May a detailed message be left on voicemail: yes     Reason for Call: Other: PT would like to get the Ablation scheduled.  Please call her to get that done.  Also pt doesn't know if the appt w/Dr. Drew on 10.2.23 is considered her cardio clearance or if she has to make another appt for that.  Please advise accordingly      Action Taken: Message routed to:  [facility] ([facility] cardio    Travel Screening: Not Applicable    Thank you!  [facility]

## 2023-10-09 NOTE — TELEPHONE ENCOUNTER
Called pt back and she confirmed she would like to move forward with an ablation.  Sent to Dr. Drew.    Katy Alejandre RN

## 2023-10-26 NOTE — TELEPHONE ENCOUNTER
Adrián Burns,    Thanks for the message, and sorry for the late reply.  Can plan for EPS/ablation for SVT, MAC, hold diltiazem 7 days prior.    Thank you!  Katy Rodriguez RN William, Amila Dilusha, MD2 weeks ago     TEGAN Drew,    Pt called today and she would like to move forward with an SVT ablation.  If OK can you please send your recommendations and we will get this prepped.    Thank you!  Katy Alejandre RN

## 2023-10-26 NOTE — PROGRESS NOTES
H&P  PMD: []  Received [] Card OV: [x]  Date: 10-2 Teach  []   Orders  I [x] P  [x]  Letter []   AC: None- NA AAD: Diltiazem-Hold 7 days  Diuretics: None  DM Meds: None  GLP-1:None     1935  Home:[phone] (home) Cell:[phone] (mobile)  Emergency Contact: Riccardo Valdesry [phone]  PCP: Bryan Way, [phone]    Important patient information for [facility]/Cath Lab staff : None    [facility] EP Cath Lab Procedure Order   Ablation Type:Supraventricular Tachycardia  Ordering Provider: Dr Rajendra Hernandez Ordered and Prepped: 10/26/2023 Annika Barrera RN  Anticipated Case Duration:  Standard ( Case per day SA 2:1, DW 4:1, KA 3:1)   Scheduling Timeframe:  Next Available  Scheduling Restrictions: None  Scheduling Contact: Please contact pt to schedule, if you are unable to schedule date within the next 24 hours please contact pt to update on scheduling process  EP RN Follow Up Apt: Dr Hein or Dr Drew Ablations, do NOT need RN PC follow-up post procedure. Dr Ross's PVC/VT ablations need 3-4 day EP RN PC post procedure.  Cardiology Follow Up Apt s/p: AVN-General Card @ 6mo + if EF <50 or dx of HF HF ZOTLAN at time of device PO or if EF >50% no dx of HF EP ZOLTAN at time of device PO, SVT/AFL- EP ZOLTAN @ 6 wks and General Card @ 6mo, VT- EP MD @ 6 wks, PVC- Dr Drew 1 wk MCTO/Zio @ 4 wks and Dr Ross 24 hr Holter @ 4 wks with EP MD @ wks for both  Current Device/Device Co Needed for Procedure: None NoneNone  Pre-Procedural Testing needed: None  Mapping System Required:  Carto (Cody Drew)  ICE Needed:  No  Anesthesia:  MAC- Monitored Anesthesia Care    [facility] EP Cath Lab Prep   H&P:  Compled by cardiology on 10-2-23 if scheduled within 30 days, pt to schedule with PMD if procedure outside of this timeframe  Pre-Procedure Labs/T&S: For VT & PVC Ablations only schedule lab visit at [facility] lab within 3 days prior to procedure for T&S, BMP, CBC, HcG is appropriate, and INR if on warfarin. All other ablations  pre-procedure lab work will be done the morning of the procedure.  Medical Records Pertinent for Procedure:  Holter/ACT Event monitor 11-28-22, Echo EF 60-65% 8-28-23, and EKG 8-21-23 SVT @129  Contrast Dye Allergies: None  GLP-1 Protocol: If on Dulaglutide (Trulicity) (weekly)- Injection hold 7 days prior to procedure  , Exenatide extended release (Bydureon bcise) (weekly)- Injection hold 7 days prior to procedure, Exenatide (Byetta) (twice daily)- Oral Tablet hold day prior and morning of procedure and for Injection hold 7 days prior to procedure, Semaglutide (Ozempic) (weekly)- Injection and Oral hold 7 days prior to procedure, Liraglutide (Victoza, Saxenda) (daily)- Injection hold day prior and morning of procedure    Allergies   Allergen Reactions    Omeprazole Unknown       Current Outpatient Medications:     aspirin 81 MG EC tablet, [ASPIRIN 81 MG EC TABLET] Take 81 mg by mouth every evening., Disp: , Rfl:     atorvastatin (LIPITOR) 10 MG tablet, Take 1 tablet (10 mg) by mouth At Bedtime, Disp: 90 tablet, Rfl: 3    diltiazem ER COATED BEADS (CARDIZEM CD/CARTIA XT) 240 MG 24 hr capsule, Take 1 capsule (240 mg) by mouth daily, Disp: 60 capsule, Rfl: 3    losartan (COZAAR) 50 MG tablet, Take 1 tablet (50 mg) by mouth daily, Disp: 90 tablet, Rfl: 3    mometasone (ELOCON) 0.1 % ointment, [MOMETASONE (ELOCON) 0.1 % OINTMENT] Apply thin layer QD to affected area when symptoms present, once a week for maintenance, Disp: , Rfl:     Documentation Date:10/26/2023 8:40 AM  Annika Barrera RN

## 2023-11-08 NOTE — PROGRESS NOTES
"SUBJECTIVE:   Kim is a 88 year old who presents for Preventive Visit.      11/8/2023    10:12 AM   Additional Questions   Roomed by Sari       Are you in the first 12 months of your Medicare coverage?  No    Healthy Habits:     In general, how would you rate your overall health?  Good    Frequency of exercise:  2-3 days/week    Duration of exercise:  15-30 minutes    Do you usually eat at least 4 servings of fruit and vegetables a day, include whole grains    & fiber and avoid regularly eating high fat or \"junk\" foods?  No    Medication side effects:  Other    Ability to successfully perform activities of daily living:  No assistance needed    Home Safety:  No safety concerns identified    Hearing Impairment:  Feel that people are mumbling or not speaking clearly and difficulty understanding soft or whispered speech    In the past 6 months, have you been bothered by leaking of urine?  No    In general, how would you rate your overall mental or emotional health?  Good      Today's PHQ-2 Score:       11/7/2023     3:07 PM   PHQ-2 ( 1999 Pfizer)   Q1: Little interest or pleasure in doing things 0   Q2: Feeling down, depressed or hopeless 0   PHQ-2 Score 0   Q1: Little interest or pleasure in doing things Not at all   Q2: Feeling down, depressed or hopeless Not at all   PHQ-2 Score 0           Have you ever done Advance Care Planning? (For example, a Health Directive, POLST, or a discussion with a medical provider or your loved ones about your wishes): Yes, advance care planning is on file.       Fall risk  Fallen 2 or more times in the past year?: No  Any fall with injury in the past year?: No    Cognitive Screening   1) Repeat 3 items (Leader, Season, Table)    2) Clock draw: NORMAL  3) 3 item recall: Recalls 3 objects  Results: 3 items recalled: COGNITIVE IMPAIRMENT LESS LIKELY    Mini-CogTM Copyright S Bonifacio. Licensed by the author for use in [employer] [facility]; reprinted with permission (zeynep@.edu). All " rights reserved.      Do you have sleep apnea, excessive snoring or daytime drowsiness? : no    Reviewed and updated as needed this visit by clinical staff   Tobacco  Allergies  Meds              Reviewed and updated as needed this visit by Provider                 Social History     Tobacco Use    Smoking status: Never     Passive exposure: Never    Smokeless tobacco: Never   Substance Use Topics    Alcohol use: Not on file           11/7/2023     3:04 PM   Alcohol Use   Prescreen: >3 drinks/day or >7 drinks/week? No     Do you have a current opioid prescription? No  Do you use any other controlled substances or medications that are not prescribed by a provider? None    Current providers sharing in care for this patient include:   Patient Care Team:  Bryan Way MD as PCP - General (Internal Medicine)  Bryan Way MD as Assigned PCP  Sina Drew MD as Assigned Heart and Vascular Provider    The following health maintenance items are reviewed in Epic and correct as of today:  Health Maintenance   Topic Date Due    ANNUAL REVIEW OF HM ORDERS  Never done    ZOSTER IMMUNIZATION (1 of 2) Never done    RSV VACCINE (Pregnancy & 60+) (1 - 1-dose 60+ series) Never done    INFLUENZA VACCINE (1) 09/01/2023    COVID-19 Vaccine (6 - 2023-24 season) 09/01/2023    MEDICARE ANNUAL WELLNESS VISIT  11/08/2023    FALL RISK ASSESSMENT  11/08/2024    ADVANCE CARE PLANNING  11/08/2027    DTAP/TDAP/TD IMMUNIZATION (3 - Td or Tdap) 07/11/2030    PHQ-2 (once per calendar year)  Completed    Pneumococcal Vaccine: 65+ Years  Completed    IPV IMMUNIZATION  Aged Out    HPV IMMUNIZATION  Aged Out    MENINGITIS IMMUNIZATION  Aged Out    RSV MONOCLONAL ANTIBODY  Aged Out       Pertinent mammograms are reviewed under the imaging tab.    Review of Systems   Constitutional:  Negative for chills and fever.   HENT:  Positive for hearing loss. Negative for congestion, ear pain and sore throat.    Eyes:  Negative for pain  "and visual disturbance.   Respiratory:  Negative for shortness of breath.    Cardiovascular:  Positive for peripheral edema. Negative for chest pain and palpitations.   Gastrointestinal:  Negative for abdominal pain, constipation, diarrhea, heartburn, hematochezia and nausea.   Breasts:  Negative for tenderness, breast mass and discharge.   Genitourinary:  Negative for dysuria, frequency, genital sores, hematuria, pelvic pain, urgency and vaginal discharge.   Musculoskeletal:  Positive for arthralgias. Negative for joint swelling and myalgias.   Skin:  Negative for rash.   Neurological:  Negative for dizziness, weakness, headaches and paresthesias.   Psychiatric/Behavioral:  Negative for mood changes.          OBJECTIVE:   /62 (BP Location: Right arm, Patient Position: Sitting, Cuff Size: Adult Regular)   Pulse 74   Temp 97.7  F (36.5  C)   Resp 16   Ht 1.676 m (5' 6\")   Wt 55.8 kg (123 lb)   LMP  (LMP Unknown)   SpO2 97%   BMI 19.85 kg/m   Estimated body mass index is 19.85 kg/m  as calculated from the following:    Height as of this encounter: 1.676 m (5' 6\").    Weight as of this encounter: 55.8 kg (123 lb).  Physical Exam    General: Alert, in no distress  Skin: No significant lesion seen.  Eyes/nose/throat: Eyes without scleral icterus, eye movements normal, pupils equal and reactive, oropharynx clear  + Bilateral hearing aids, moderate amount of wax both tympanic canals, for which I recommended that she use over-the-counter wax dissolving eardrops, example brand Debrox or Murine, do this every week to help keep the ears clear of wax  MSK: Neck with good ROM  Lymphatic: Neck without adenopathy or masses  Endocrine: Thyroid with no nodules to palpation  Pulm: Lungs clear to auscultation bilaterally  Cardiac: Heart with regular rate and rhythm, no murmur or gallop  GI: Abdomen soft, nontender. No palpable enlargement of liver or spleen  MSK: Extremities no tenderness   + Leg edema 1+ bilateral, " centered around the ankles, which I am attributing to the high-dose diltiazem that she takes for SVT rate control  Neuro: Moves all extremities, without focal weakness  Psych: Alert, normal mental status. Normal affect and speech      ASSESSMENT / PLAN:     Annual wellness visit, Ms. Valdes comes to clinic today accompanied by her  Lon, overall they are doing pretty well.  They continue to live at [facility], and generally enjoying it.    Ms. Valdes is working with cardiology electrophysiology Dr. Drew, regarding the SVT.  She told me that since her visit with Dr. Drew October 2, over the past month she has had maybe 2 or 3 episodes of palpitations, which lasted maybe about 5 minutes each time.  During the episodes, she said she felt generally okay, noticed fluttering, but not short of breath, no pain, no sensation that she might faint.  She would sit down, have a glass of water, and would be just fine a few minutes later.    She continues on the diltiazem  mg a day.  On that dose, she does have some calcium channel side effect of bilateral ankle edema.  I told Ms Valdes that that is a trade off of needing to take the diltiazem to rate control the SVT.  I do not want to put her on a diuretic, because of concern that that could cause electrolyte and volume depletion that could actually set her up for more rhythm problems.  For the time being, she is willing to accept the trade off of ankle edema, which I would grade is about 1+ bilateral.    She and Dr. Drew have talked about doing an elective ablation of the SVT focus.  Since Ms. Valdes has been feeling pretty good over the last month, and is not particularly bothered by rhythm disturbance, I told her that there is does not seem to be any rush to get an EP procedure done.  She will let me know how things are going, and if symptoms become more bothersome, we can move things forward    Kim comes to today's visit accompanied by her   Lon.  They sold their home in [place] and moved into senior living at [facility].  They will be in a 2 bedroom apartment, which includes a solarium.  Kim still drives.    We will have her stop by the laboratory this morning for comprehensive metabolic panel, blood cell counts, but skip the lipid panel since she did eat some breakfast this morning.  Thyroid blood test was run in August 2023 and was normal    We will also administer her seasonal flu shot and updated 2023/4 COVID-19 vaccine     Supraventricular tachycardia d  Emergency department visit yesterday August 21, 2023 because of tachycardia  More than 2 hours of supraventricular tachycardia documented August 21, 2023 8-, 13:09  Accelerated Junctional rhythm with retrograde conduction  Nonspecific ST abnormality  Abnormal ECG  When compared with ECG of 26-OCT-2021 12:23,  Junctional rhythm has replaced Sinus rhythm  Vent. rate has increased BY  66 BPM  ST now depressed in Inferior leads  ST now depressed in Lateral leads  Confirmed by SEE ED PROVIDER NOTE FOR, ECG INTERPRETATION ([id number]),  GERALDINE HUSTON ([id number]) on 8/21/2023 3:12:33 PM     8-, 14:22  Sinus rhythm  Normal ECG  When compared with ECG of 21-AUG-2023 13:09,  Sinus rhythm has replaced Junctional rhythm  Vent. rate has decreased BY  67 BPM  ST no longer depressed in Inferior leads  ST no longer depressed in Lateral leads  Confirmed by SEE ED PROVIDER NOTE FOR, ECG INTERPRETATION ([id number]), GERALDINE Barth ([id number]) on 8/21/2023 3:11:35 PM     Cardiac event monitoring from 11/28/2022 to 12/27/2022 (monitored duration 27d 5h 23m).  Baseline rhythm was sinus rhythm with PACs 65bpm.    Reported heart rate range 50 to 164bpm, average 67bpm.  7 symptom triggers (palpitations) correlated to sinus rhythm with first degree AV block, PACs, nonsustained supraventricular tachycardia.  Automated recordings included episodes of apparently sustained regular  supraventricular tachycardia (144-164bpm, at least 4 episodes, 12/6/2022, two on 12/12/2022, 12/16/2022), nonsustained supraventricular tachycardia, 1st degree AV block, PACs, PVCs.  No atrial fibrillation.  There were no pauses noted.  SVT episodes (duration, onset, offset) are not well characterized on this monitoring modality.  Supraventricular and ventricular ectopic beat frequency are not reported on this monitoring modality.       Systolic ejection murmur best heard right upper sternal border, which is aortic sclerosis  8- Echo  Left ventricular size, wall motion and function are normal. The ejection  fraction is 60-65%.  Normal right ventricle size and systolic function.  There is mild (1+) aortic regurgitation.  There is mild (1+) mitral regurgitation.    Leg edema 1+ bilateral, centered around the ankles, which I am attributing to the high-dose diltiazem that she takes for SVT rate control     Hypertension with elevated systolic component  As of August 22, 2023, stopping the hydrochlorothiazide component, but keeping her on losartan 50 mg a day     Diltiazem extended release dose increasing from 120 to 180 mg a day as of August 22, 2023, in order to better suppress SVT     Prerenal azotemia --which was the reason for stopping the hydrochlorothiazide  8-  Creatinine 0.60 - 1.10 mg/dL 1.52 High       Urea Nitrogen 8 - 28 mg/dL 32 High       Weight has stabilized after a few pounds of loss, that was triggered by the stress of selling their house and moving in the [facility]      Wt Readings from Last 5 Encounters:   11/08/23 55.8 kg (123 lb)   10/02/23 54.4 kg (120 lb)   09/19/23 54 kg (119 lb)   08/22/23 54 kg (119 lb)   08/21/23 54 kg (119 lb)     She does the cooking in their new apartment, and they take 1 meal per week offered by [facility]  I told her to not lose anymore weight, I like to see her add back maybe 10 pounds.     [facility] offers exercise classes, but she is hesitant  to go to them because she wants to stay close to Lon, who needs frequent monitoring.      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  EXAM: MR LUMBAR SPINE WO CONTRAST  LOCATION: [facility]  DATE/TIME: 2/11/2020 2:24 PM     IMPRESSION:   1.  Multilevel degenerative disc disease of the lumbar spine with symmetric disc bulges, most pronounced at the L4/L5 level where there is severe spinal canal and bilateral lateral recess narrowing (AP diameter the spinal canal measuring 3 mm).  2.  Advanced bilateral facet arthropathy and bilateral facet joint effusions at L4/L5. Flexion and extension views could be performed to evaluate for dynamic instability.  3.  Multilevel posterolateral disc disease and facet arthropathy with multilevel neural foraminal narrowing, most pronounced at the L4/L5 level where there is moderate to severe right and moderate left neural foraminal narrowing.     Continue with a conservative management strategy of exercise and maximizing mobility.     Cervical spondylosis, with central canal stenosis at C6-C7 level, with mild compression of the cervical spinal cord, but no evidence of myelomalacia by MRI March 5, 2020  EXAM: MR CERVICAL SPINE WO CONTRAST  LOCATION: [facility]  DATE/TIME: 3/17/2020 1:46 PM  1.  Multilevel degenerative disc disease of the cervical spine with disc osteophyte complexes, most pronounced  at the C5/C6 level where there is moderate to severe spinal canal narrowing and compression of the cervical spinal cord and at the C6/C7 level where there is moderate spinal canal narrowing and mild compression of the cervical spinal cord. No definite evidence of edema or myelomalacia at these levels.  2.  Multilevel uncovertebral joint disease and facet arthropathy with severe multilevel neural foraminal narrowing at C3/C4-C6/C7 as described.      Left shoulder pain-- doing better-- after a fall December 2021  Slipped on black ice December 26, 2021, sustaining a contusion of her left upper arm, possibly injuring her shoulder.  As of November 8, 2022, she recovered good range of motion of the left shoulder, able to raise her arms all the way up over her head.    History of benign monoclonal gammopathy.    Serum protein electrophoresis from specimen drawn October 23, 2019 showed no monoclonal protein.  I think we can stop checking.     Hyperlipidemia on treatment  Continue on atorvastatin 10 milligrams a day       Latest Reference Range & Units 11/08/22 11:40   Cholesterol <200 mg/dL 161   Glucose 70 - 99 mg/dL 108 (H)   HDL Cholesterol >=50 mg/dL 74   LDL Cholesterol Calculated <=100 mg/dL 69   Non HDL Cholesterol <130 mg/dL 87   Triglycerides <150 mg/dL 89      Menopause with history of previous left oophrectomy.  Uterus is still in place.    Latest Reference Range & Units 11/08/22 11:40   TSH 0.30 - 4.20 uIU/mL 2.07      Probably has at least osteopenia, probably osteoporosis, and I would like her to continue taking calcium and vitamin D supplement.  The amount of calcium is 1000 mg of elemental calcium per day.  The form of calcium can be calcium carbonate (for example Tums) or calcium citrate (Citracal or Caltrate)  The amount of the element calcium is reported on the label of these products.  Also over-the-counter vitamin D 2000 units a day.     History of adenomatous colon polyp.  Last colonoscopy was October  2015, and she has stopped colon screening.  The October 2015 colonoscopy showed diverticulosis, no polyps.  She can stop colon screening at this point.    Bilateral hearing aids, moderate amount of wax both tympanic canals, for which I recommended that she use over-the-counter wax dissolving eardrops, example brand Debrox or Murine, do this every week to help keep the ears clear of wax     Vulvar lichen sclerosus, for which she uses topical steroid ointment with good effect.  She has received Prevnar 13 and pneumococcal 23 polysaccharide already.    Immunization History   Administered Date(s) Administered    COVID-19 Bivalent 12+ (Pfizer) 11/08/2022    COVID-19 MONOVALENT 12+ (Pfizer) 02/04/2021, 02/25/2021, 10/26/2021    COVID-19 Monovalent 12+ (Pfizer 2022) 04/27/2022    Flu, Unspecified 11/06/2007, 10/31/2008, 11/25/2009, 10/20/2010, 11/18/2011    Influenza (High Dose) 3 valent vaccine 10/19/2015, 10/03/2016, 10/17/2017, 10/02/2018, 10/23/2019, 09/23/2021    Influenza Vaccine 65+ (Fluzone HD) 10/06/2020, 11/08/2022    Influenza Vaccine, 6+MO IM (QUADRIVALENT W/PRESERVATIVES) 11/14/2012, 11/15/2013, 10/10/2014    Pneumo Conj 13-V (2010&after) 04/22/2016    Pneumococcal 23 valent 10/30/2002, 12/28/2007    TDAP (Adacel,Boostrix) 12/01/2009, 07/11/2020    Td,adult,historic,unspecified 12/01/2009       COUNSELING:  Reviewed preventive health counseling, as reflected in patient instructions       Regular exercise       Healthy diet/nutrition        She reports that she has never smoked. She has never been exposed to tobacco smoke. She has never used smokeless tobacco.      Appropriate preventive services were discussed with this patient, including applicable screening as appropriate for fall prevention, nutrition, physical activity, Tobacco-use cessation, weight loss and cognition.  Checklist reviewing preventive services available has been given to the patient.    Reviewed patients plan of care and provided an AVS.  The Basic Care Plan (routine screening as documented in Health Maintenance) for Kim meets the Care Plan requirement. This Care Plan has been established and reviewed with the Patient.      GANGA FRANCIS MD  [facility]    Identified Health Risks:  I have reviewed Opioid Use Disorder and Substance Use Disorder risk factors and made any needed referrals.

## 2023-11-08 NOTE — PATIENT INSTRUCTIONS
Annual wellness visit, Ms. Valdes comes to clinic today accompanied by her  Lon, overall they are doing pretty well.  They continue to live at [facility], and generally enjoying it.    Ms. Valdes is working with cardiology electrophysiology Dr. Drew, regarding the SVT.  She told me that since her visit with Dr. Drew October 2, over the past month she has had maybe 2 or 3 episodes of palpitations, which lasted maybe about 5 minutes each time.  During the episodes, she said she felt generally okay, noticed fluttering, but not short of breath, no pain, no sensation that she might faint.  She would sit down, have a glass of water, and would be just fine a few minutes later.    She continues on the diltiazem  mg a day.  On that dose, she does have some calcium channel side effect of bilateral ankle edema.  I told Ms Valdes that that is a trade off of needing to take the diltiazem to rate control the SVT.  I do not want to put her on a diuretic, because of concern that that could cause electrolyte and volume depletion that could actually set her up for more rhythm problems.  For the time being, she is willing to accept the trade off of ankle edema, which I would grade is about 1+ bilateral.    She and Dr. Drew have talked about doing an elective ablation of the SVT focus.  Since Ms. Valdes has been feeling pretty good over the last month, and is not particularly bothered by rhythm disturbance, I told her that there is does not seem to be any rush to get an EP procedure done.  She will let me know how things are going, and if symptoms become more bothersome, we can move things forward    Kim comes to today's visit accompanied by her  Lon.  They sold their home in [place] and moved into senior living at [facility].  They will be in a 2 bedroom apartment, which includes a solarium.  Kim still drives.    We will have her stop by the laboratory this morning for  comprehensive metabolic panel, blood cell counts, but skip the lipid panel since she did eat some breakfast this morning.  Thyroid blood test was run in August 2023 and was normal    We will also administer her seasonal flu shot and updated 2023/4 COVID-19 vaccine     Supraventricular tachycardia d  Emergency department visit yesterday August 21, 2023 because of tachycardia  More than 2 hours of supraventricular tachycardia documented August 21, 2023 8-, 13:09  Accelerated Junctional rhythm with retrograde conduction  Nonspecific ST abnormality  Abnormal ECG  When compared with ECG of 26-OCT-2021 12:23,  Junctional rhythm has replaced Sinus rhythm  Vent. rate has increased BY  66 BPM  ST now depressed in Inferior leads  ST now depressed in Lateral leads  Confirmed by SEE ED PROVIDER NOTE FOR, ECG INTERPRETATION ([id number]),  GERALDINE HUSTON ([id number]) on 8/21/2023 3:12:33 PM     8-, 14:22  Sinus rhythm  Normal ECG  When compared with ECG of 21-AUG-2023 13:09,  Sinus rhythm has replaced Junctional rhythm  Vent. rate has decreased BY  67 BPM  ST no longer depressed in Inferior leads  ST no longer depressed in Lateral leads  Confirmed by SEE ED PROVIDER NOTE FOR, ECG INTERPRETATION ([id number]),  GERALDINE HUSTON ([id number]) on 8/21/2023 3:11:35 PM     Cardiac event monitoring from 11/28/2022 to 12/27/2022 (monitored duration 27d 5h 23m).  Baseline rhythm was sinus rhythm with PACs 65bpm.    Reported heart rate range 50 to 164bpm, average 67bpm.  7 symptom triggers (palpitations) correlated to sinus rhythm with first degree AV block, PACs, nonsustained supraventricular tachycardia.  Automated recordings included episodes of apparently sustained regular supraventricular tachycardia (144-164bpm, at least 4 episodes, 12/6/2022, two on 12/12/2022, 12/16/2022), nonsustained supraventricular tachycardia, 1st degree AV block, PACs, PVCs.  No atrial fibrillation.  There were no pauses noted.  SVT episodes  (duration, onset, offset) are not well characterized on this monitoring modality.  Supraventricular and ventricular ectopic beat frequency are not reported on this monitoring modality.       Systolic ejection murmur best heard right upper sternal border, which is aortic sclerosis  8- Echo  Left ventricular size, wall motion and function are normal. The ejection  fraction is 60-65%.  Normal right ventricle size and systolic function.  There is mild (1+) aortic regurgitation.  There is mild (1+) mitral regurgitation.    Leg edema 1+ bilateral, centered around the ankles, which I am attributing to the high-dose diltiazem that she takes for SVT rate control     Hypertension with elevated systolic component  As of August 22, 2023, stopping the hydrochlorothiazide component, but keeping her on losartan 50 mg a day     Diltiazem extended release dose increasing from 120 to 180 mg a day as of August 22, 2023, in order to better suppress SVT     Prerenal azotemia --which was the reason for stopping the hydrochlorothiazide  8-  Creatinine 0.60 - 1.10 mg/dL 1.52 High       Urea Nitrogen 8 - 28 mg/dL 32 High       Weight has stabilized after a few pounds of loss, that was triggered by the stress of selling their house and moving in the [facility]      Wt Readings from Last 5 Encounters:   11/08/23 55.8 kg (123 lb)   10/02/23 54.4 kg (120 lb)   09/19/23 54 kg (119 lb)   08/22/23 54 kg (119 lb)   08/21/23 54 kg (119 lb)     She does the cooking in their new apartment, and they take 1 meal per week offered by [facility]  I told her to not lose anymore weight, I like to see her add back maybe 10 pounds.     [facility] offers exercise classes, but she is hesitant to go to them because she wants to stay close to Lon, who needs frequent monitoring.      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  EXAM: MR LUMBAR SPINE WO CONTRAST  LOCATION: [facility]  DATE/TIME: 2/11/2020 2:24 PM     IMPRESSION:   1.  Multilevel degenerative disc disease of the lumbar spine with symmetric disc bulges, most pronounced at the L4/L5 level where there is severe spinal canal and bilateral lateral recess narrowing (AP diameter the spinal canal measuring 3 mm).  2.  Advanced bilateral facet arthropathy and bilateral facet joint effusions at L4/L5. Flexion and extension views could be performed to evaluate for dynamic instability.  3.  Multilevel posterolateral disc disease and facet arthropathy with multilevel neural foraminal narrowing, most pronounced at the L4/L5 level where there is moderate to severe right and moderate left neural foraminal narrowing.     Continue with a conservative management strategy of exercise and maximizing mobility.     Cervical spondylosis, with central canal stenosis at C6-C7 level, with mild compression of the cervical spinal cord, but no evidence of myelomalacia by MRI March 5, 2020  EXAM: MR CERVICAL SPINE WO CONTRAST  LOCATION: [facility]  DATE/TIME: 3/17/2020 1:46 PM  1.  Multilevel degenerative disc disease of the cervical spine with disc osteophyte complexes, most pronounced at the C5/C6 level where there is moderate to severe spinal canal narrowing and compression of the cervical spinal cord and at the C6/C7 level where there is moderate spinal canal narrowing and mild compression of the cervical spinal cord. No  definite evidence of edema or myelomalacia at these levels.  2.  Multilevel uncovertebral joint disease and facet arthropathy with severe multilevel neural foraminal narrowing at C3/C4-C6/C7 as described.      Left shoulder pain-- doing better-- after a fall December 2021  Slipped on black ice December 26, 2021, sustaining a contusion of her left upper arm, possibly injuring her shoulder.  As of November 8, 2022, she recovered good range of motion of the left shoulder, able to raise her arms all the way up over her head.    History of benign monoclonal gammopathy.    Serum protein electrophoresis from specimen drawn October 23, 2019 showed no monoclonal protein.  I think we can stop checking.     Hyperlipidemia on treatment  Continue on atorvastatin 10 milligrams a day       Latest Reference Range & Units 11/08/22 11:40   Cholesterol <200 mg/dL 161   Glucose 70 - 99 mg/dL 108 (H)   HDL Cholesterol >=50 mg/dL 74   LDL Cholesterol Calculated <=100 mg/dL 69   Non HDL Cholesterol <130 mg/dL 87   Triglycerides <150 mg/dL 89      Menopause with history of previous left oophrectomy.  Uterus is still in place.    Latest Reference Range & Units 11/08/22 11:40   TSH 0.30 - 4.20 uIU/mL 2.07      Probably has at least osteopenia, probably osteoporosis, and I would like her to continue taking calcium and vitamin D supplement.  The amount of calcium is 1000 mg of elemental calcium per day.  The form of calcium can be calcium carbonate (for example Tums) or calcium citrate (Citracal or Caltrate)  The amount of the element calcium is reported on the label of these products.  Also over-the-counter vitamin D 2000 units a day.     History of adenomatous colon polyp.  Last colonoscopy was October 2015, and she has stopped colon screening.  The October 2015 colonoscopy showed diverticulosis, no polyps.  She can stop colon screening at this point.    Bilateral hearing aids, moderate amount of wax both tympanic canals, for which I  recommended that she use over-the-counter wax dissolving eardrops, example brand Debrox or Murine, do this every week to help keep the ears clear of wax     Vulvar lichen sclerosus, for which she uses topical steroid ointment with good effect.  She has received Prevnar 13 and pneumococcal 23 polysaccharide already.    Immunization History   Administered Date(s) Administered    COVID-19 Bivalent 12+ (Pfizer) 11/08/2022    COVID-19 MONOVALENT 12+ (Pfizer) 02/04/2021, 02/25/2021, 10/26/2021    COVID-19 Monovalent 12+ (Pfizer 2022) 04/27/2022    Flu, Unspecified 11/06/2007, 10/31/2008, 11/25/2009, 10/20/2010, 11/18/2011    Influenza (High Dose) 3 valent vaccine 10/19/2015, 10/03/2016, 10/17/2017, 10/02/2018, 10/23/2019, 09/23/2021    Influenza Vaccine 65+ (Fluzone HD) 10/06/2020, 11/08/2022    Influenza Vaccine, 6+MO IM (QUADRIVALENT W/PRESERVATIVES) 11/14/2012, 11/15/2013, 10/10/2014    Pneumo Conj 13-V (2010&after) 04/22/2016    Pneumococcal 23 valent 10/30/2002, 12/28/2007    TDAP (Adacel,Boostrix) 12/01/2009, 07/11/2020    Td,adult,historic,unspecified 12/01/2009

## 2023-11-08 NOTE — Clinical Note
Dr. Drew, I am sitting here with Kim and she is doing just great.  Over the past month she recalls maybe 2 episodes of palpitations that lasted a few minutes each, otherwise felt fine.  She does not seem in any particular hurry to get an EP procedure done, so she told me that she will let you and me know when symptoms become more bothersome.  She does have some ankle edema from diltiazem, but seems willing to tolerate that. Lon Way

## 2023-11-13 NOTE — TELEPHONE ENCOUNTER
Noted.  Phone call to patient, she states she would like to hold off on SVT ablation for now.  She has noted some bilateral ankle edema on Diltazem.  She did see Dr. Way, her PMD who also noted swelling.      PT was seen in clinic with ADW on 10-2-23, for SVT.  Ablation was offered, swelling was noted.     Will review with Dr. Drew and call patient with recommendations.

## 2023-11-13 NOTE — TELEPHONE ENCOUNTER
[facility] Ep Support Pool - [facility]  Caller: Unspecified (2 weeks ago)  I called to schedule this patient and she stated that her primary had sent a note to ADW that she was going to hold off on the procedure at this time.    She stated that the medication is working for now but asked if someone could call her to discuss the medication and some side effects she's having with swelling.    -Ej

## 2023-11-15 NOTE — TELEPHONE ENCOUNTER
Noted.  Phone call to patient, reviewed recommendations below.  She states agreement and understanding.  Reviewed common side effects with Metoprolol, will send to her pharmacy.  She did take Diltiazem today and will not start Metoprolol until tomorrow

## 2023-11-15 NOTE — TELEPHONE ENCOUNTER
Sina Drew MD Johnson, Caroline, RN  Caller: Unspecified (2 days ago, 12:12 PM)  Adrián Roblero,    Aiden - can try stopping diltiazem and starting metoprolol XL 50mg daily instead.    Thanks,  Irish

## 2023-12-15 NOTE — TELEPHONE ENCOUNTER
I called and spoke with Romero and notified him that Dr. Way doesn't have any openings next week for a Hosp Follow up and if she wanted to see someone else she could otherwise she could make an appt to Dr. Way for a Hospital Follow up in January.  Enzo said that he will have Kim call back and schedule another appt.

## 2023-12-15 NOTE — TELEPHONE ENCOUNTER
Reason for Call:  Appointment Request    Patient requesting this type of appt:  Hospital/ED Follow-Up     Requested provider: Bryan Way    Reason patient unable to be scheduled: Not within requested timeframe    When does patient want to be seen/preferred time: 1-2 days    Comments: In hops for TIA and slight cardiac concern while in hosp     [facility] or would you prefer to receive a phone call?:   Patient would prefer a phone call   Okay to leave a detailed message?: Yes at Cell number on file:    Telephone Information:   Mobile [phone]       Call taken on 12/15/2023 at 1:40 PM by Zaikya Jones

## 2023-12-26 PROBLEM — R56.9 SEIZURE-LIKE ACTIVITY (H): Status: ACTIVE | Noted: 2023-01-01

## 2023-12-26 NOTE — PROGRESS NOTES
[facility]       Patient Kim Valdes  MRN: [id number]        Reason for Visit     Chief Complaint   Patient presents with    Hospital F/U       Code Status     CPR/Full code     Assessment/plan     Transient neurological impairment.  With repeat episode reported by the patient today.  Neurology was consulted.  Believe this is most likely seizure.  Extensive workup so far has been negative  Currently on Keppra 500 twice daily.  She does have an event monitor placed at discharge.    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    Bradycardia-  On a low-dose of Toprol to 12.5 daily for due to bradycardia concerns  MED held today due to bradycardia concerns  On DAPT with both Plavix and aspirin for 10 days as recommended by cardiology    Hypertension currently on losartan monitor blood pressures    Ckd-last cr 1.1  monitor    Hyperlipidemia she is currently on atorvastatin    Generalized weakness -has difficulty standing and maintaining her balance.  Currently using a walker  Continue with PT and OT  Daughter updated on the phone including her new episode which suspicious for seizure-like activity.  Daughter unfortunately has not connected her event monitor  Advised that she should do this and update given that we do not have telemetry monitoring in the TCU.  At this point discussion done with the daughter regarding concerns and updates provided will not increase her Keppra but would encourage her to see neurology if this remains an ongoing issue.  Daughter has noted more cognitive impairment stemming from these episodes which are all new  Care plan also reviewed with therapy will update me about any concerns relating to cognition as well as with any reproducible symptoms.  Time spent in this visit was 45 minutes including reviewing concerns with the patient and updating her daughter and reviewing concerns with therapy including  Her weakness and confusion      History     Patient is a very pleasant 88  year old female who is admitted to TCU  Patient was admitted to the hospital with increased confusion  She recently has an AVNRT which was ablated on 12/13/2023  Her metoprolol was reduced due to bradycardia  Unfortunately had another low heart rate of 51 and nursing elected to hold her beta-blockers  Unfortunately today had another episode where she felt her mom had moved to the right she is not sure of her head moved or she herself moved it  Patient is having some confusion related to that episode  Some weakness reported also    Past Medical & Surgical History     PAST MEDICAL HISTORY:   Past Medical History:   Diagnosis Date    Cholecystitis, unspecified     Created by Conversion       PAST SURGICAL HISTORY:   has a past surgical history that includes Oophorectomy (Left, age 61); Laparoscopic cholecystectomy (mid 60's); and appendectomy (age 13).      Past Social History     Reviewed,  reports that she has never smoked. She has never been exposed to tobacco smoke. She has never used smokeless tobacco.    Family History     Reviewed, and family history includes Cerebrovascular Disease in her mother; Coronary Artery Disease in her father.    Medication List     Current Outpatient Medications   Medication    acetaminophen (TYLENOL) 325 MG tablet    aspirin 81 MG EC tablet    atorvastatin (LIPITOR) 10 MG tablet    clopidogrel (PLAVIX) 75 MG tablet    levETIRAcetam (KEPPRA) 500 MG tablet    losartan (COZAAR) 50 MG tablet    metoprolol succinate ER (TOPROL XL) 25 MG 24 hr tablet    metoprolol succinate ER (TOPROL XL) 50 MG 24 hr tablet    mometasone (ELOCON) 0.1 % ointment     No current facility-administered medications for this visit.      MED REC REQUIRED  Post Medication Reconciliation Status:  Discharge medications reconciled, continue medications without change       Allergies     Allergies   Allergen Reactions    Omeprazole Unknown       Review of Systems   A comprehensive review of 14 systems was done.  "Pertinent findings noted here and in history of present illness. All the rest negative.  Constitutional: Negative.  Negative for fever, chills, she has  activity change, appetite change and fatigue.   HENT: Negative for congestion and facial swelling.    Eyes: Negative for photophobia, redness and visual disturbance.   Respiratory: Negative for cough and chest tightness.    Cardiovascular: Negative for chest pain, palpitations and leg swelling.   Gastrointestinal: Negative for nausea, diarrhea, constipation, blood in stool and abdominal distention.   Genitourinary: Negative.    Musculoskeletal: Negative.  Has been weak but denies any dizziness or lightheadedness  Skin: Negative.    Neurological: Negative for dizziness, tremors, syncope, weakness, light-headedness and headaches.   Reports she had an episode when her head moved to the left side she is not sure if it moves spontaneously or she moved it.  Suspect she had another episode similar to the one that landed her in the hospital but could not elaborate any further  Hematological: Does not bruise/bleed easily.   Psychiatric/Behavioral: Negative.        Physical Exam   BP (!) 144/58   Pulse 51   Temp 97.6  F (36.4  C)   Resp 18   Ht 1.676 m (5' 6\")   Wt 53.8 kg (118 lb 9.6 oz)   LMP  (LMP Unknown)   SpO2 95%   BMI 19.14 kg/m       Constitutional: Oriented to person, place, and time and appears well-developed.   HEENT:  Normocephalic and atraumatic.  Eyes: Conjunctivae and EOM are normal. Pupils are equal, round, and reactive to light. No discharge.  No scleral icterus. Nose normal. Mouth/Throat: Oropharynx is clear and moist. No oropharyngeal exudate.    NECK: Normal range of motion. Neck supple. No JVD present. No tracheal deviation present. No thyromegaly present.   CARDIOVASCULAR: Normal rate, regular rhythm and intact distal pulses.  Exam reveals no gallop and no friction rub.  Systolic murmur present.  PULMONARY: Effort normal and breath sounds " normal. No respiratory distress.No Wheezing or rales.  ABDOMEN: Soft. Bowel sounds are normal. No distension and no mass.  There is no tenderness. There is no rebound and no guarding. No HSM.  MUSCULOSKELETAL: Normal range of motion. Mild kyphosis, no tenderness.  LYMPH NODES: Has no cervical, supraclavicular, axillary and groin adenopathy.   NEUROLOGICAL: Alert and oriented to person, place, and time. No cranial nerve deficit.  Normal muscle tone. Coordination normal.   GENITOURINARY: Deferred exam.  SKIN: Skin is warm and dry. No rash noted. No erythema. No pallor.   EXTREMITIES: No cyanosis, no clubbing, no edema. No Deformity.  PSYCHIATRIC: Normal mood, affect and behavior.  Some recall issues related to this last episode noted    Lab Results     Cr 1.1 in hosp        Electronically signed by    Carli Freed MD

## 2023-12-26 NOTE — LETTER
12/26/2023        RE: Kim Valdes  [address] [address]        [facility] GERIATRIC SERVICES       Patient Kim Valdes  MRN: [id number]        Reason for Visit     Chief Complaint   Patient presents with     Hospital F/U       Code Status     CPR/Full code     Assessment/plan     Transient neurological impairment.  With repeat episode reported by the patient today.  Neurology was consulted.  Believe this is most likely seizure.  Extensive workup so far has been negative  Currently on Keppra 500 twice daily.  She does have an event monitor placed at discharge.    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    Bradycardia-  On a low-dose of Toprol to 12.5 daily for due to bradycardia concerns  MED held today due to bradycardia concerns  On DAPT with both Plavix and aspirin for 10 days as recommended by cardiology    Hypertension currently on losartan monitor blood pressures    Ckd-last cr 1.1  monitor    Hyperlipidemia she is currently on atorvastatin    Generalized weakness -has difficulty standing and maintaining her balance.  Currently using a walker  Continue with PT and OT  Daughter updated on the phone including her new episode which suspicious for seizure-like activity.  Daughter unfortunately has not connected her event monitor  Advised that she should do this and update given that we do not have telemetry monitoring in the TCU.  At this point discussion done with the daughter regarding concerns and updates provided will not increase her Keppra but would encourage her to see neurology if this remains an ongoing issue.  Daughter has noted more cognitive impairment stemming from these episodes which are all new  Care plan also reviewed with therapy will update me about any concerns relating to cognition as well as with any reproducible symptoms.  Time spent in this visit was 45 minutes including reviewing concerns with the patient and updating her daughter and reviewing concerns with  therapy including  Her weakness and confusion      History     Patient is a very pleasant 88 year old female who is admitted to TCU  Patient was admitted to the hospital with increased confusion  She recently has an AVNRT which was ablated on 12/13/2023  Her metoprolol was reduced due to bradycardia  Unfortunately had another low heart rate of 51 and nursing elected to hold her beta-blockers  Unfortunately today had another episode where she felt her mom had moved to the right she is not sure of her head moved or she herself moved it  Patient is having some confusion related to that episode  Some weakness reported also    Past Medical & Surgical History     PAST MEDICAL HISTORY:   Past Medical History:   Diagnosis Date     Cholecystitis, unspecified     Created by Conversion       PAST SURGICAL HISTORY:   has a past surgical history that includes Oophorectomy (Left, age 61); Laparoscopic cholecystectomy (mid 60's); and appendectomy (age 13).      Past Social History     Reviewed,  reports that she has never smoked. She has never been exposed to tobacco smoke. She has never used smokeless tobacco.    Family History     Reviewed, and family history includes Cerebrovascular Disease in her mother; Coronary Artery Disease in her father.    Medication List     Current Outpatient Medications   Medication     acetaminophen (TYLENOL) 325 MG tablet     aspirin 81 MG EC tablet     atorvastatin (LIPITOR) 10 MG tablet     clopidogrel (PLAVIX) 75 MG tablet     levETIRAcetam (KEPPRA) 500 MG tablet     losartan (COZAAR) 50 MG tablet     metoprolol succinate ER (TOPROL XL) 25 MG 24 hr tablet     metoprolol succinate ER (TOPROL XL) 50 MG 24 hr tablet     mometasone (ELOCON) 0.1 % ointment     No current facility-administered medications for this visit.      MED REC REQUIRED  Post Medication Reconciliation Status:  Discharge medications reconciled, continue medications without change       Allergies     Allergies   Allergen Reactions  "    Omeprazole Unknown       Review of Systems   A comprehensive review of 14 systems was done. Pertinent findings noted here and in history of present illness. All the rest negative.  Constitutional: Negative.  Negative for fever, chills, she has  activity change, appetite change and fatigue.   HENT: Negative for congestion and facial swelling.    Eyes: Negative for photophobia, redness and visual disturbance.   Respiratory: Negative for cough and chest tightness.    Cardiovascular: Negative for chest pain, palpitations and leg swelling.   Gastrointestinal: Negative for nausea, diarrhea, constipation, blood in stool and abdominal distention.   Genitourinary: Negative.    Musculoskeletal: Negative.  Has been weak but denies any dizziness or lightheadedness  Skin: Negative.    Neurological: Negative for dizziness, tremors, syncope, weakness, light-headedness and headaches.   Reports she had an episode when her head moved to the left side she is not sure if it moves spontaneously or she moved it.  Suspect she had another episode similar to the one that landed her in the hospital but could not elaborate any further  Hematological: Does not bruise/bleed easily.   Psychiatric/Behavioral: Negative.        Physical Exam   BP (!) 144/58   Pulse 51   Temp 97.6  F (36.4  C)   Resp 18   Ht 1.676 m (5' 6\")   Wt 53.8 kg (118 lb 9.6 oz)   LMP  (LMP Unknown)   SpO2 95%   BMI 19.14 kg/m       Constitutional: Oriented to person, place, and time and appears well-developed.   HEENT:  Normocephalic and atraumatic.  Eyes: Conjunctivae and EOM are normal. Pupils are equal, round, and reactive to light. No discharge.  No scleral icterus. Nose normal. Mouth/Throat: Oropharynx is clear and moist. No oropharyngeal exudate.    NECK: Normal range of motion. Neck supple. No JVD present. No tracheal deviation present. No thyromegaly present.   CARDIOVASCULAR: Normal rate, regular rhythm and intact distal pulses.  Exam reveals no gallop " and no friction rub.  Systolic murmur present.  PULMONARY: Effort normal and breath sounds normal. No respiratory distress.No Wheezing or rales.  ABDOMEN: Soft. Bowel sounds are normal. No distension and no mass.  There is no tenderness. There is no rebound and no guarding. No HSM.  MUSCULOSKELETAL: Normal range of motion. Mild kyphosis, no tenderness.  LYMPH NODES: Has no cervical, supraclavicular, axillary and groin adenopathy.   NEUROLOGICAL: Alert and oriented to person, place, and time. No cranial nerve deficit.  Normal muscle tone. Coordination normal.   GENITOURINARY: Deferred exam.  SKIN: Skin is warm and dry. No rash noted. No erythema. No pallor.   EXTREMITIES: No cyanosis, no clubbing, no edema. No Deformity.  PSYCHIATRIC: Normal mood, affect and behavior.  Some recall issues related to this last episode noted    Lab Results     Cr 1.1 in hosp        Electronically signed by    Carli Freed MD                            Sincerely,        NITIN Castaneda

## 2023-12-29 NOTE — TELEPHONE ENCOUNTER
Have not yet received forms.     Dr Way is also out of office until Jan 2nd and covering provider will likely not be able to complete these due to not knowing the patient and the clinic being closed over the holiday weekend.

## 2023-12-29 NOTE — TELEPHONE ENCOUNTER
Left forms to be filled out please fax back when completed [phone] (  and wife, both have  forms)

## 2023-12-29 NOTE — TELEPHONE ENCOUNTER
Marie Day will be dropping off forms that will need to be filled out today (if possible, before the holiday) and faxed ASAP to Dominique Singh [phone]    Please call the patients daughters with any questions:  Julissa Guzman:  [phone]  Teresa Duran:  [phone]    ---    Forms/Letter Request    Type of form/letter: [facility]    Do we have the form/letter: No, not yet.  Marie Day will be dropping them off today.    Where did/will the form come from? Family member will either be bringing it in or uploading it on [employer]    When is form/letter needed by: Patients daughter called and pled with me to communicate the urgency and appreciation and gratitude they would have for getting forms filled out today.    How would you like the form/letter returned: Fax : to Dominique Singh [phone]    Patient Notified form requests are processed in 3-5 business days:Yes    Could we send this information to you in [employer] or would you prefer to receive a phone call?:   Caller would prefer a phone call     Okay to leave a detailed message?: Yes at Cell number on file:    Telephone Information:   Mobile [phone]

## 2023-12-29 NOTE — TELEPHONE ENCOUNTER
Forms received. Spoke with covering provider and she is unable to complete the forms for admission to assisted living today due to never seeing the patient.     Forms prepped for Dr Way to complete when he returns to the office on 1/2/2024 and then they will be faxed back as instructed.

## 2023-12-29 NOTE — TELEPHONE ENCOUNTER
Duplicate encounter. Please see previous encounter about these forms for tracking/completion. Closing this encounter.

## 2023-12-29 NOTE — TELEPHONE ENCOUNTER
12/29/23  Daughter Julissa calling to check status of these forms. Relayed that they must wait until Tuesday when Dr. Way is back in office. Per Julissa, she was hoping to fly her parents out to [place] this weekend. She is asking if a PA can fill these out or anyone can fill them out and send them today to at least get the process started, and then Dr. Way's signature can be sent when back in office on Tuesday. She stated that it could even be a print out with a summary of visits and use a stamp to sign.  Julissa requests a call back if there is anything that can be done today. Writer relayed that message will be marked as high priority for Dr. Way to address first thing upon his return.   Jeaneth

## 2024-01-01 ENCOUNTER — LAB REQUISITION (OUTPATIENT)
Dept: LAB | Facility: CLINIC | Age: 89
End: 2024-01-01
Payer: MEDICARE

## 2024-01-01 ENCOUNTER — DOCUMENTATION ONLY (OUTPATIENT)
Dept: GERIATRICS | Facility: CLINIC | Age: 89
End: 2024-01-01
Payer: MEDICARE

## 2024-01-01 ENCOUNTER — DISCHARGE SUMMARY NURSING HOME (OUTPATIENT)
Dept: GERIATRICS | Facility: CLINIC | Age: 89
End: 2024-01-01
Payer: MEDICARE

## 2024-01-01 ENCOUNTER — TELEPHONE (OUTPATIENT)
Dept: GERIATRICS | Facility: CLINIC | Age: 89
End: 2024-01-01
Payer: MEDICARE

## 2024-01-01 VITALS
HEIGHT: 66 IN | TEMPERATURE: 97.5 F | SYSTOLIC BLOOD PRESSURE: 176 MMHG | HEART RATE: 66 BPM | BODY MASS INDEX: 18.93 KG/M2 | OXYGEN SATURATION: 95 % | RESPIRATION RATE: 20 BRPM | DIASTOLIC BLOOD PRESSURE: 73 MMHG | WEIGHT: 117.8 LBS

## 2024-01-01 DIAGNOSIS — R41.0 CONFUSION: ICD-10-CM

## 2024-01-01 DIAGNOSIS — Z11.1 SCREENING EXAMINATION FOR PULMONARY TUBERCULOSIS: Primary | ICD-10-CM

## 2024-01-01 DIAGNOSIS — R41.0 DISORIENTATION, UNSPECIFIED: ICD-10-CM

## 2024-01-01 DIAGNOSIS — N39.0 URINARY TRACT INFECTION, SITE NOT SPECIFIED: ICD-10-CM

## 2024-01-01 DIAGNOSIS — I10 ESSENTIAL HYPERTENSION: Primary | ICD-10-CM

## 2024-01-01 DIAGNOSIS — R53.81 PHYSICAL DECONDITIONING: ICD-10-CM

## 2024-01-01 LAB
ALBUMIN UR-MCNC: NEGATIVE MG/DL
ANION GAP SERPL CALCULATED.3IONS-SCNC: 10 MMOL/L (ref 7–15)
APPEARANCE UR: CLEAR
BILIRUB UR QL STRIP: NEGATIVE
BUN SERPL-MCNC: 26.9 MG/DL (ref 8–23)
CALCIUM SERPL-MCNC: 9.2 MG/DL (ref 8.8–10.2)
CHLORIDE SERPL-SCNC: 102 MMOL/L (ref 98–107)
COLOR UR AUTO: ABNORMAL
CREAT SERPL-MCNC: 1.01 MG/DL (ref 0.51–0.95)
DEPRECATED HCO3 PLAS-SCNC: 24 MMOL/L (ref 22–29)
EGFRCR SERPLBLD CKD-EPI 2021: 54 ML/MIN/1.73M2
ERYTHROCYTE [DISTWIDTH] IN BLOOD BY AUTOMATED COUNT: 14.6 % (ref 10–15)
GLUCOSE SERPL-MCNC: 103 MG/DL (ref 70–99)
GLUCOSE UR STRIP-MCNC: NEGATIVE MG/DL
HCT VFR BLD AUTO: 37.6 % (ref 35–47)
HGB BLD-MCNC: 12.4 G/DL (ref 11.7–15.7)
HGB UR QL STRIP: ABNORMAL
KETONES UR STRIP-MCNC: NEGATIVE MG/DL
LEUKOCYTE ESTERASE UR QL STRIP: ABNORMAL
MCH RBC QN AUTO: 32.1 PG (ref 26.5–33)
MCHC RBC AUTO-ENTMCNC: 33 G/DL (ref 31.5–36.5)
MCV RBC AUTO: 97 FL (ref 78–100)
MUCOUS THREADS #/AREA URNS LPF: PRESENT /LPF
NITRATE UR QL: NEGATIVE
PH UR STRIP: 5 [PH] (ref 5–7)
PLATELET # BLD AUTO: 166 10E3/UL (ref 150–450)
POTASSIUM SERPL-SCNC: 4.3 MMOL/L (ref 3.4–5.3)
RBC # BLD AUTO: 3.86 10E6/UL (ref 3.8–5.2)
RBC URINE: 1 /HPF
SODIUM SERPL-SCNC: 136 MMOL/L (ref 135–145)
SP GR UR STRIP: 1.01 (ref 1–1.03)
SQUAMOUS EPITHELIAL: <1 /HPF
UROBILINOGEN UR STRIP-MCNC: NORMAL MG/DL
WBC # BLD AUTO: 5.9 10E3/UL (ref 4–11)
WBC URINE: 4 /HPF

## 2024-01-01 PROCEDURE — 80048 BASIC METABOLIC PNL TOTAL CA: CPT | Mod: ORL | Performed by: NURSE PRACTITIONER

## 2024-01-01 PROCEDURE — P9604 ONE-WAY ALLOW PRORATED TRIP: HCPCS | Mod: ORL | Performed by: NURSE PRACTITIONER

## 2024-01-01 PROCEDURE — 81001 URINALYSIS AUTO W/SCOPE: CPT | Performed by: FAMILY MEDICINE

## 2024-01-01 PROCEDURE — 99310 SBSQ NF CARE HIGH MDM 45: CPT | Performed by: NURSE PRACTITIONER

## 2024-01-01 PROCEDURE — 85027 COMPLETE CBC AUTOMATED: CPT | Mod: ORL | Performed by: NURSE PRACTITIONER

## 2024-01-01 PROCEDURE — 36415 COLL VENOUS BLD VENIPUNCTURE: CPT | Performed by: NURSE PRACTITIONER

## 2024-01-01 NOTE — PROGRESS NOTES
[facility] GERIATRIC SERVICES  Chief Complaint   Patient presents with    Hospital F/U     [facility] Medical Record Number:  [id number]  Place of Service where encounter took place:  [facility] ([facility]) [[id number]]  Code Status:  Full Code    HISTORY:      HPI:  Kim Valdes  is 88 year old (1935) undergoing physical, occupational and speech therapy. 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     Hospital Course     Transient neurologic impairment  Initially thought to be TIA however recurrent episodes a/w nausea, apnea, stereotyped movement. Repeat MRI w/o acute process. EEG negative for epileptiform activity, but did show persistent left hemispheric slowing/abnormality. Serial head imaging remained unrevealing. ACS w/u negative (mild stable trop elevation post AVN ablation). Basic labs normal. Family reported some viral symptoms prior to admission, so underwent LP. CSF PCR initially showed GBS, however this was determined to be spurious as presentation inconsistent with bacterial meningitis and no pleocytosis (was briefly on IV antibiotics and ID consulted to confirm). Neurology followed and recommended trial of empiric Keppra 500mg BID. The etiology of the episodes is not clear but seizures not detected by EEG remains a possibility. No episodes during the final 48h of her admission. To complete her 21 days of DAPT (through 1/3), event monitor. Neurology follow up to be arranged.     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 Unlikely that this alone would have caused her neurology symptoms, however, I suspect that she will not tolerate rates that slow long term. Decrease Toprolol to 12.5mg daily. Event monitor in place. Low threshold to discontinue beta blockade. Pt does not have EP f/u until March.    Today she is seen to review vital  signs, labs, routine visit , and a face to face to face for discharge.  She denied chest pain shortness of breath cough congestion constipation or diarrhea.  She was alert and oriented x 3 however she also had episodes of confusion.  She does report hallucinations yesterday and saw people sitting on the side of her bed.  UA was negative and no culture indicated.  BMP and CBC pending.  She was to discharge and had to [place] with family however this has currently been placed on hold.  She will complete Plavix  on 1/4/24  She is with an event monitor which was to end on 1/3/24 however per her daughter it is not due to be taken off yet.  Her daughter has been managing the monitor.  She denied any pain.  Blood pressures reviewed and have been labile with pulses ranging from 48-80.    ALLERGIES:Omeprazole    PAST MEDICAL HISTORY:   Past Medical History:   Diagnosis Date    Cholecystitis, unspecified     Created by Conversion        PAST SURGICAL HISTORY:   has a past surgical history that includes Oophorectomy (Left, age 61); Laparoscopic cholecystectomy (mid 60's); and appendectomy (age 13).    FAMILY HISTORY: family history includes Cerebrovascular Disease in her mother; Coronary Artery Disease in her father.    SOCIAL HISTORY:  reports that she has never smoked. She has never been exposed to tobacco smoke. She has never used smokeless tobacco.    ROS:  Constitutional: Negative for activity change, appetite change, fatigue and fever.   HENT: Negative for congestion.    Respiratory: Negative for cough, shortness of breath and wheezing.    Cardiovascular: Negative for chest pain and leg swelling.   Gastrointestinal: Negative for abdominal distention, abdominal pain, constipation, diarrhea and nausea.   Genitourinary: Negative for dysuria.   Musculoskeletal: Negative for arthralgia. Negative for back pain.   Skin: Negative for color change and wound.   Neurological: Negative for dizziness.   Psychiatric/Behavioral: Negative  "for agitation, behavioral problems and positive confusion and hallucinations     Physical Exam:  Constitutional:       Appearance: Patient is well-developed.   HENT:      Head: Normocephalic.   Eyes:      Conjunctiva/sclera: Conjunctivae normal.   Neck:      Musculoskeletal: Normal range of motion.   Cardiovascular:      Rate and Rhythm: Normal rate and regular rhythm.      Heart sounds: Normal heart sounds. No murmur.   Pulmonary:      Effort: No respiratory distress.      Breath sounds: Normal breath sounds. No wheezing or rales.   Abdominal:      General: Bowel sounds are normal. There is no distension.      Palpations: Abdomen is soft.      Tenderness: There is no abdominal tenderness.   Musculoskeletal:       Normal range of motion.     Skin:General:        Skin is warm.   Neurological:         Mental Status: Patient is alert and oriented to person, place, and time with episodes of confusion and hallucinations    Psychiatric:         Behavior: Behavior normal.     Vitals:BP (!) 176/73   Pulse 66   Temp 97.5  F (36.4  C)   Resp 20   Ht 1.676 m (5' 6\")   Wt 53.4 kg (117 lb 12.8 oz)   LMP  (LMP Unknown)   SpO2 95%   BMI 19.01 kg/m   and Body mass index is 19.01 kg/m .    Lab/Diagnostic data:   Recent Results (from the past 240 hour(s))   UA with Microscopic reflex to Culture    Collection Time: 01/02/24  5:00 PM    Specimen: Urine, Clean Catch   Result Value Ref Range    Color Urine Light Yellow Colorless, Straw, Light Yellow, Yellow    Appearance Urine Clear Clear    Glucose Urine Negative Negative mg/dL    Bilirubin Urine Negative Negative    Ketones Urine Negative Negative mg/dL    Specific Gravity Urine 1.012 1.003 - 1.035    Blood Urine Small (A) Negative    pH Urine 5.0 5.0 - 7.0    Protein Albumin Urine Negative Negative mg/dL    Urobilinogen Urine Normal Normal, 2.0 mg/dL    Nitrite Urine Negative Negative    Leukocyte Esterase Urine Trace (A) Negative    Mucus Urine Present (A) None Seen /LPF    " RBC Urine 1 <=2 /HPF    WBC Urine 4 <=5 /HPF    Squamous Epithelials Urine <1 <=1 /HPF        MEDICATIONS:     Review of your medicines            Accurate as of January 2, 2024 11:59 PM. If you have any questions, ask your nurse or doctor.                CONTINUE these medicines which have NOT CHANGED        Dose / Directions   acetaminophen 325 MG tablet  Commonly known as: TYLENOL      Dose: 650 mg  Take 650 mg by mouth every 4 hours as needed  Refills: 0     aspirin 81 MG EC tablet  Commonly known as: ASA      Dose: 81 mg  [ASPIRIN 81 MG EC TABLET] Take 81 mg by mouth every evening.  Refills: 0     atorvastatin 10 MG tablet  Commonly known as: LIPITOR  Used for: Hyperlipidemia LDL goal <100      Dose: 10 mg  TAKE 1 TABLET BY MOUTH AT BEDTIME  Quantity: 60 tablet  Refills: 0     clopidogrel 75 MG tablet  Commonly known as: PLAVIX      Dose: 75 mg  Take 75 mg by mouth daily  Refills: 0     levETIRAcetam 500 MG tablet  Commonly known as: KEPPRA      Dose: 500 mg  Take 500 mg by mouth 2 times daily  Refills: 0     losartan 50 MG tablet  Commonly known as: COZAAR  Used for: Essential hypertension      Dose: 50 mg  Take 1 tablet (50 mg) by mouth daily  Quantity: 90 tablet  Refills: 3     metoprolol succinate ER 25 MG 24 hr tablet  Commonly known as: TOPROL XL      Dose: 12.5 mg  Take 12.5 mg by mouth daily  Refills: 0              ASSESSMENT/PLAN  Encounter Diagnoses   Name Primary?    Essential hypertension Yes    Physical deconditioning     Confusion      Hypertension continue losartan 50 mg daily, metoprolol succinate 12.5 mg daily    Physical deconditioning PT OT speech therapy    Seizures on Keppra 500 mg twice daily    TIA continue Plavix x 10 days to end on 1/4/2024.  Continue baby aspirin daily    Pain management as needed Tylenol    Hyperlipidemia on atorvastatin    Confusion U/A negative, CBC and BMP pending     Electronically signed by: Vanessa Bradford CNP

## 2024-01-02 NOTE — PROGRESS NOTES
Quantiferon Gold test ordered as tuberculosis screening, as required by the assisted living facility in [place] where she plans to relocate  According to the forms from the facility, tuberculosis screening has to be documented negative prior to admission, per [place] law.

## 2024-01-02 NOTE — LETTER
1/2/2024        RE: Kim Valdes  [address] [address]        [facility] GERIATRIC SERVICES  Chief Complaint   Patient presents with     Hospital F/U     [facility] Record Number:  [id number]  Place of Service where encounter took place:  [facility] ([facility]) [[id number]]  Code Status:  Full Code    HISTORY:      HPI:  Kim Valdes  is 88 year old (1935) undergoing physical, occupational and speech therapy. 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     Hospital Course     Transient neurologic impairment  Initially thought to be TIA however recurrent episodes a/w nausea, apnea, stereotyped movement. Repeat MRI w/o acute process. EEG negative for epileptiform activity, but did show persistent left hemispheric slowing/abnormality. Serial head imaging remained unrevealing. ACS w/u negative (mild stable trop elevation post AVN ablation). Basic labs normal. Family reported some viral symptoms prior to admission, so underwent LP. CSF PCR initially showed GBS, however this was determined to be spurious as presentation inconsistent with bacterial meningitis and no pleocytosis (was briefly on IV antibiotics and ID consulted to confirm). Neurology followed and recommended trial of empiric Keppra 500mg BID. The etiology of the episodes is not clear but seizures not detected by EEG remains a possibility. No episodes during the final 48h of her admission. To complete her 21 days of DAPT (through 1/3), event monitor. Neurology follow up to be arranged.     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 Unlikely that this alone would have caused her neurology symptoms, however, I suspect that she will not tolerate rates that slow long term. Decrease Toprolol to 12.5mg daily. Event monitor in place. Low threshold to discontinue  beta blockade. Pt does not have EP f/u until March.    Today she is seen to review vital signs, labs, routine visit , and a face to face to face for discharge.  She denied chest pain shortness of breath cough congestion constipation or diarrhea.  She was alert and oriented x 3 however she also had episodes of confusion.  She does report hallucinations yesterday and saw people sitting on the side of her bed.  UA was negative and no culture indicated.  BMP and CBC pending.  She was to discharge and had to [place] with family however this has currently been placed on hold.  She will complete Plavix  on 1/4/24  She is with an event monitor which was to end on 1/3/24 however per her daughter it is not due to be taken off yet.  Her daughter has been managing the monitor.  She denied any pain.  Blood pressures reviewed and have been labile with pulses ranging from 48-80.    ALLERGIES:Omeprazole    PAST MEDICAL HISTORY:   Past Medical History:   Diagnosis Date     Cholecystitis, unspecified     Created by Conversion        PAST SURGICAL HISTORY:   has a past surgical history that includes Oophorectomy (Left, age 61); Laparoscopic cholecystectomy (mid 60's); and appendectomy (age 13).    FAMILY HISTORY: family history includes Cerebrovascular Disease in her mother; Coronary Artery Disease in her father.    SOCIAL HISTORY:  reports that she has never smoked. She has never been exposed to tobacco smoke. She has never used smokeless tobacco.    ROS:  Constitutional: Negative for activity change, appetite change, fatigue and fever.   HENT: Negative for congestion.    Respiratory: Negative for cough, shortness of breath and wheezing.    Cardiovascular: Negative for chest pain and leg swelling.   Gastrointestinal: Negative for abdominal distention, abdominal pain, constipation, diarrhea and nausea.   Genitourinary: Negative for dysuria.   Musculoskeletal: Negative for arthralgia. Negative for back pain.   Skin: Negative for color  "change and wound.   Neurological: Negative for dizziness.   Psychiatric/Behavioral: Negative for agitation, behavioral problems and positive confusion and hallucinations     Physical Exam:  Constitutional:       Appearance: Patient is well-developed.   HENT:      Head: Normocephalic.   Eyes:      Conjunctiva/sclera: Conjunctivae normal.   Neck:      Musculoskeletal: Normal range of motion.   Cardiovascular:      Rate and Rhythm: Normal rate and regular rhythm.      Heart sounds: Normal heart sounds. No murmur.   Pulmonary:      Effort: No respiratory distress.      Breath sounds: Normal breath sounds. No wheezing or rales.   Abdominal:      General: Bowel sounds are normal. There is no distension.      Palpations: Abdomen is soft.      Tenderness: There is no abdominal tenderness.   Musculoskeletal:       Normal range of motion.     Skin:General:        Skin is warm.   Neurological:         Mental Status: Patient is alert and oriented to person, place, and time with episodes of confusion and hallucinations    Psychiatric:         Behavior: Behavior normal.     Vitals:BP (!) 176/73   Pulse 66   Temp 97.5  F (36.4  C)   Resp 20   Ht 1.676 m (5' 6\")   Wt 53.4 kg (117 lb 12.8 oz)   LMP  (LMP Unknown)   SpO2 95%   BMI 19.01 kg/m   and Body mass index is 19.01 kg/m .    Lab/Diagnostic data:   Recent Results (from the past 240 hour(s))   UA with Microscopic reflex to Culture    Collection Time: 01/02/24  5:00 PM    Specimen: Urine, Clean Catch   Result Value Ref Range    Color Urine Light Yellow Colorless, Straw, Light Yellow, Yellow    Appearance Urine Clear Clear    Glucose Urine Negative Negative mg/dL    Bilirubin Urine Negative Negative    Ketones Urine Negative Negative mg/dL    Specific Gravity Urine 1.012 1.003 - 1.035    Blood Urine Small (A) Negative    pH Urine 5.0 5.0 - 7.0    Protein Albumin Urine Negative Negative mg/dL    Urobilinogen Urine Normal Normal, 2.0 mg/dL    Nitrite Urine Negative Negative "    Leukocyte Esterase Urine Trace (A) Negative    Mucus Urine Present (A) None Seen /LPF    RBC Urine 1 <=2 /HPF    WBC Urine 4 <=5 /HPF    Squamous Epithelials Urine <1 <=1 /HPF        MEDICATIONS:     Review of your medicines            Accurate as of January 2, 2024 11:59 PM. If you have any questions, ask your nurse or doctor.                CONTINUE these medicines which have NOT CHANGED        Dose / Directions   acetaminophen 325 MG tablet  Commonly known as: TYLENOL      Dose: 650 mg  Take 650 mg by mouth every 4 hours as needed  Refills: 0     aspirin 81 MG EC tablet  Commonly known as: ASA      Dose: 81 mg  [ASPIRIN 81 MG EC TABLET] Take 81 mg by mouth every evening.  Refills: 0     atorvastatin 10 MG tablet  Commonly known as: LIPITOR  Used for: Hyperlipidemia LDL goal <100      Dose: 10 mg  TAKE 1 TABLET BY MOUTH AT BEDTIME  Quantity: 60 tablet  Refills: 0     clopidogrel 75 MG tablet  Commonly known as: PLAVIX      Dose: 75 mg  Take 75 mg by mouth daily  Refills: 0     levETIRAcetam 500 MG tablet  Commonly known as: KEPPRA      Dose: 500 mg  Take 500 mg by mouth 2 times daily  Refills: 0     losartan 50 MG tablet  Commonly known as: COZAAR  Used for: Essential hypertension      Dose: 50 mg  Take 1 tablet (50 mg) by mouth daily  Quantity: 90 tablet  Refills: 3     metoprolol succinate ER 25 MG 24 hr tablet  Commonly known as: TOPROL XL      Dose: 12.5 mg  Take 12.5 mg by mouth daily  Refills: 0              ASSESSMENT/PLAN  Encounter Diagnoses   Name Primary?     Essential hypertension Yes     Physical deconditioning      Confusion      Hypertension continue losartan 50 mg daily, metoprolol succinate 12.5 mg daily    Physical deconditioning PT OT speech therapy    Seizures on Keppra 500 mg twice daily    TIA continue Plavix x 10 days to end on 1/4/2024.  Continue baby aspirin daily    Pain management as needed Tylenol    Hyperlipidemia on atorvastatin    Confusion U/A negative, CBC and BMP pending      Electronically signed by: Vanessa Bradford CNP       Sincerely,        Vanessa Bradford, CNP

## 2024-01-03 NOTE — TELEPHONE ENCOUNTER
[facility] Geriatrics Triage Nurse Telephone Encounter    Provider: JANELLE Kenny  Facility: [facility]  Facility Type:  TCU    Caller: Malena  Call Back Number: [phone]    Allergies:    Allergies   Allergen Reactions    Omeprazole Unknown        Reason for call: Nurse is calling to report that patient has worsening confusion.  Patient has a very delayed response to questions and a distant look on her face.  She's also noted to have increased difficulty swallowing and hallucinations.  VS:  T=97.2, P=69, R=18, CD=417/37, O2=97%RA.  Patient was prescribed a 10 day course of Plavix and the last dose was given this morning.  Recent UA was negative.      Verbal Order/Direction given by Provider: Send patient to the ER due to worsening confusion, altered mental status, hallucinations.      Provider giving Order:  JANELLE Kenny    Verbal Order given to: Malena Rivera RN